# Patient Record
Sex: FEMALE | Race: WHITE | Employment: UNEMPLOYED | URBAN - METROPOLITAN AREA
[De-identification: names, ages, dates, MRNs, and addresses within clinical notes are randomized per-mention and may not be internally consistent; named-entity substitution may affect disease eponyms.]

---

## 2017-05-13 ENCOUNTER — HOSPITAL ENCOUNTER (OUTPATIENT)
Age: 52
Discharge: HOME | End: 2017-05-13
Payer: MEDICAID

## 2017-05-13 LAB
ABSOLUTE EOS #: 0.1 K/UL (ref 0–0.4)
ABSOLUTE LYMPH #: 2.8 K/UL (ref 1–4.8)
ABSOLUTE MONO #: 0.6 K/UL (ref 0.1–1.3)
ALBUMIN SERPL-MCNC: 4 G/DL (ref 3.5–5.2)
ALBUMIN/GLOBULIN RATIO: ABNORMAL (ref 1–2.5)
ALP BLD-CCNC: 44 U/L (ref 35–104)
ALT SERPL-CCNC: 14 U/L (ref 5–33)
ANION GAP SERPL CALCULATED.3IONS-SCNC: 13 MMOL/L (ref 9–17)
AST SERPL-CCNC: 16 U/L
BASOPHILS # BLD: 1 %
BASOPHILS ABSOLUTE: 0.1 K/UL (ref 0–0.2)
BILIRUB SERPL-MCNC: 0.87 MG/DL (ref 0.3–1.2)
BUN BLDV-MCNC: 11 MG/DL (ref 6–20)
BUN/CREAT BLD: ABNORMAL (ref 9–20)
CALCIUM SERPL-MCNC: 9.9 MG/DL (ref 8.6–10.4)
CHLORIDE BLD-SCNC: 94 MMOL/L (ref 98–107)
CHOLESTEROL/HDL RATIO: 4.5
CHOLESTEROL: 263 MG/DL
CO2: 29 MMOL/L (ref 20–31)
CREAT SERPL-MCNC: 0.65 MG/DL (ref 0.5–0.9)
DIFFERENTIAL TYPE: ABNORMAL
EOSINOPHILS RELATIVE PERCENT: 2 %
GFR AFRICAN AMERICAN: >60 ML/MIN
GFR NON-AFRICAN AMERICAN: >60 ML/MIN
GFR SERPL CREATININE-BSD FRML MDRD: ABNORMAL ML/MIN/{1.73_M2}
GFR SERPL CREATININE-BSD FRML MDRD: ABNORMAL ML/MIN/{1.73_M2}
GLUCOSE BLD-MCNC: 104 MG/DL (ref 70–99)
HCT VFR BLD CALC: 46.5 % (ref 36–46)
HDLC SERPL-MCNC: 58 MG/DL
HEMOGLOBIN: 15.4 G/DL (ref 12–16)
LDL CHOLESTEROL: 173 MG/DL (ref 0–130)
LYMPHOCYTES # BLD: 35 %
MCH RBC QN AUTO: 29.2 PG (ref 26–34)
MCHC RBC AUTO-ENTMCNC: 33 G/DL (ref 31–37)
MCV RBC AUTO: 88.5 FL (ref 80–100)
MONOCYTES # BLD: 8 %
PDW BLD-RTO: 13.9 % (ref 11.5–14.9)
PLATELET # BLD: 301 K/UL (ref 150–450)
PLATELET ESTIMATE: ABNORMAL
PMV BLD AUTO: 8.6 FL (ref 6–12)
POTASSIUM SERPL-SCNC: 4.6 MMOL/L (ref 3.7–5.3)
RBC # BLD: 5.26 M/UL (ref 4–5.2)
RBC # BLD: ABNORMAL 10*6/UL
SEG NEUTROPHILS: 54 %
SEGMENTED NEUTROPHILS ABSOLUTE COUNT: 4.5 K/UL (ref 1.3–9.1)
SODIUM BLD-SCNC: 136 MMOL/L (ref 135–144)
TOTAL PROTEIN: 7.6 G/DL (ref 6.4–8.3)
TRIGL SERPL-MCNC: 159 MG/DL
TSH SERPL DL<=0.05 MIU/L-ACNC: 0.94 MIU/L (ref 0.3–5)
VLDLC SERPL CALC-MCNC: ABNORMAL MG/DL (ref 1–30)
WBC # BLD: 8.1 K/UL (ref 3.5–11)
WBC # BLD: ABNORMAL 10*3/UL

## 2017-05-13 PROCEDURE — 36415 COLL VENOUS BLD VENIPUNCTURE: CPT

## 2017-05-13 PROCEDURE — 80061 LIPID PANEL: CPT

## 2017-05-13 PROCEDURE — 84443 ASSAY THYROID STIM HORMONE: CPT

## 2017-05-13 PROCEDURE — 85025 COMPLETE CBC W/AUTO DIFF WBC: CPT

## 2017-05-13 PROCEDURE — 80053 COMPREHEN METABOLIC PANEL: CPT

## 2017-08-18 ENCOUNTER — HOSPITAL ENCOUNTER (OUTPATIENT)
Dept: WOMENS IMAGING | Age: 52
Discharge: HOME | End: 2017-08-18
Payer: MEDICAID

## 2017-08-18 DIAGNOSIS — Z12.31 ENCOUNTER FOR SCREENING MAMMOGRAM FOR BREAST CANCER: ICD-10-CM

## 2017-08-18 PROCEDURE — 77063 BREAST TOMOSYNTHESIS BI: CPT

## 2017-09-11 ENCOUNTER — HOSPITAL ENCOUNTER (OUTPATIENT)
Dept: WOMENS IMAGING | Age: 52
Discharge: HOME | End: 2017-09-11
Payer: MEDICAID

## 2017-09-11 DIAGNOSIS — N64.89 BREAST ASYMMETRY: ICD-10-CM

## 2017-09-11 DIAGNOSIS — R92.8 ABNORMAL MAMMOGRAM: ICD-10-CM

## 2017-09-11 PROCEDURE — 76642 ULTRASOUND BREAST LIMITED: CPT

## 2017-09-11 PROCEDURE — G0279 TOMOSYNTHESIS, MAMMO: HCPCS

## 2018-03-09 ENCOUNTER — HOSPITAL ENCOUNTER (EMERGENCY)
Age: 53
Discharge: HOME | End: 2018-03-09
Attending: EMERGENCY MEDICINE
Payer: MEDICAID

## 2018-03-09 ENCOUNTER — APPOINTMENT (OUTPATIENT)
Dept: CT IMAGING | Age: 53
End: 2018-03-09
Payer: MEDICAID

## 2018-03-09 VITALS
RESPIRATION RATE: 16 BRPM | WEIGHT: 160 LBS | SYSTOLIC BLOOD PRESSURE: 112 MMHG | BODY MASS INDEX: 32.25 KG/M2 | HEIGHT: 59 IN | OXYGEN SATURATION: 98 % | DIASTOLIC BLOOD PRESSURE: 78 MMHG | TEMPERATURE: 98.1 F | HEART RATE: 95 BPM

## 2018-03-09 DIAGNOSIS — T14.8XXA MUSCLE STRAIN: Primary | ICD-10-CM

## 2018-03-09 LAB
-: ABNORMAL
ABSOLUTE EOS #: 0.1 K/UL (ref 0–0.4)
ABSOLUTE IMMATURE GRANULOCYTE: ABNORMAL K/UL (ref 0–0.3)
ABSOLUTE LYMPH #: 3.6 K/UL (ref 1–4.8)
ABSOLUTE MONO #: 0.9 K/UL (ref 0.1–1.3)
ALBUMIN SERPL-MCNC: 4.2 G/DL (ref 3.5–5.2)
ALBUMIN/GLOBULIN RATIO: ABNORMAL (ref 1–2.5)
ALP BLD-CCNC: 38 U/L (ref 35–104)
ALT SERPL-CCNC: 13 U/L (ref 5–33)
AMORPHOUS: ABNORMAL
ANION GAP SERPL CALCULATED.3IONS-SCNC: 11 MMOL/L (ref 9–17)
AST SERPL-CCNC: 15 U/L
BACTERIA: ABNORMAL
BASOPHILS # BLD: 1 % (ref 0–2)
BASOPHILS ABSOLUTE: 0.1 K/UL (ref 0–0.2)
BILIRUB SERPL-MCNC: 0.46 MG/DL (ref 0.3–1.2)
BILIRUBIN URINE: NEGATIVE
BUN BLDV-MCNC: 7 MG/DL (ref 6–20)
BUN/CREAT BLD: ABNORMAL (ref 9–20)
CALCIUM SERPL-MCNC: 9.8 MG/DL (ref 8.6–10.4)
CASTS UA: ABNORMAL /LPF
CHLORIDE BLD-SCNC: 89 MMOL/L (ref 98–107)
CO2: 29 MMOL/L (ref 20–31)
COLOR: YELLOW
COMMENT UA: ABNORMAL
CREAT SERPL-MCNC: 0.61 MG/DL (ref 0.5–0.9)
CRYSTALS, UA: ABNORMAL /HPF
DIFFERENTIAL TYPE: ABNORMAL
EOSINOPHILS RELATIVE PERCENT: 1 % (ref 0–4)
EPITHELIAL CELLS UA: ABNORMAL /HPF
GFR AFRICAN AMERICAN: >60 ML/MIN
GFR NON-AFRICAN AMERICAN: >60 ML/MIN
GFR SERPL CREATININE-BSD FRML MDRD: ABNORMAL ML/MIN/{1.73_M2}
GFR SERPL CREATININE-BSD FRML MDRD: ABNORMAL ML/MIN/{1.73_M2}
GLUCOSE BLD-MCNC: 88 MG/DL (ref 70–99)
GLUCOSE URINE: NEGATIVE
HCT VFR BLD CALC: 46.4 % (ref 36–46)
HEMOGLOBIN: 15.5 G/DL (ref 12–16)
IMMATURE GRANULOCYTES: ABNORMAL %
KETONES, URINE: NEGATIVE
LEUKOCYTE ESTERASE, URINE: NEGATIVE
LIPASE: 94 U/L (ref 13–60)
LYMPHOCYTES # BLD: 34 % (ref 24–44)
MCH RBC QN AUTO: 30.2 PG (ref 26–34)
MCHC RBC AUTO-ENTMCNC: 33.5 G/DL (ref 31–37)
MCV RBC AUTO: 90.2 FL (ref 80–100)
MONOCYTES # BLD: 8 % (ref 1–7)
MUCUS: ABNORMAL
NITRITE, URINE: NEGATIVE
NRBC AUTOMATED: ABNORMAL PER 100 WBC
OTHER OBSERVATIONS UA: ABNORMAL
PDW BLD-RTO: 13.6 % (ref 11.5–14.9)
PH UA: 6.5 (ref 5–8)
PLATELET # BLD: 344 K/UL (ref 150–450)
PLATELET ESTIMATE: ABNORMAL
PMV BLD AUTO: 8.4 FL (ref 6–12)
POTASSIUM SERPL-SCNC: 4.4 MMOL/L (ref 3.7–5.3)
PROTEIN UA: NEGATIVE
RBC # BLD: 5.14 M/UL (ref 4–5.2)
RBC # BLD: ABNORMAL 10*6/UL
RBC UA: ABNORMAL /HPF
RENAL EPITHELIAL, UA: ABNORMAL /HPF
SEG NEUTROPHILS: 56 % (ref 36–66)
SEGMENTED NEUTROPHILS ABSOLUTE COUNT: 5.9 K/UL (ref 1.3–9.1)
SODIUM BLD-SCNC: 129 MMOL/L (ref 135–144)
SPECIFIC GRAVITY UA: 1.01 (ref 1–1.03)
TOTAL PROTEIN: 7.4 G/DL (ref 6.4–8.3)
TRICHOMONAS: ABNORMAL
TURBIDITY: CLEAR
URINE HGB: ABNORMAL
UROBILINOGEN, URINE: NORMAL
WBC # BLD: 10.6 K/UL (ref 3.5–11)
WBC # BLD: ABNORMAL 10*3/UL
WBC UA: ABNORMAL /HPF
YEAST: ABNORMAL

## 2018-03-09 PROCEDURE — 81001 URINALYSIS AUTO W/SCOPE: CPT

## 2018-03-09 PROCEDURE — 83690 ASSAY OF LIPASE: CPT

## 2018-03-09 PROCEDURE — 99284 EMERGENCY DEPT VISIT MOD MDM: CPT

## 2018-03-09 PROCEDURE — 96374 THER/PROPH/DIAG INJ IV PUSH: CPT

## 2018-03-09 PROCEDURE — 74176 CT ABD & PELVIS W/O CONTRAST: CPT

## 2018-03-09 PROCEDURE — 36415 COLL VENOUS BLD VENIPUNCTURE: CPT

## 2018-03-09 PROCEDURE — 80053 COMPREHEN METABOLIC PANEL: CPT

## 2018-03-09 PROCEDURE — 6360000002 HC RX W HCPCS: Performed by: PHYSICIAN ASSISTANT

## 2018-03-09 PROCEDURE — 85025 COMPLETE CBC W/AUTO DIFF WBC: CPT

## 2018-03-09 RX ORDER — KETOROLAC TROMETHAMINE 30 MG/ML
15 INJECTION, SOLUTION INTRAMUSCULAR; INTRAVENOUS ONCE
Status: COMPLETED | OUTPATIENT
Start: 2018-03-09 | End: 2018-03-09

## 2018-03-09 RX ORDER — NAPROXEN 500 MG/1
500 TABLET ORAL 2 TIMES DAILY
Qty: 30 TABLET | Refills: 0 | Status: SHIPPED | OUTPATIENT
Start: 2018-03-09 | End: 2019-10-28

## 2018-03-09 RX ORDER — CYCLOBENZAPRINE HCL 10 MG
10 TABLET ORAL 2 TIMES DAILY PRN
Qty: 15 TABLET | Refills: 0 | Status: SHIPPED | OUTPATIENT
Start: 2018-03-09 | End: 2018-03-19

## 2018-03-09 RX ADMIN — KETOROLAC TROMETHAMINE 15 MG: 30 INJECTION, SOLUTION INTRAMUSCULAR; INTRAVENOUS at 09:44

## 2018-03-09 ASSESSMENT — PAIN DESCRIPTION - ORIENTATION: ORIENTATION: RIGHT

## 2018-03-09 ASSESSMENT — PAIN SCALES - GENERAL
PAINLEVEL_OUTOF10: 8
PAINLEVEL_OUTOF10: 5

## 2018-03-09 ASSESSMENT — PAIN DESCRIPTION - LOCATION: LOCATION: LEG;BACK

## 2018-03-09 NOTE — ED PROVIDER NOTES
[address]  eMERGENCY dEPARTMENT eNCOUnter      Pt Name: Candido Shin  MRN: [id number]  [place] 1965  Date of evaluation: 3/9/2018  Provider: PAUL Hennessy    CHIEF COMPLAINT       Chief Complaint   Patient presents with    Abdominal Pain           HISTORY OF PRESENT ILLNESS  (Location/Symptom, Timing/Onset, Context/Setting, Quality, Duration, Modifying Factors, Severity.)   Candido Shin is a 47 y.o. female who presents to the emergency department With her daughter with the complaints of right-sided flank pain. She states that this started about 3 days ago. She denies any injuries or falls. She denies any numbness or tingling, states that it slightly on her right hip and slightly above it. She denies any nausea or vomiting, denies any fevers or chills. Denies any dysuria or hematuria. She denies any chest pain shortness of breath abdominal pain nausea or vomiting. Denies any history of kidney stones. She states that it's aggravated by movement and sitting too long and she has to change positions. Nursing Notes were reviewed. REVIEW OF SYSTEMS    (2-9 systems for level 4, 10 or more for level 5)     Review of Systems   Right-sided flank/hip pain    Except as noted above the remainder of the review of systems was reviewed and negative. PAST MEDICAL HISTORY     Past Medical History:   Diagnosis Date    COPD (chronic obstructive pulmonary disease) ([facility] [address]     Hypertension      None otherwise stated in nurses notes    SURGICAL HISTORY       Past Surgical History:   Procedure Laterality Date     SECTION       None otherwise stated in nurses notes    CURRENT MEDICATIONS       Previous Medications    ALBUTEROL (PROVENTIL) (2.5 MG/3ML) 0.083% NEBULIZER SOLUTION    Take 2.5 mg by nebulization every 6 hours as needed for Wheezing. CLONIDINE (CATAPRES) 0.3 MG TABLET    Take 1 tablet by mouth 3 times daily.     DILTIAZEM (CARDIZEM) 60 MG TABLET    Take 1 tablet by mouth 4 times

## 2019-05-15 ENCOUNTER — HOSPITAL ENCOUNTER (OUTPATIENT)
Age: 54
Discharge: HOME | End: 2019-05-15
Payer: COMMERCIAL

## 2019-05-15 LAB
ALBUMIN SERPL-MCNC: 4.3 G/DL (ref 3.5–5.2)
ALBUMIN/GLOBULIN RATIO: ABNORMAL (ref 1–2.5)
ALP BLD-CCNC: 35 U/L (ref 35–104)
ALT SERPL-CCNC: 11 U/L (ref 5–33)
ANION GAP SERPL CALCULATED.3IONS-SCNC: 12 MMOL/L (ref 9–17)
AST SERPL-CCNC: 13 U/L
BILIRUB SERPL-MCNC: 0.35 MG/DL (ref 0.3–1.2)
BUN BLDV-MCNC: 6 MG/DL (ref 6–20)
BUN/CREAT BLD: ABNORMAL (ref 9–20)
CALCIUM SERPL-MCNC: 10 MG/DL (ref 8.6–10.4)
CHLORIDE BLD-SCNC: 91 MMOL/L (ref 98–107)
CO2: 29 MMOL/L (ref 20–31)
CREAT SERPL-MCNC: 0.65 MG/DL (ref 0.5–0.9)
GFR AFRICAN AMERICAN: >60 ML/MIN
GFR NON-AFRICAN AMERICAN: >60 ML/MIN
GFR SERPL CREATININE-BSD FRML MDRD: ABNORMAL ML/MIN/{1.73_M2}
GFR SERPL CREATININE-BSD FRML MDRD: ABNORMAL ML/MIN/{1.73_M2}
GLUCOSE BLD-MCNC: 97 MG/DL (ref 70–99)
POTASSIUM SERPL-SCNC: 4.1 MMOL/L (ref 3.7–5.3)
SODIUM BLD-SCNC: 132 MMOL/L (ref 135–144)
TOTAL PROTEIN: 7.6 G/DL (ref 6.4–8.3)

## 2019-05-15 PROCEDURE — 80053 COMPREHEN METABOLIC PANEL: CPT

## 2019-05-15 PROCEDURE — 36415 COLL VENOUS BLD VENIPUNCTURE: CPT

## 2019-06-05 ENCOUNTER — HOSPITAL ENCOUNTER (OUTPATIENT)
Age: 54
Setting detail: SPECIMEN
Discharge: HOME | End: 2019-06-05
Payer: COMMERCIAL

## 2019-06-07 LAB
HPV SAMPLE: NORMAL
HPV, GENOTYPE 16: NOT DETECTED
HPV, GENOTYPE 18: NOT DETECTED
HPV, HIGH RISK OTHER: NOT DETECTED
HPV, INTERPRETATION: NORMAL
SPECIMEN DESCRIPTION: NORMAL

## 2019-06-10 LAB — CYTOLOGY REPORT: NORMAL

## 2019-10-28 ENCOUNTER — APPOINTMENT (OUTPATIENT)
Dept: CT IMAGING | Age: 54
DRG: 139 | End: 2019-10-28
Payer: COMMERCIAL

## 2019-10-28 ENCOUNTER — APPOINTMENT (OUTPATIENT)
Dept: GENERAL RADIOLOGY | Age: 54
DRG: 139 | End: 2019-10-28
Payer: COMMERCIAL

## 2019-10-28 ENCOUNTER — HOSPITAL ENCOUNTER (INPATIENT)
Age: 54
LOS: 4 days | Discharge: HOME HEALTH | DRG: 139 | End: 2019-11-01
Attending: EMERGENCY MEDICINE | Admitting: INTERNAL MEDICINE
Payer: COMMERCIAL

## 2019-10-28 DIAGNOSIS — R09.02 HYPOXIA: ICD-10-CM

## 2019-10-28 DIAGNOSIS — J44.1 COPD EXACERBATION (HCC): Primary | ICD-10-CM

## 2019-10-28 DIAGNOSIS — R09.02 CHRONIC OBSTRUCTIVE PULMONARY DISEASE WITH HYPOXIA (HCC): ICD-10-CM

## 2019-10-28 DIAGNOSIS — J18.9 PNEUMONIA DUE TO ORGANISM: ICD-10-CM

## 2019-10-28 DIAGNOSIS — J44.9 CHRONIC OBSTRUCTIVE PULMONARY DISEASE WITH HYPOXIA (HCC): ICD-10-CM

## 2019-10-28 LAB
ABSOLUTE BANDS #: 0.86 K/UL (ref 0–1)
ABSOLUTE EOS #: 0 K/UL (ref 0–0.4)
ABSOLUTE IMMATURE GRANULOCYTE: ABNORMAL K/UL (ref 0–0.3)
ABSOLUTE LYMPH #: 2.8 K/UL (ref 1–4.8)
ABSOLUTE MONO #: 1.51 K/UL (ref 0.1–1.3)
ALBUMIN SERPL-MCNC: 3.7 G/DL (ref 3.5–5.2)
ALBUMIN/GLOBULIN RATIO: ABNORMAL (ref 1–2.5)
ALLEN TEST: ABNORMAL
ALP BLD-CCNC: 77 U/L (ref 35–104)
ALT SERPL-CCNC: 16 U/L (ref 5–33)
ANION GAP SERPL CALCULATED.3IONS-SCNC: 17 MMOL/L (ref 9–17)
AST SERPL-CCNC: 15 U/L
BANDS: 4 % (ref 0–10)
BASOPHILS # BLD: 0 % (ref 0–2)
BASOPHILS ABSOLUTE: 0 K/UL (ref 0–0.2)
BILIRUB SERPL-MCNC: 0.49 MG/DL (ref 0.3–1.2)
BUN BLDV-MCNC: 8 MG/DL (ref 6–20)
BUN/CREAT BLD: ABNORMAL (ref 9–20)
CALCIUM SERPL-MCNC: 9.7 MG/DL (ref 8.6–10.4)
CARBOXYHEMOGLOBIN: 2.2 % (ref 0–5)
CHLORIDE BLD-SCNC: 77 MMOL/L (ref 98–107)
CO2: 32 MMOL/L (ref 20–31)
CREAT SERPL-MCNC: 0.55 MG/DL (ref 0.5–0.9)
DIFFERENTIAL TYPE: ABNORMAL
DIRECT EXAM: ABNORMAL
DIRECT EXAM: NORMAL
DIRECT EXAM: NORMAL
EOSINOPHILS RELATIVE PERCENT: 0 % (ref 0–4)
FIO2: 36
GFR AFRICAN AMERICAN: >60 ML/MIN
GFR NON-AFRICAN AMERICAN: >60 ML/MIN
GFR SERPL CREATININE-BSD FRML MDRD: ABNORMAL ML/MIN/{1.73_M2}
GFR SERPL CREATININE-BSD FRML MDRD: ABNORMAL ML/MIN/{1.73_M2}
GLUCOSE BLD-MCNC: 128 MG/DL (ref 70–99)
HCO3 ARTERIAL: 35.4 MMOL/L (ref 22–26)
HCT VFR BLD CALC: 41.4 % (ref 36–46)
HEMOGLOBIN: 13.8 G/DL (ref 12–16)
IMMATURE GRANULOCYTES: ABNORMAL %
LACTIC ACID, SEPSIS WHOLE BLOOD: NORMAL MMOL/L (ref 0.5–1.9)
LACTIC ACID, SEPSIS: 1.2 MMOL/L (ref 0.5–1.9)
LYMPHOCYTES # BLD: 13 % (ref 24–44)
Lab: ABNORMAL
Lab: NORMAL
Lab: NORMAL
MCH RBC QN AUTO: 29.9 PG (ref 26–34)
MCHC RBC AUTO-ENTMCNC: 33.3 G/DL (ref 31–37)
MCV RBC AUTO: 89.8 FL (ref 80–100)
METHEMOGLOBIN: 0.4 % (ref 0–1.9)
MODE: ABNORMAL
MONOCYTES # BLD: 7 % (ref 1–7)
MORPHOLOGY: NORMAL
NEGATIVE BASE EXCESS, ART: ABNORMAL MMOL/L (ref 0–2)
NOTIFICATION TIME: ABNORMAL
NOTIFICATION: ABNORMAL
NRBC AUTOMATED: ABNORMAL PER 100 WBC
O2 DEVICE/FLOW/%: ABNORMAL
O2 SAT, ARTERIAL: 90 % (ref 95–98)
OXYHEMOGLOBIN: ABNORMAL % (ref 95–98)
PATIENT TEMP: 37
PCO2 ARTERIAL: 46.9 MMHG (ref 35–45)
PCO2, ART, TEMP ADJ: ABNORMAL (ref 35–45)
PDW BLD-RTO: 14 % (ref 11.5–14.9)
PEEP/CPAP: ABNORMAL
PH ARTERIAL: 7.49 (ref 7.35–7.45)
PH, ART, TEMP ADJ: ABNORMAL (ref 7.35–7.45)
PLATELET # BLD: 478 K/UL (ref 150–450)
PLATELET ESTIMATE: ABNORMAL
PMV BLD AUTO: 7.8 FL (ref 6–12)
PO2 ARTERIAL: 59.8 MMHG (ref 80–100)
PO2, ART, TEMP ADJ: ABNORMAL MMHG (ref 80–100)
POSITIVE BASE EXCESS, ART: 12.1 MMOL/L (ref 0–2)
POTASSIUM SERPL-SCNC: 3 MMOL/L (ref 3.7–5.3)
PROCALCITONIN: 4.53 NG/ML
PSV: ABNORMAL
PT. POSITION: ABNORMAL
RBC # BLD: 4.61 M/UL (ref 4–5.2)
RBC # BLD: ABNORMAL 10*6/UL
RESPIRATORY RATE: 18
SAMPLE SITE: ABNORMAL
SEG NEUTROPHILS: 76 % (ref 36–66)
SEGMENTED NEUTROPHILS ABSOLUTE COUNT: 16.33 K/UL (ref 1.3–9.1)
SET RATE: ABNORMAL
SODIUM BLD-SCNC: 126 MMOL/L (ref 135–144)
SPECIMEN DESCRIPTION: ABNORMAL
SPECIMEN DESCRIPTION: NORMAL
SPECIMEN DESCRIPTION: NORMAL
TEXT FOR RESPIRATORY: ABNORMAL
TOTAL HB: ABNORMAL G/DL (ref 12–16)
TOTAL PROTEIN: 7.9 G/DL (ref 6.4–8.3)
TOTAL RATE: ABNORMAL
TROPONIN INTERP: NORMAL
TROPONIN INTERP: NORMAL
TROPONIN T: NORMAL NG/ML
TROPONIN T: NORMAL NG/ML
TROPONIN, HIGH SENSITIVITY: 10 NG/L (ref 0–14)
TROPONIN, HIGH SENSITIVITY: 11 NG/L (ref 0–14)
VT: ABNORMAL
WBC # BLD: 21.5 K/UL (ref 3.5–11)
WBC # BLD: ABNORMAL 10*3/UL

## 2019-10-28 PROCEDURE — 94640 AIRWAY INHALATION TREATMENT: CPT

## 2019-10-28 PROCEDURE — 6370000000 HC RX 637 (ALT 250 FOR IP): Performed by: STUDENT IN AN ORGANIZED HEALTH CARE EDUCATION/TRAINING PROGRAM

## 2019-10-28 PROCEDURE — 87804 INFLUENZA ASSAY W/OPTIC: CPT

## 2019-10-28 PROCEDURE — 6370000000 HC RX 637 (ALT 250 FOR IP): Performed by: EMERGENCY MEDICINE

## 2019-10-28 PROCEDURE — 87449 NOS EACH ORGANISM AG IA: CPT

## 2019-10-28 PROCEDURE — 83605 ASSAY OF LACTIC ACID: CPT

## 2019-10-28 PROCEDURE — 6360000004 HC RX CONTRAST MEDICATION: Performed by: EMERGENCY MEDICINE

## 2019-10-28 PROCEDURE — 87070 CULTURE OTHR SPECIMN AEROBIC: CPT

## 2019-10-28 PROCEDURE — 71045 X-RAY EXAM CHEST 1 VIEW: CPT

## 2019-10-28 PROCEDURE — 85025 COMPLETE CBC W/AUTO DIFF WBC: CPT

## 2019-10-28 PROCEDURE — 99285 EMERGENCY DEPT VISIT HI MDM: CPT

## 2019-10-28 PROCEDURE — 2580000003 HC RX 258: Performed by: EMERGENCY MEDICINE

## 2019-10-28 PROCEDURE — 82805 BLOOD GASES W/O2 SATURATION: CPT

## 2019-10-28 PROCEDURE — 80053 COMPREHEN METABOLIC PANEL: CPT

## 2019-10-28 PROCEDURE — 87205 SMEAR GRAM STAIN: CPT

## 2019-10-28 PROCEDURE — 96374 THER/PROPH/DIAG INJ IV PUSH: CPT

## 2019-10-28 PROCEDURE — 36415 COLL VENOUS BLD VENIPUNCTURE: CPT

## 2019-10-28 PROCEDURE — 6360000002 HC RX W HCPCS: Performed by: STUDENT IN AN ORGANIZED HEALTH CARE EDUCATION/TRAINING PROGRAM

## 2019-10-28 PROCEDURE — 87899 AGENT NOS ASSAY W/OPTIC: CPT

## 2019-10-28 PROCEDURE — 84484 ASSAY OF TROPONIN QUANT: CPT

## 2019-10-28 PROCEDURE — 87040 BLOOD CULTURE FOR BACTERIA: CPT

## 2019-10-28 PROCEDURE — 84145 PROCALCITONIN (PCT): CPT

## 2019-10-28 PROCEDURE — 99223 1ST HOSP IP/OBS HIGH 75: CPT | Performed by: INTERNAL MEDICINE

## 2019-10-28 PROCEDURE — 6360000002 HC RX W HCPCS: Performed by: EMERGENCY MEDICINE

## 2019-10-28 PROCEDURE — 36600 WITHDRAWAL OF ARTERIAL BLOOD: CPT

## 2019-10-28 PROCEDURE — 94761 N-INVAS EAR/PLS OXIMETRY MLT: CPT

## 2019-10-28 PROCEDURE — 2060000000 HC ICU INTERMEDIATE R&B

## 2019-10-28 PROCEDURE — 2580000003 HC RX 258: Performed by: STUDENT IN AN ORGANIZED HEALTH CARE EDUCATION/TRAINING PROGRAM

## 2019-10-28 PROCEDURE — 6370000000 HC RX 637 (ALT 250 FOR IP): Performed by: INTERNAL MEDICINE

## 2019-10-28 PROCEDURE — 93005 ELECTROCARDIOGRAM TRACING: CPT | Performed by: EMERGENCY MEDICINE

## 2019-10-28 PROCEDURE — 71260 CT THORAX DX C+: CPT

## 2019-10-28 RX ORDER — MULTIVITAMIN WITH FOLIC ACID 400 MCG
1 TABLET ORAL DAILY
COMMUNITY

## 2019-10-28 RX ORDER — FAMOTIDINE 20 MG/1
20 TABLET, FILM COATED ORAL 2 TIMES DAILY
Status: DISCONTINUED | OUTPATIENT
Start: 2019-10-28 | End: 2019-11-01 | Stop reason: HOSPADM

## 2019-10-28 RX ORDER — ALBUTEROL SULFATE 2.5 MG/3ML
2.5 SOLUTION RESPIRATORY (INHALATION) EVERY 6 HOURS PRN
Status: DISCONTINUED | OUTPATIENT
Start: 2019-10-28 | End: 2019-11-01 | Stop reason: HOSPADM

## 2019-10-28 RX ORDER — METHYLPREDNISOLONE SODIUM SUCCINATE 40 MG/ML
40 INJECTION, POWDER, LYOPHILIZED, FOR SOLUTION INTRAMUSCULAR; INTRAVENOUS EVERY 6 HOURS
Status: COMPLETED | OUTPATIENT
Start: 2019-10-28 | End: 2019-10-30

## 2019-10-28 RX ORDER — LEVOFLOXACIN 5 MG/ML
750 INJECTION, SOLUTION INTRAVENOUS EVERY 24 HOURS
Status: DISCONTINUED | OUTPATIENT
Start: 2019-10-28 | End: 2019-11-01 | Stop reason: HOSPADM

## 2019-10-28 RX ORDER — LORATADINE 10 MG/1
10 TABLET ORAL DAILY
COMMUNITY

## 2019-10-28 RX ORDER — SODIUM CHLORIDE 0.9 % (FLUSH) 0.9 %
10 SYRINGE (ML) INJECTION EVERY 12 HOURS SCHEDULED
Status: DISCONTINUED | OUTPATIENT
Start: 2019-10-28 | End: 2019-11-01 | Stop reason: HOSPADM

## 2019-10-28 RX ORDER — ALBUTEROL SULFATE 90 UG/1
2 AEROSOL, METERED RESPIRATORY (INHALATION) EVERY 6 HOURS PRN
COMMUNITY

## 2019-10-28 RX ORDER — 0.9 % SODIUM CHLORIDE 0.9 %
1000 INTRAVENOUS SOLUTION INTRAVENOUS ONCE
Status: COMPLETED | OUTPATIENT
Start: 2019-10-28 | End: 2019-10-28

## 2019-10-28 RX ORDER — LISINOPRIL 20 MG/1
20 TABLET ORAL 2 TIMES DAILY
Status: DISCONTINUED | OUTPATIENT
Start: 2019-10-28 | End: 2019-11-01 | Stop reason: HOSPADM

## 2019-10-28 RX ORDER — SODIUM CHLORIDE 0.9 % (FLUSH) 0.9 %
10 SYRINGE (ML) INJECTION PRN
Status: DISCONTINUED | OUTPATIENT
Start: 2019-10-28 | End: 2019-11-01 | Stop reason: HOSPADM

## 2019-10-28 RX ORDER — 0.9 % SODIUM CHLORIDE 0.9 %
80 INTRAVENOUS SOLUTION INTRAVENOUS ONCE
Status: COMPLETED | OUTPATIENT
Start: 2019-10-28 | End: 2019-10-28

## 2019-10-28 RX ORDER — POTASSIUM CHLORIDE 7.45 MG/ML
10 INJECTION INTRAVENOUS PRN
Status: DISCONTINUED | OUTPATIENT
Start: 2019-10-28 | End: 2019-11-01 | Stop reason: HOSPADM

## 2019-10-28 RX ORDER — ALBUTEROL SULFATE 2.5 MG/3ML
2.5 SOLUTION RESPIRATORY (INHALATION)
Status: DISCONTINUED | OUTPATIENT
Start: 2019-10-28 | End: 2019-10-29 | Stop reason: ALTCHOICE

## 2019-10-28 RX ORDER — ONDANSETRON 2 MG/ML
4 INJECTION INTRAMUSCULAR; INTRAVENOUS EVERY 6 HOURS PRN
Status: DISCONTINUED | OUTPATIENT
Start: 2019-10-28 | End: 2019-11-01 | Stop reason: HOSPADM

## 2019-10-28 RX ORDER — SIMVASTATIN 20 MG
20 TABLET ORAL NIGHTLY
Status: ON HOLD | COMMUNITY
End: 2019-11-01 | Stop reason: HOSPADM

## 2019-10-28 RX ORDER — MAGNESIUM SULFATE 1 G/100ML
1 INJECTION INTRAVENOUS PRN
Status: DISCONTINUED | OUTPATIENT
Start: 2019-10-28 | End: 2019-11-01 | Stop reason: HOSPADM

## 2019-10-28 RX ORDER — ACETAMINOPHEN 325 MG/1
650 TABLET ORAL EVERY 4 HOURS PRN
Status: DISCONTINUED | OUTPATIENT
Start: 2019-10-28 | End: 2019-11-01 | Stop reason: HOSPADM

## 2019-10-28 RX ORDER — NICOTINE 21 MG/24HR
1 PATCH, TRANSDERMAL 24 HOURS TRANSDERMAL DAILY PRN
Status: DISCONTINUED | OUTPATIENT
Start: 2019-10-28 | End: 2019-11-01 | Stop reason: HOSPADM

## 2019-10-28 RX ORDER — LISINOPRIL AND HYDROCHLOROTHIAZIDE 20; 12.5 MG/1; MG/1
1 TABLET ORAL DAILY
Status: ON HOLD | COMMUNITY
End: 2019-11-01 | Stop reason: HOSPADM

## 2019-10-28 RX ORDER — METHYLPREDNISOLONE SODIUM SUCCINATE 125 MG/2ML
125 INJECTION, POWDER, LYOPHILIZED, FOR SOLUTION INTRAMUSCULAR; INTRAVENOUS ONCE
Status: COMPLETED | OUTPATIENT
Start: 2019-10-28 | End: 2019-10-28

## 2019-10-28 RX ORDER — IPRATROPIUM BROMIDE AND ALBUTEROL SULFATE 2.5; .5 MG/3ML; MG/3ML
1 SOLUTION RESPIRATORY (INHALATION)
Status: DISCONTINUED | OUTPATIENT
Start: 2019-10-28 | End: 2019-11-01 | Stop reason: HOSPADM

## 2019-10-28 RX ORDER — MONTELUKAST SODIUM 10 MG/1
10 TABLET ORAL NIGHTLY
Status: DISCONTINUED | OUTPATIENT
Start: 2019-10-28 | End: 2019-11-01 | Stop reason: HOSPADM

## 2019-10-28 RX ORDER — SODIUM CHLORIDE 9 MG/ML
INJECTION, SOLUTION INTRAVENOUS CONTINUOUS
Status: DISCONTINUED | OUTPATIENT
Start: 2019-10-28 | End: 2019-10-30

## 2019-10-28 RX ORDER — AZITHROMYCIN 250 MG/1
500 TABLET, FILM COATED ORAL ONCE
Status: COMPLETED | OUTPATIENT
Start: 2019-10-28 | End: 2019-10-28

## 2019-10-28 RX ORDER — IPRATROPIUM BROMIDE AND ALBUTEROL SULFATE 2.5; .5 MG/3ML; MG/3ML
1 SOLUTION RESPIRATORY (INHALATION)
Status: DISCONTINUED | OUTPATIENT
Start: 2019-10-28 | End: 2019-10-29

## 2019-10-28 RX ORDER — POTASSIUM CHLORIDE 20 MEQ/1
40 TABLET, EXTENDED RELEASE ORAL PRN
Status: DISCONTINUED | OUTPATIENT
Start: 2019-10-28 | End: 2019-11-01 | Stop reason: HOSPADM

## 2019-10-28 RX ORDER — PREDNISONE 20 MG/1
40 TABLET ORAL DAILY
Status: DISCONTINUED | OUTPATIENT
Start: 2019-10-31 | End: 2019-11-01 | Stop reason: HOSPADM

## 2019-10-28 RX ADMIN — IPRATROPIUM BROMIDE AND ALBUTEROL SULFATE 1 AMPULE: .5; 3 SOLUTION RESPIRATORY (INHALATION) at 19:54

## 2019-10-28 RX ADMIN — MONTELUKAST 10 MG: 10 TABLET, FILM COATED ORAL at 20:20

## 2019-10-28 RX ADMIN — SODIUM CHLORIDE: 9 INJECTION, SOLUTION INTRAVENOUS at 16:29

## 2019-10-28 RX ADMIN — METHYLPREDNISOLONE SODIUM SUCCINATE 40 MG: 40 INJECTION, POWDER, LYOPHILIZED, FOR SOLUTION INTRAMUSCULAR; INTRAVENOUS at 20:20

## 2019-10-28 RX ADMIN — CEFTRIAXONE SODIUM 1 G: 1 INJECTION, POWDER, FOR SOLUTION INTRAMUSCULAR; INTRAVENOUS at 15:23

## 2019-10-28 RX ADMIN — SODIUM CHLORIDE 1000 ML: 9 INJECTION, SOLUTION INTRAVENOUS at 13:17

## 2019-10-28 RX ADMIN — IOPAMIDOL 100 ML: 755 INJECTION, SOLUTION INTRAVENOUS at 14:10

## 2019-10-28 RX ADMIN — Medication 10 ML: at 14:10

## 2019-10-28 RX ADMIN — SODIUM CHLORIDE 80 ML: 0.9 INJECTION, SOLUTION INTRAVENOUS at 17:49

## 2019-10-28 RX ADMIN — LISINOPRIL 20 MG: 20 TABLET ORAL at 20:20

## 2019-10-28 RX ADMIN — LEVOFLOXACIN 750 MG: 5 INJECTION, SOLUTION INTRAVENOUS at 17:47

## 2019-10-28 RX ADMIN — Medication 2 PUFF: at 20:21

## 2019-10-28 RX ADMIN — AZITHROMYCIN 500 MG: 250 TABLET, FILM COATED ORAL at 15:23

## 2019-10-28 RX ADMIN — METHYLPREDNISOLONE SODIUM SUCCINATE 125 MG: 125 INJECTION, POWDER, FOR SOLUTION INTRAMUSCULAR; INTRAVENOUS at 13:22

## 2019-10-28 RX ADMIN — IPRATROPIUM BROMIDE AND ALBUTEROL SULFATE 1 AMPULE: .5; 3 SOLUTION RESPIRATORY (INHALATION) at 12:40

## 2019-10-28 RX ADMIN — FAMOTIDINE 20 MG: 20 TABLET, FILM COATED ORAL at 20:20

## 2019-10-28 RX ADMIN — IPRATROPIUM BROMIDE AND ALBUTEROL SULFATE 1 AMPULE: .5; 3 SOLUTION RESPIRATORY (INHALATION) at 12:50

## 2019-10-28 ASSESSMENT — ENCOUNTER SYMPTOMS
NAUSEA: 1
SPUTUM PRODUCTION: 1
ABDOMINAL DISTENTION: 0
SHORTNESS OF BREATH: 1
DIARRHEA: 0
VOMITING: 0
COUGH: 1
ABDOMINAL PAIN: 1
WHEEZING: 1
CHEST TIGHTNESS: 1

## 2019-10-28 ASSESSMENT — PAIN SCALES - GENERAL: PAINLEVEL_OUTOF10: 0

## 2019-10-29 ENCOUNTER — APPOINTMENT (OUTPATIENT)
Dept: GENERAL RADIOLOGY | Age: 54
DRG: 139 | End: 2019-10-29
Payer: COMMERCIAL

## 2019-10-29 LAB
ABSOLUTE BANDS #: 0.51 K/UL (ref 0–1)
ABSOLUTE EOS #: 0 K/UL (ref 0–0.4)
ABSOLUTE IMMATURE GRANULOCYTE: ABNORMAL K/UL (ref 0–0.3)
ABSOLUTE LYMPH #: 1.87 K/UL (ref 1–4.8)
ABSOLUTE MONO #: 0.51 K/UL (ref 0.1–1.3)
ALBUMIN SERPL-MCNC: 3.5 G/DL (ref 3.5–5.2)
ALBUMIN/GLOBULIN RATIO: ABNORMAL (ref 1–2.5)
ALP BLD-CCNC: 68 U/L (ref 35–104)
ALT SERPL-CCNC: 13 U/L (ref 5–33)
ANION GAP SERPL CALCULATED.3IONS-SCNC: 15 MMOL/L (ref 9–17)
AST SERPL-CCNC: 12 U/L
BANDS: 3 % (ref 0–10)
BASOPHILS # BLD: 0 % (ref 0–2)
BASOPHILS ABSOLUTE: 0 K/UL (ref 0–0.2)
BILIRUB SERPL-MCNC: 0.33 MG/DL (ref 0.3–1.2)
BUN BLDV-MCNC: 5 MG/DL (ref 6–20)
BUN/CREAT BLD: ABNORMAL (ref 9–20)
CALCIUM SERPL-MCNC: 9.1 MG/DL (ref 8.6–10.4)
CHLORIDE BLD-SCNC: 84 MMOL/L (ref 98–107)
CO2: 29 MMOL/L (ref 20–31)
CREAT SERPL-MCNC: 0.47 MG/DL (ref 0.5–0.9)
DIFFERENTIAL TYPE: ABNORMAL
EOSINOPHILS RELATIVE PERCENT: 0 % (ref 0–4)
GFR AFRICAN AMERICAN: >60 ML/MIN
GFR NON-AFRICAN AMERICAN: >60 ML/MIN
GFR SERPL CREATININE-BSD FRML MDRD: ABNORMAL ML/MIN/{1.73_M2}
GFR SERPL CREATININE-BSD FRML MDRD: ABNORMAL ML/MIN/{1.73_M2}
GLUCOSE BLD-MCNC: 170 MG/DL (ref 70–99)
HCT VFR BLD CALC: 38 % (ref 36–46)
HEMOGLOBIN: 12.7 G/DL (ref 12–16)
IMMATURE GRANULOCYTES: ABNORMAL %
LYMPHOCYTES # BLD: 11 % (ref 24–44)
MAGNESIUM: 2.5 MG/DL (ref 1.6–2.6)
MCH RBC QN AUTO: 30.3 PG (ref 26–34)
MCHC RBC AUTO-ENTMCNC: 33.5 G/DL (ref 31–37)
MCV RBC AUTO: 90.4 FL (ref 80–100)
METAMYELOCYTES ABSOLUTE COUNT: 0.17 K/UL
METAMYELOCYTES: 1 %
MONOCYTES # BLD: 3 % (ref 1–7)
MORPHOLOGY: NORMAL
NRBC AUTOMATED: ABNORMAL PER 100 WBC
PDW BLD-RTO: 13.9 % (ref 11.5–14.9)
PLATELET # BLD: 475 K/UL (ref 150–450)
PLATELET ESTIMATE: ABNORMAL
PMV BLD AUTO: 8 FL (ref 6–12)
POTASSIUM SERPL-SCNC: 2.9 MMOL/L (ref 3.7–5.3)
POTASSIUM SERPL-SCNC: 3.7 MMOL/L (ref 3.7–5.3)
RBC # BLD: 4.2 M/UL (ref 4–5.2)
RBC # BLD: ABNORMAL 10*6/UL
SEG NEUTROPHILS: 82 % (ref 36–66)
SEGMENTED NEUTROPHILS ABSOLUTE COUNT: 13.94 K/UL (ref 1.3–9.1)
SODIUM BLD-SCNC: 128 MMOL/L (ref 135–144)
TOTAL PROTEIN: 7.2 G/DL (ref 6.4–8.3)
WBC # BLD: 17 K/UL (ref 3.5–11)
WBC # BLD: ABNORMAL 10*3/UL

## 2019-10-29 PROCEDURE — 71045 X-RAY EXAM CHEST 1 VIEW: CPT

## 2019-10-29 PROCEDURE — 99254 IP/OBS CNSLTJ NEW/EST MOD 60: CPT | Performed by: INTERNAL MEDICINE

## 2019-10-29 PROCEDURE — 2580000003 HC RX 258: Performed by: STUDENT IN AN ORGANIZED HEALTH CARE EDUCATION/TRAINING PROGRAM

## 2019-10-29 PROCEDURE — 6360000002 HC RX W HCPCS: Performed by: STUDENT IN AN ORGANIZED HEALTH CARE EDUCATION/TRAINING PROGRAM

## 2019-10-29 PROCEDURE — 6370000000 HC RX 637 (ALT 250 FOR IP): Performed by: STUDENT IN AN ORGANIZED HEALTH CARE EDUCATION/TRAINING PROGRAM

## 2019-10-29 PROCEDURE — 97166 OT EVAL MOD COMPLEX 45 MIN: CPT

## 2019-10-29 PROCEDURE — 97162 PT EVAL MOD COMPLEX 30 MIN: CPT

## 2019-10-29 PROCEDURE — 83735 ASSAY OF MAGNESIUM: CPT

## 2019-10-29 PROCEDURE — 94640 AIRWAY INHALATION TREATMENT: CPT

## 2019-10-29 PROCEDURE — 6370000000 HC RX 637 (ALT 250 FOR IP): Performed by: INTERNAL MEDICINE

## 2019-10-29 PROCEDURE — 85025 COMPLETE CBC W/AUTO DIFF WBC: CPT

## 2019-10-29 PROCEDURE — 2700000000 HC OXYGEN THERAPY PER DAY

## 2019-10-29 PROCEDURE — 94761 N-INVAS EAR/PLS OXIMETRY MLT: CPT

## 2019-10-29 PROCEDURE — 36415 COLL VENOUS BLD VENIPUNCTURE: CPT

## 2019-10-29 PROCEDURE — 2060000000 HC ICU INTERMEDIATE R&B

## 2019-10-29 PROCEDURE — 84132 ASSAY OF SERUM POTASSIUM: CPT

## 2019-10-29 PROCEDURE — 80053 COMPREHEN METABOLIC PANEL: CPT

## 2019-10-29 RX ADMIN — IPRATROPIUM BROMIDE AND ALBUTEROL SULFATE 1 AMPULE: .5; 3 SOLUTION RESPIRATORY (INHALATION) at 07:29

## 2019-10-29 RX ADMIN — POTASSIUM CHLORIDE 10 MEQ: 7.46 INJECTION, SOLUTION INTRAVENOUS at 13:10

## 2019-10-29 RX ADMIN — POTASSIUM CHLORIDE 10 MEQ: 7.46 INJECTION, SOLUTION INTRAVENOUS at 07:59

## 2019-10-29 RX ADMIN — Medication 10 ML: at 10:05

## 2019-10-29 RX ADMIN — IPRATROPIUM BROMIDE AND ALBUTEROL SULFATE 1 AMPULE: .5; 3 SOLUTION RESPIRATORY (INHALATION) at 19:34

## 2019-10-29 RX ADMIN — METHYLPREDNISOLONE SODIUM SUCCINATE 40 MG: 40 INJECTION, POWDER, LYOPHILIZED, FOR SOLUTION INTRAMUSCULAR; INTRAVENOUS at 18:25

## 2019-10-29 RX ADMIN — IPRATROPIUM BROMIDE AND ALBUTEROL SULFATE 1 AMPULE: .5; 3 SOLUTION RESPIRATORY (INHALATION) at 15:29

## 2019-10-29 RX ADMIN — FAMOTIDINE 20 MG: 20 TABLET, FILM COATED ORAL at 08:19

## 2019-10-29 RX ADMIN — SODIUM CHLORIDE: 9 INJECTION, SOLUTION INTRAVENOUS at 17:01

## 2019-10-29 RX ADMIN — FAMOTIDINE 20 MG: 20 TABLET, FILM COATED ORAL at 20:16

## 2019-10-29 RX ADMIN — LISINOPRIL 20 MG: 20 TABLET ORAL at 08:19

## 2019-10-29 RX ADMIN — POTASSIUM CHLORIDE 10 MEQ: 7.46 INJECTION, SOLUTION INTRAVENOUS at 10:10

## 2019-10-29 RX ADMIN — POTASSIUM CHLORIDE 10 MEQ: 7.46 INJECTION, SOLUTION INTRAVENOUS at 14:30

## 2019-10-29 RX ADMIN — METHYLPREDNISOLONE SODIUM SUCCINATE 40 MG: 40 INJECTION, POWDER, LYOPHILIZED, FOR SOLUTION INTRAMUSCULAR; INTRAVENOUS at 06:07

## 2019-10-29 RX ADMIN — Medication 2 PUFF: at 20:16

## 2019-10-29 RX ADMIN — Medication 2 PUFF: at 10:02

## 2019-10-29 RX ADMIN — METHYLPREDNISOLONE SODIUM SUCCINATE 40 MG: 40 INJECTION, POWDER, LYOPHILIZED, FOR SOLUTION INTRAMUSCULAR; INTRAVENOUS at 13:20

## 2019-10-29 RX ADMIN — METHYLPREDNISOLONE SODIUM SUCCINATE 40 MG: 40 INJECTION, POWDER, LYOPHILIZED, FOR SOLUTION INTRAMUSCULAR; INTRAVENOUS at 01:40

## 2019-10-29 RX ADMIN — ENOXAPARIN SODIUM 40 MG: 40 INJECTION SUBCUTANEOUS at 08:19

## 2019-10-29 RX ADMIN — POTASSIUM CHLORIDE 10 MEQ: 7.46 INJECTION, SOLUTION INTRAVENOUS at 06:58

## 2019-10-29 RX ADMIN — LEVOFLOXACIN 750 MG: 5 INJECTION, SOLUTION INTRAVENOUS at 17:00

## 2019-10-29 RX ADMIN — IPRATROPIUM BROMIDE AND ALBUTEROL SULFATE 1 AMPULE: .5; 3 SOLUTION RESPIRATORY (INHALATION) at 11:06

## 2019-10-29 RX ADMIN — POTASSIUM CHLORIDE 10 MEQ: 7.46 INJECTION, SOLUTION INTRAVENOUS at 12:00

## 2019-10-29 RX ADMIN — MONTELUKAST 10 MG: 10 TABLET, FILM COATED ORAL at 20:16

## 2019-10-30 LAB
ABSOLUTE BANDS #: 0.85 K/UL (ref 0–1)
ABSOLUTE EOS #: 0 K/UL (ref 0–0.4)
ABSOLUTE IMMATURE GRANULOCYTE: ABNORMAL K/UL (ref 0–0.3)
ABSOLUTE LYMPH #: 0.85 K/UL (ref 1–4.8)
ABSOLUTE MONO #: 0.85 K/UL (ref 0.1–1.3)
ALBUMIN SERPL-MCNC: 3.1 G/DL (ref 3.5–5.2)
ALBUMIN/GLOBULIN RATIO: ABNORMAL (ref 1–2.5)
ALP BLD-CCNC: 58 U/L (ref 35–104)
ALT SERPL-CCNC: 11 U/L (ref 5–33)
ANION GAP SERPL CALCULATED.3IONS-SCNC: 12 MMOL/L (ref 9–17)
AST SERPL-CCNC: 15 U/L
BANDS: 4 % (ref 0–10)
BASOPHILS # BLD: 0 % (ref 0–2)
BASOPHILS ABSOLUTE: 0 K/UL (ref 0–0.2)
BILIRUB SERPL-MCNC: 0.25 MG/DL (ref 0.3–1.2)
BUN BLDV-MCNC: 8 MG/DL (ref 6–20)
BUN/CREAT BLD: ABNORMAL (ref 9–20)
CALCIUM SERPL-MCNC: 9 MG/DL (ref 8.6–10.4)
CHLORIDE BLD-SCNC: 91 MMOL/L (ref 98–107)
CO2: 28 MMOL/L (ref 20–31)
CREAT SERPL-MCNC: 0.49 MG/DL (ref 0.5–0.9)
CULTURE: ABNORMAL
DIFFERENTIAL TYPE: ABNORMAL
DIRECT EXAM: ABNORMAL
EKG ATRIAL RATE: 96 BPM
EKG P AXIS: 69 DEGREES
EKG P-R INTERVAL: 114 MS
EKG Q-T INTERVAL: 366 MS
EKG QRS DURATION: 96 MS
EKG QTC CALCULATION (BAZETT): 462 MS
EKG R AXIS: -129 DEGREES
EKG T AXIS: 63 DEGREES
EKG VENTRICULAR RATE: 96 BPM
EOSINOPHILS RELATIVE PERCENT: 0 % (ref 0–4)
GFR AFRICAN AMERICAN: >60 ML/MIN
GFR NON-AFRICAN AMERICAN: >60 ML/MIN
GFR SERPL CREATININE-BSD FRML MDRD: ABNORMAL ML/MIN/{1.73_M2}
GFR SERPL CREATININE-BSD FRML MDRD: ABNORMAL ML/MIN/{1.73_M2}
GLUCOSE BLD-MCNC: 143 MG/DL (ref 70–99)
HCT VFR BLD CALC: 35.6 % (ref 36–46)
HEMOGLOBIN: 12.2 G/DL (ref 12–16)
IMMATURE GRANULOCYTES: ABNORMAL %
LYMPHOCYTES # BLD: 4 % (ref 24–44)
Lab: ABNORMAL
MCH RBC QN AUTO: 30.9 PG (ref 26–34)
MCHC RBC AUTO-ENTMCNC: 34.1 G/DL (ref 31–37)
MCV RBC AUTO: 90.4 FL (ref 80–100)
MONOCYTES # BLD: 4 % (ref 1–7)
MORPHOLOGY: ABNORMAL
NRBC AUTOMATED: ABNORMAL PER 100 WBC
PDW BLD-RTO: 14.5 % (ref 11.5–14.9)
PLATELET # BLD: 488 K/UL (ref 150–450)
PLATELET ESTIMATE: ABNORMAL
PMV BLD AUTO: 7.5 FL (ref 6–12)
POTASSIUM SERPL-SCNC: 4.1 MMOL/L (ref 3.7–5.3)
PROCALCITONIN: 1.14 NG/ML
RBC # BLD: 3.94 M/UL (ref 4–5.2)
RBC # BLD: ABNORMAL 10*6/UL
SEG NEUTROPHILS: 88 % (ref 36–66)
SEGMENTED NEUTROPHILS ABSOLUTE COUNT: 18.65 K/UL (ref 1.3–9.1)
SODIUM BLD-SCNC: 131 MMOL/L (ref 135–144)
SPECIMEN DESCRIPTION: ABNORMAL
TOTAL PROTEIN: 6.5 G/DL (ref 6.4–8.3)
WBC # BLD: 21.2 K/UL (ref 3.5–11)
WBC # BLD: ABNORMAL 10*3/UL

## 2019-10-30 PROCEDURE — 99233 SBSQ HOSP IP/OBS HIGH 50: CPT | Performed by: INTERNAL MEDICINE

## 2019-10-30 PROCEDURE — 6370000000 HC RX 637 (ALT 250 FOR IP): Performed by: STUDENT IN AN ORGANIZED HEALTH CARE EDUCATION/TRAINING PROGRAM

## 2019-10-30 PROCEDURE — 85025 COMPLETE CBC W/AUTO DIFF WBC: CPT

## 2019-10-30 PROCEDURE — 80053 COMPREHEN METABOLIC PANEL: CPT

## 2019-10-30 PROCEDURE — 94761 N-INVAS EAR/PLS OXIMETRY MLT: CPT

## 2019-10-30 PROCEDURE — 84145 PROCALCITONIN (PCT): CPT

## 2019-10-30 PROCEDURE — 2580000003 HC RX 258: Performed by: STUDENT IN AN ORGANIZED HEALTH CARE EDUCATION/TRAINING PROGRAM

## 2019-10-30 PROCEDURE — 6370000000 HC RX 637 (ALT 250 FOR IP): Performed by: INTERNAL MEDICINE

## 2019-10-30 PROCEDURE — 36415 COLL VENOUS BLD VENIPUNCTURE: CPT

## 2019-10-30 PROCEDURE — 2700000000 HC OXYGEN THERAPY PER DAY

## 2019-10-30 PROCEDURE — 2060000000 HC ICU INTERMEDIATE R&B

## 2019-10-30 PROCEDURE — 6370000000 HC RX 637 (ALT 250 FOR IP): Performed by: NURSE PRACTITIONER

## 2019-10-30 PROCEDURE — 6360000002 HC RX W HCPCS: Performed by: STUDENT IN AN ORGANIZED HEALTH CARE EDUCATION/TRAINING PROGRAM

## 2019-10-30 PROCEDURE — 99232 SBSQ HOSP IP/OBS MODERATE 35: CPT | Performed by: INTERNAL MEDICINE

## 2019-10-30 PROCEDURE — 97110 THERAPEUTIC EXERCISES: CPT

## 2019-10-30 PROCEDURE — 94640 AIRWAY INHALATION TREATMENT: CPT

## 2019-10-30 PROCEDURE — 97116 GAIT TRAINING THERAPY: CPT

## 2019-10-30 PROCEDURE — 93010 ELECTROCARDIOGRAM REPORT: CPT | Performed by: INTERNAL MEDICINE

## 2019-10-30 RX ORDER — DEXTROMETHORPHAN POLISTIREX 30 MG/5ML
30 SUSPENSION ORAL 2 TIMES DAILY PRN
Status: DISCONTINUED | OUTPATIENT
Start: 2019-10-30 | End: 2019-11-01 | Stop reason: HOSPADM

## 2019-10-30 RX ADMIN — METHYLPREDNISOLONE SODIUM SUCCINATE 40 MG: 40 INJECTION, POWDER, LYOPHILIZED, FOR SOLUTION INTRAMUSCULAR; INTRAVENOUS at 14:28

## 2019-10-30 RX ADMIN — MONTELUKAST 10 MG: 10 TABLET, FILM COATED ORAL at 21:07

## 2019-10-30 RX ADMIN — IPRATROPIUM BROMIDE AND ALBUTEROL SULFATE 1 AMPULE: .5; 3 SOLUTION RESPIRATORY (INHALATION) at 11:18

## 2019-10-30 RX ADMIN — METHYLPREDNISOLONE SODIUM SUCCINATE 40 MG: 40 INJECTION, POWDER, LYOPHILIZED, FOR SOLUTION INTRAMUSCULAR; INTRAVENOUS at 06:29

## 2019-10-30 RX ADMIN — Medication 2 PUFF: at 21:07

## 2019-10-30 RX ADMIN — LEVOFLOXACIN 750 MG: 5 INJECTION, SOLUTION INTRAVENOUS at 17:05

## 2019-10-30 RX ADMIN — LISINOPRIL 20 MG: 20 TABLET ORAL at 02:30

## 2019-10-30 RX ADMIN — FAMOTIDINE 20 MG: 20 TABLET, FILM COATED ORAL at 09:17

## 2019-10-30 RX ADMIN — IPRATROPIUM BROMIDE AND ALBUTEROL SULFATE 1 AMPULE: .5; 3 SOLUTION RESPIRATORY (INHALATION) at 08:29

## 2019-10-30 RX ADMIN — LISINOPRIL 20 MG: 20 TABLET ORAL at 09:17

## 2019-10-30 RX ADMIN — IPRATROPIUM BROMIDE AND ALBUTEROL SULFATE 1 AMPULE: .5; 3 SOLUTION RESPIRATORY (INHALATION) at 19:04

## 2019-10-30 RX ADMIN — DEXTROMETHORPHAN POLISTIREX 30 MG: 30 SUSPENSION ORAL at 03:16

## 2019-10-30 RX ADMIN — METHYLPREDNISOLONE SODIUM SUCCINATE 40 MG: 40 INJECTION, POWDER, LYOPHILIZED, FOR SOLUTION INTRAMUSCULAR; INTRAVENOUS at 01:30

## 2019-10-30 RX ADMIN — IPRATROPIUM BROMIDE AND ALBUTEROL SULFATE 1 AMPULE: .5; 3 SOLUTION RESPIRATORY (INHALATION) at 15:08

## 2019-10-30 RX ADMIN — ONDANSETRON 4 MG: 2 INJECTION INTRAMUSCULAR; INTRAVENOUS at 00:19

## 2019-10-30 RX ADMIN — Medication 10 ML: at 21:08

## 2019-10-30 RX ADMIN — FAMOTIDINE 20 MG: 20 TABLET, FILM COATED ORAL at 21:07

## 2019-10-30 RX ADMIN — Medication 2 PUFF: at 09:22

## 2019-10-30 ASSESSMENT — PAIN SCALES - GENERAL
PAINLEVEL_OUTOF10: 0

## 2019-10-31 ENCOUNTER — APPOINTMENT (OUTPATIENT)
Dept: CT IMAGING | Age: 54
DRG: 139 | End: 2019-10-31
Payer: COMMERCIAL

## 2019-10-31 LAB
ABSOLUTE BANDS #: 0.37 K/UL (ref 0–1)
ABSOLUTE EOS #: 0 K/UL (ref 0–0.4)
ABSOLUTE IMMATURE GRANULOCYTE: ABNORMAL K/UL (ref 0–0.3)
ABSOLUTE LYMPH #: 2.01 K/UL (ref 1–4.8)
ABSOLUTE MONO #: 0.37 K/UL (ref 0.1–1.3)
ALBUMIN SERPL-MCNC: 3 G/DL (ref 3.5–5.2)
ALBUMIN/GLOBULIN RATIO: ABNORMAL (ref 1–2.5)
ALP BLD-CCNC: 53 U/L (ref 35–104)
ALT SERPL-CCNC: 12 U/L (ref 5–33)
ANION GAP SERPL CALCULATED.3IONS-SCNC: 11 MMOL/L (ref 9–17)
AST SERPL-CCNC: 12 U/L
BANDS: 2 % (ref 0–10)
BASOPHILS # BLD: 0 % (ref 0–2)
BASOPHILS ABSOLUTE: 0 K/UL (ref 0–0.2)
BILIRUB SERPL-MCNC: 0.24 MG/DL (ref 0.3–1.2)
BUN BLDV-MCNC: 11 MG/DL (ref 6–20)
BUN/CREAT BLD: ABNORMAL (ref 9–20)
CALCIUM SERPL-MCNC: 9.1 MG/DL (ref 8.6–10.4)
CHLORIDE BLD-SCNC: 91 MMOL/L (ref 98–107)
CO2: 28 MMOL/L (ref 20–31)
CREAT SERPL-MCNC: 0.56 MG/DL (ref 0.5–0.9)
DIFFERENTIAL TYPE: ABNORMAL
EOSINOPHILS RELATIVE PERCENT: 0 % (ref 0–4)
GFR AFRICAN AMERICAN: >60 ML/MIN
GFR NON-AFRICAN AMERICAN: >60 ML/MIN
GFR SERPL CREATININE-BSD FRML MDRD: ABNORMAL ML/MIN/{1.73_M2}
GFR SERPL CREATININE-BSD FRML MDRD: ABNORMAL ML/MIN/{1.73_M2}
GLUCOSE BLD-MCNC: 106 MG/DL (ref 70–99)
HCT VFR BLD CALC: 37.4 % (ref 36–46)
HEMOGLOBIN: 12.2 G/DL (ref 12–16)
IMMATURE GRANULOCYTES: ABNORMAL %
LYMPHOCYTES # BLD: 11 % (ref 24–44)
MCH RBC QN AUTO: 29.8 PG (ref 26–34)
MCHC RBC AUTO-ENTMCNC: 32.6 G/DL (ref 31–37)
MCV RBC AUTO: 91.5 FL (ref 80–100)
METAMYELOCYTES ABSOLUTE COUNT: 0.55 K/UL
METAMYELOCYTES: 3 %
MONOCYTES # BLD: 2 % (ref 1–7)
MORPHOLOGY: ABNORMAL
NRBC AUTOMATED: ABNORMAL PER 100 WBC
PDW BLD-RTO: 14.3 % (ref 11.5–14.9)
PLATELET # BLD: 510 K/UL (ref 150–450)
PLATELET ESTIMATE: ABNORMAL
PMV BLD AUTO: 7.4 FL (ref 6–12)
POTASSIUM SERPL-SCNC: 3.7 MMOL/L (ref 3.7–5.3)
RBC # BLD: 4.09 M/UL (ref 4–5.2)
RBC # BLD: ABNORMAL 10*6/UL
SEG NEUTROPHILS: 82 % (ref 36–66)
SEGMENTED NEUTROPHILS ABSOLUTE COUNT: 15 K/UL (ref 1.3–9.1)
SODIUM BLD-SCNC: 130 MMOL/L (ref 135–144)
TOTAL PROTEIN: 6.4 G/DL (ref 6.4–8.3)
WBC # BLD: 18.3 K/UL (ref 3.5–11)
WBC # BLD: ABNORMAL 10*3/UL

## 2019-10-31 PROCEDURE — 71275 CT ANGIOGRAPHY CHEST: CPT

## 2019-10-31 PROCEDURE — 2700000000 HC OXYGEN THERAPY PER DAY

## 2019-10-31 PROCEDURE — 36415 COLL VENOUS BLD VENIPUNCTURE: CPT

## 2019-10-31 PROCEDURE — 99232 SBSQ HOSP IP/OBS MODERATE 35: CPT | Performed by: INTERNAL MEDICINE

## 2019-10-31 PROCEDURE — 6370000000 HC RX 637 (ALT 250 FOR IP): Performed by: STUDENT IN AN ORGANIZED HEALTH CARE EDUCATION/TRAINING PROGRAM

## 2019-10-31 PROCEDURE — 94761 N-INVAS EAR/PLS OXIMETRY MLT: CPT

## 2019-10-31 PROCEDURE — 94640 AIRWAY INHALATION TREATMENT: CPT

## 2019-10-31 PROCEDURE — 6370000000 HC RX 637 (ALT 250 FOR IP): Performed by: INTERNAL MEDICINE

## 2019-10-31 PROCEDURE — 97116 GAIT TRAINING THERAPY: CPT

## 2019-10-31 PROCEDURE — 2580000003 HC RX 258: Performed by: STUDENT IN AN ORGANIZED HEALTH CARE EDUCATION/TRAINING PROGRAM

## 2019-10-31 PROCEDURE — 6360000002 HC RX W HCPCS: Performed by: STUDENT IN AN ORGANIZED HEALTH CARE EDUCATION/TRAINING PROGRAM

## 2019-10-31 PROCEDURE — 2060000000 HC ICU INTERMEDIATE R&B

## 2019-10-31 PROCEDURE — 6360000004 HC RX CONTRAST MEDICATION: Performed by: INTERNAL MEDICINE

## 2019-10-31 PROCEDURE — 85025 COMPLETE CBC W/AUTO DIFF WBC: CPT

## 2019-10-31 PROCEDURE — 2580000003 HC RX 258: Performed by: INTERNAL MEDICINE

## 2019-10-31 PROCEDURE — 80053 COMPREHEN METABOLIC PANEL: CPT

## 2019-10-31 PROCEDURE — 2580000003 HC RX 258: Performed by: EMERGENCY MEDICINE

## 2019-10-31 RX ORDER — SODIUM CHLORIDE 0.9 % (FLUSH) 0.9 %
10 SYRINGE (ML) INJECTION PRN
Status: DISCONTINUED | OUTPATIENT
Start: 2019-10-31 | End: 2019-11-01 | Stop reason: HOSPADM

## 2019-10-31 RX ORDER — 0.9 % SODIUM CHLORIDE 0.9 %
80 INTRAVENOUS SOLUTION INTRAVENOUS ONCE
Status: COMPLETED | OUTPATIENT
Start: 2019-10-31 | End: 2019-10-31

## 2019-10-31 RX ADMIN — IOPAMIDOL 100 ML: 755 INJECTION, SOLUTION INTRAVENOUS at 11:28

## 2019-10-31 RX ADMIN — PREDNISONE 40 MG: 20 TABLET ORAL at 09:56

## 2019-10-31 RX ADMIN — FAMOTIDINE 20 MG: 20 TABLET, FILM COATED ORAL at 20:41

## 2019-10-31 RX ADMIN — FAMOTIDINE 20 MG: 20 TABLET, FILM COATED ORAL at 09:56

## 2019-10-31 RX ADMIN — Medication 10 ML: at 11:28

## 2019-10-31 RX ADMIN — LISINOPRIL 20 MG: 20 TABLET ORAL at 20:41

## 2019-10-31 RX ADMIN — ACETAMINOPHEN 650 MG: 325 TABLET, FILM COATED ORAL at 12:07

## 2019-10-31 RX ADMIN — Medication 2 PUFF: at 20:41

## 2019-10-31 RX ADMIN — IPRATROPIUM BROMIDE AND ALBUTEROL SULFATE 1 AMPULE: .5; 3 SOLUTION RESPIRATORY (INHALATION) at 19:59

## 2019-10-31 RX ADMIN — Medication 10 ML: at 20:43

## 2019-10-31 RX ADMIN — IPRATROPIUM BROMIDE AND ALBUTEROL SULFATE 1 AMPULE: .5; 3 SOLUTION RESPIRATORY (INHALATION) at 07:13

## 2019-10-31 RX ADMIN — LEVOFLOXACIN 750 MG: 5 INJECTION, SOLUTION INTRAVENOUS at 16:31

## 2019-10-31 RX ADMIN — LISINOPRIL 20 MG: 20 TABLET ORAL at 12:11

## 2019-10-31 RX ADMIN — Medication 2 PUFF: at 09:55

## 2019-10-31 RX ADMIN — ENOXAPARIN SODIUM 40 MG: 40 INJECTION SUBCUTANEOUS at 09:54

## 2019-10-31 RX ADMIN — SODIUM CHLORIDE 80 ML: 9 INJECTION, SOLUTION INTRAVENOUS at 11:28

## 2019-10-31 RX ADMIN — IPRATROPIUM BROMIDE AND ALBUTEROL SULFATE 1 AMPULE: .5; 3 SOLUTION RESPIRATORY (INHALATION) at 15:11

## 2019-10-31 RX ADMIN — MONTELUKAST 10 MG: 10 TABLET, FILM COATED ORAL at 20:41

## 2019-10-31 RX ADMIN — Medication 10 ML: at 09:56

## 2019-10-31 RX ADMIN — IPRATROPIUM BROMIDE AND ALBUTEROL SULFATE 1 AMPULE: .5; 3 SOLUTION RESPIRATORY (INHALATION) at 11:45

## 2019-10-31 ASSESSMENT — PAIN SCALES - GENERAL
PAINLEVEL_OUTOF10: 2
PAINLEVEL_OUTOF10: 0
PAINLEVEL_OUTOF10: 4

## 2019-11-01 VITALS
HEIGHT: 59 IN | DIASTOLIC BLOOD PRESSURE: 72 MMHG | HEART RATE: 96 BPM | RESPIRATION RATE: 17 BRPM | BODY MASS INDEX: 32.84 KG/M2 | WEIGHT: 162.92 LBS | SYSTOLIC BLOOD PRESSURE: 132 MMHG | TEMPERATURE: 98 F | OXYGEN SATURATION: 93 %

## 2019-11-01 LAB
ABSOLUTE BANDS #: 0.41 K/UL (ref 0–1)
ABSOLUTE EOS #: 0 K/UL (ref 0–0.4)
ABSOLUTE IMMATURE GRANULOCYTE: ABNORMAL K/UL (ref 0–0.3)
ABSOLUTE LYMPH #: 4.28 K/UL (ref 1–4.8)
ABSOLUTE MONO #: 1.1 K/UL (ref 0.1–1.3)
ALBUMIN SERPL-MCNC: 3 G/DL (ref 3.5–5.2)
ALBUMIN/GLOBULIN RATIO: ABNORMAL (ref 1–2.5)
ALP BLD-CCNC: 44 U/L (ref 35–104)
ALT SERPL-CCNC: 12 U/L (ref 5–33)
ANION GAP SERPL CALCULATED.3IONS-SCNC: 12 MMOL/L (ref 9–17)
AST SERPL-CCNC: 11 U/L
BANDS: 3 % (ref 0–10)
BASOPHILS # BLD: 0 % (ref 0–2)
BASOPHILS ABSOLUTE: 0 K/UL (ref 0–0.2)
BILIRUB SERPL-MCNC: 0.27 MG/DL (ref 0.3–1.2)
BUN BLDV-MCNC: 10 MG/DL (ref 6–20)
BUN/CREAT BLD: ABNORMAL (ref 9–20)
CALCIUM SERPL-MCNC: 9 MG/DL (ref 8.6–10.4)
CHLORIDE BLD-SCNC: 93 MMOL/L (ref 98–107)
CO2: 29 MMOL/L (ref 20–31)
CREAT SERPL-MCNC: 0.57 MG/DL (ref 0.5–0.9)
DIFFERENTIAL TYPE: ABNORMAL
EOSINOPHILS RELATIVE PERCENT: 0 % (ref 0–4)
GFR AFRICAN AMERICAN: >60 ML/MIN
GFR NON-AFRICAN AMERICAN: >60 ML/MIN
GFR SERPL CREATININE-BSD FRML MDRD: ABNORMAL ML/MIN/{1.73_M2}
GFR SERPL CREATININE-BSD FRML MDRD: ABNORMAL ML/MIN/{1.73_M2}
GLUCOSE BLD-MCNC: 119 MG/DL (ref 70–99)
HCT VFR BLD CALC: 38 % (ref 36–46)
HEMOGLOBIN: 12.4 G/DL (ref 12–16)
IMMATURE GRANULOCYTES: ABNORMAL %
LYMPHOCYTES # BLD: 31 % (ref 24–44)
MAGNESIUM: 2.4 MG/DL (ref 1.6–2.6)
MCH RBC QN AUTO: 29.8 PG (ref 26–34)
MCHC RBC AUTO-ENTMCNC: 32.7 G/DL (ref 31–37)
MCV RBC AUTO: 91 FL (ref 80–100)
MONOCYTES # BLD: 8 % (ref 1–7)
MORPHOLOGY: NORMAL
NRBC AUTOMATED: ABNORMAL PER 100 WBC
PDW BLD-RTO: 14.2 % (ref 11.5–14.9)
PLATELET # BLD: 479 K/UL (ref 150–450)
PLATELET ESTIMATE: ABNORMAL
PMV BLD AUTO: 7.6 FL (ref 6–12)
POTASSIUM SERPL-SCNC: 3.5 MMOL/L (ref 3.7–5.3)
PROCALCITONIN: 0.19 NG/ML
RBC # BLD: 4.17 M/UL (ref 4–5.2)
RBC # BLD: ABNORMAL 10*6/UL
SEG NEUTROPHILS: 58 % (ref 36–66)
SEGMENTED NEUTROPHILS ABSOLUTE COUNT: 8.01 K/UL (ref 1.3–9.1)
SODIUM BLD-SCNC: 134 MMOL/L (ref 135–144)
TOTAL PROTEIN: 6.1 G/DL (ref 6.4–8.3)
WBC # BLD: 13.8 K/UL (ref 3.5–11)
WBC # BLD: ABNORMAL 10*3/UL

## 2019-11-01 PROCEDURE — 94761 N-INVAS EAR/PLS OXIMETRY MLT: CPT

## 2019-11-01 PROCEDURE — 2700000000 HC OXYGEN THERAPY PER DAY

## 2019-11-01 PROCEDURE — 85025 COMPLETE CBC W/AUTO DIFF WBC: CPT

## 2019-11-01 PROCEDURE — 36415 COLL VENOUS BLD VENIPUNCTURE: CPT

## 2019-11-01 PROCEDURE — 2580000003 HC RX 258: Performed by: STUDENT IN AN ORGANIZED HEALTH CARE EDUCATION/TRAINING PROGRAM

## 2019-11-01 PROCEDURE — 84145 PROCALCITONIN (PCT): CPT

## 2019-11-01 PROCEDURE — 99239 HOSP IP/OBS DSCHRG MGMT >30: CPT | Performed by: INTERNAL MEDICINE

## 2019-11-01 PROCEDURE — 6370000000 HC RX 637 (ALT 250 FOR IP): Performed by: STUDENT IN AN ORGANIZED HEALTH CARE EDUCATION/TRAINING PROGRAM

## 2019-11-01 PROCEDURE — 6360000002 HC RX W HCPCS: Performed by: STUDENT IN AN ORGANIZED HEALTH CARE EDUCATION/TRAINING PROGRAM

## 2019-11-01 PROCEDURE — 83735 ASSAY OF MAGNESIUM: CPT

## 2019-11-01 PROCEDURE — 6370000000 HC RX 637 (ALT 250 FOR IP): Performed by: INTERNAL MEDICINE

## 2019-11-01 PROCEDURE — 80053 COMPREHEN METABOLIC PANEL: CPT

## 2019-11-01 PROCEDURE — 94640 AIRWAY INHALATION TREATMENT: CPT

## 2019-11-01 RX ORDER — LISINOPRIL 20 MG/1
20 TABLET ORAL 2 TIMES DAILY
Qty: 30 TABLET | Refills: 3 | Status: SHIPPED | OUTPATIENT
Start: 2019-11-01

## 2019-11-01 RX ORDER — ATORVASTATIN CALCIUM 40 MG/1
40 TABLET, FILM COATED ORAL DAILY
Qty: 90 TABLET | Refills: 1 | Status: SHIPPED | OUTPATIENT
Start: 2019-11-01

## 2019-11-01 RX ORDER — LEVOFLOXACIN 500 MG/1
500 TABLET, FILM COATED ORAL DAILY
Qty: 7 TABLET | Refills: 0 | Status: SHIPPED | OUTPATIENT
Start: 2019-11-01 | End: 2019-11-08

## 2019-11-01 RX ORDER — PREDNISONE 20 MG/1
20 TABLET ORAL DAILY
Qty: 7 TABLET | Refills: 0 | Status: SHIPPED | OUTPATIENT
Start: 2019-11-01 | End: 2019-11-08

## 2019-11-01 RX ADMIN — Medication 10 ML: at 08:52

## 2019-11-01 RX ADMIN — LISINOPRIL 20 MG: 20 TABLET ORAL at 08:52

## 2019-11-01 RX ADMIN — ENOXAPARIN SODIUM 40 MG: 40 INJECTION SUBCUTANEOUS at 08:53

## 2019-11-01 RX ADMIN — IPRATROPIUM BROMIDE AND ALBUTEROL SULFATE 1 AMPULE: .5; 3 SOLUTION RESPIRATORY (INHALATION) at 14:51

## 2019-11-01 RX ADMIN — IPRATROPIUM BROMIDE AND ALBUTEROL SULFATE 1 AMPULE: .5; 3 SOLUTION RESPIRATORY (INHALATION) at 10:32

## 2019-11-01 RX ADMIN — Medication 2 PUFF: at 08:52

## 2019-11-01 RX ADMIN — FAMOTIDINE 20 MG: 20 TABLET, FILM COATED ORAL at 08:52

## 2019-11-01 RX ADMIN — PREDNISONE 40 MG: 20 TABLET ORAL at 08:52

## 2019-11-01 RX ADMIN — IPRATROPIUM BROMIDE AND ALBUTEROL SULFATE 1 AMPULE: .5; 3 SOLUTION RESPIRATORY (INHALATION) at 06:45

## 2019-11-01 RX ADMIN — POTASSIUM CHLORIDE 40 MEQ: 20 TABLET, EXTENDED RELEASE ORAL at 08:52

## 2019-11-03 LAB
CULTURE: NORMAL
CULTURE: NORMAL
Lab: NORMAL
Lab: NORMAL
SPECIMEN DESCRIPTION: NORMAL
SPECIMEN DESCRIPTION: NORMAL

## 2022-11-08 ENCOUNTER — APPOINTMENT (OUTPATIENT)
Dept: CT IMAGING | Age: 57
DRG: 720 | End: 2022-11-08
Payer: COMMERCIAL

## 2022-11-08 ENCOUNTER — APPOINTMENT (OUTPATIENT)
Dept: GENERAL RADIOLOGY | Age: 57
DRG: 720 | End: 2022-11-08
Payer: COMMERCIAL

## 2022-11-08 ENCOUNTER — HOSPITAL ENCOUNTER (INPATIENT)
Age: 57
LOS: 8 days | Discharge: HOME | DRG: 720 | End: 2022-11-16
Attending: EMERGENCY MEDICINE | Admitting: INTERNAL MEDICINE
Payer: COMMERCIAL

## 2022-11-08 DIAGNOSIS — J96.01 ACUTE RESPIRATORY FAILURE WITH HYPOXIA (HCC): ICD-10-CM

## 2022-11-08 DIAGNOSIS — J18.9 PNEUMONIA OF BOTH LUNGS DUE TO INFECTIOUS ORGANISM, UNSPECIFIED PART OF LUNG: ICD-10-CM

## 2022-11-08 DIAGNOSIS — J44.1 COPD EXACERBATION (HCC): Primary | ICD-10-CM

## 2022-11-08 PROBLEM — J96.00 ACUTE RESPIRATORY FAILURE (HCC): Status: ACTIVE | Noted: 2022-11-08

## 2022-11-08 LAB
ABSOLUTE BANDS #: 2.58 K/UL (ref 0–1)
ABSOLUTE EOS #: 0 K/UL (ref 0–0.4)
ABSOLUTE LYMPH #: 2.39 K/UL (ref 1–4.8)
ABSOLUTE MONO #: 1.29 K/UL (ref 0.1–1.3)
ADENOVIRUS PCR: DETECTED
ALLEN TEST: ABNORMAL
ALLEN TEST: ABNORMAL
ANION GAP SERPL CALCULATED.3IONS-SCNC: 13 MMOL/L (ref 9–17)
BANDS: 14 % (ref 0–10)
BASOPHILS # BLD: 0 % (ref 0–2)
BASOPHILS ABSOLUTE: 0 K/UL (ref 0–0.2)
BORDETELLA PARAPERTUSSIS: NOT DETECTED
BORDETELLA PERTUSSIS PCR: NOT DETECTED
BUN BLDV-MCNC: 15 MG/DL (ref 6–20)
CALCIUM SERPL-MCNC: 10.1 MG/DL (ref 8.6–10.4)
CARBOXYHEMOGLOBIN: 2.2 % (ref 0–5)
CARBOXYHEMOGLOBIN: 3.5 % (ref 0–5)
CHLAMYDIA PNEUMONIAE BY PCR: NOT DETECTED
CHLORIDE BLD-SCNC: 87 MMOL/L (ref 98–107)
CO2: 30 MMOL/L (ref 20–31)
CORONAVIRUS 229E PCR: NOT DETECTED
CORONAVIRUS HKU1 PCR: NOT DETECTED
CORONAVIRUS NL63 PCR: NOT DETECTED
CORONAVIRUS OC43 PCR: NOT DETECTED
CREAT SERPL-MCNC: 0.71 MG/DL (ref 0.5–0.9)
EKG ATRIAL RATE: 105 BPM
EKG P AXIS: 76 DEGREES
EKG P-R INTERVAL: 114 MS
EKG Q-T INTERVAL: 382 MS
EKG QRS DURATION: 92 MS
EKG QTC CALCULATION (BAZETT): 504 MS
EKG R AXIS: -163 DEGREES
EKG T AXIS: -30 DEGREES
EKG VENTRICULAR RATE: 105 BPM
EOSINOPHILS RELATIVE PERCENT: 0 % (ref 0–4)
FIO2: 50
GFR SERPL CREATININE-BSD FRML MDRD: >60 ML/MIN/1.73M2
GLUCOSE BLD-MCNC: 158 MG/DL (ref 70–99)
HCO3 ARTERIAL: 33.3 MMOL/L (ref 22–26)
HCO3 ARTERIAL: 34.7 MMOL/L (ref 22–26)
HCT VFR BLD CALC: 49.1 % (ref 36–46)
HEMOGLOBIN: 15.9 G/DL (ref 12–16)
HUMAN METAPNEUMOVIRUS PCR: NOT DETECTED
INFLUENZA A BY PCR: NOT DETECTED
INFLUENZA A: NOT DETECTED
INFLUENZA B BY PCR: NOT DETECTED
INFLUENZA B: NOT DETECTED
INR BLD: 1
LYMPHOCYTES # BLD: 13 % (ref 24–44)
MAGNESIUM: 2.3 MG/DL (ref 1.6–2.6)
MCH RBC QN AUTO: 29.9 PG (ref 26–34)
MCHC RBC AUTO-ENTMCNC: 32.4 G/DL (ref 31–37)
MCV RBC AUTO: 92.2 FL (ref 80–100)
METAMYELOCYTES ABSOLUTE COUNT: 0.37 K/UL
METAMYELOCYTES: 2 %
METHEMOGLOBIN: 0.4 % (ref 0–1.9)
METHEMOGLOBIN: 0.5 % (ref 0–1.9)
MODE: ABNORMAL
MONOCYTES # BLD: 7 % (ref 1–7)
MORPHOLOGY: ABNORMAL
MORPHOLOGY: ABNORMAL
MYCOPLASMA PNEUMONIAE PCR: NOT DETECTED
O2 DEVICE/FLOW/%: ABNORMAL
O2 DEVICE/FLOW/%: ABNORMAL
O2 SAT, ARTERIAL: 86.7 % (ref 95–98)
O2 SAT, ARTERIAL: 87.7 % (ref 95–98)
PARAINFLUENZA 1 PCR: NOT DETECTED
PARAINFLUENZA 2 PCR: NOT DETECTED
PARAINFLUENZA 3 PCR: NOT DETECTED
PARAINFLUENZA 4 PCR: NOT DETECTED
PARTIAL THROMBOPLASTIN TIME: 30 SEC (ref 24–36)
PATIENT TEMP: 37
PCO2 ARTERIAL: 58.3 MMHG (ref 35–45)
PCO2 ARTERIAL: 61.8 MMHG (ref 35–45)
PDW BLD-RTO: 13.9 % (ref 11.5–14.9)
PH ARTERIAL: 7.36 (ref 7.35–7.45)
PH ARTERIAL: 7.37 (ref 7.35–7.45)
PHOSPHORUS: 3.1 MG/DL (ref 2.6–4.5)
PLATELET # BLD: 542 K/UL (ref 150–450)
PMV BLD AUTO: 8.2 FL (ref 6–12)
PO2 ARTERIAL: 58 MMHG (ref 80–100)
PO2 ARTERIAL: 63.4 MMHG (ref 80–100)
POSITIVE BASE EXCESS, ART: 8 MMOL/L (ref 0–2)
POSITIVE BASE EXCESS, ART: 9.2 MMOL/L (ref 0–2)
POTASSIUM SERPL-SCNC: 4.5 MMOL/L (ref 3.7–5.3)
PROTHROMBIN TIME: 13.1 SEC (ref 11.8–14.6)
PT. POSITION: ABNORMAL
RBC # BLD: 5.33 M/UL (ref 4–5.2)
RESP SYNCYTIAL VIRUS PCR: NOT DETECTED
RESPIRATORY RATE: 25
RESPIRATORY RATE: 28
RHINO/ENTEROVIRUS PCR: NOT DETECTED
SAMPLE SITE: ABNORMAL
SAMPLE SITE: ABNORMAL
SARS-COV-2 RNA, RT PCR: NOT DETECTED
SARS-COV-2, PCR: NOT DETECTED
SEG NEUTROPHILS: 64 % (ref 36–66)
SEGMENTED NEUTROPHILS ABSOLUTE COUNT: 11.77 K/UL (ref 1.3–9.1)
SODIUM BLD-SCNC: 130 MMOL/L (ref 135–144)
SOURCE: NORMAL
SPECIMEN DESCRIPTION: ABNORMAL
SPECIMEN DESCRIPTION: NORMAL
TEXT FOR RESPIRATORY: ABNORMAL
TOTAL RATE: 28
TROPONIN, HIGH SENSITIVITY: 50 NG/L (ref 0–14)
TROPONIN, HIGH SENSITIVITY: 56 NG/L (ref 0–14)
WBC # BLD: 18.4 K/UL (ref 3.5–11)

## 2022-11-08 PROCEDURE — 99285 EMERGENCY DEPT VISIT HI MDM: CPT

## 2022-11-08 PROCEDURE — 86738 MYCOPLASMA ANTIBODY: CPT

## 2022-11-08 PROCEDURE — 94761 N-INVAS EAR/PLS OXIMETRY MLT: CPT

## 2022-11-08 PROCEDURE — 6370000000 HC RX 637 (ALT 250 FOR IP): Performed by: EMERGENCY MEDICINE

## 2022-11-08 PROCEDURE — 93010 ELECTROCARDIOGRAM REPORT: CPT | Performed by: INTERNAL MEDICINE

## 2022-11-08 PROCEDURE — 80048 BASIC METABOLIC PNL TOTAL CA: CPT

## 2022-11-08 PROCEDURE — 6370000000 HC RX 637 (ALT 250 FOR IP): Performed by: INTERNAL MEDICINE

## 2022-11-08 PROCEDURE — 93005 ELECTROCARDIOGRAM TRACING: CPT | Performed by: EMERGENCY MEDICINE

## 2022-11-08 PROCEDURE — 36415 COLL VENOUS BLD VENIPUNCTURE: CPT

## 2022-11-08 PROCEDURE — 87070 CULTURE OTHR SPECIMN AEROBIC: CPT

## 2022-11-08 PROCEDURE — 87205 SMEAR GRAM STAIN: CPT

## 2022-11-08 PROCEDURE — 71045 X-RAY EXAM CHEST 1 VIEW: CPT

## 2022-11-08 PROCEDURE — 6360000002 HC RX W HCPCS: Performed by: INTERNAL MEDICINE

## 2022-11-08 PROCEDURE — 71260 CT THORAX DX C+: CPT | Performed by: EMERGENCY MEDICINE

## 2022-11-08 PROCEDURE — 84484 ASSAY OF TROPONIN QUANT: CPT

## 2022-11-08 PROCEDURE — 86480 TB TEST CELL IMMUN MEASURE: CPT

## 2022-11-08 PROCEDURE — 2580000003 HC RX 258: Performed by: EMERGENCY MEDICINE

## 2022-11-08 PROCEDURE — 96374 THER/PROPH/DIAG INJ IV PUSH: CPT

## 2022-11-08 PROCEDURE — 6360000004 HC RX CONTRAST MEDICATION: Performed by: EMERGENCY MEDICINE

## 2022-11-08 PROCEDURE — 87389 HIV-1 AG W/HIV-1&-2 AB AG IA: CPT

## 2022-11-08 PROCEDURE — 0202U NFCT DS 22 TRGT SARS-COV-2: CPT

## 2022-11-08 PROCEDURE — 2700000000 HC OXYGEN THERAPY PER DAY

## 2022-11-08 PROCEDURE — 85730 THROMBOPLASTIN TIME PARTIAL: CPT

## 2022-11-08 PROCEDURE — 87636 SARSCOV2 & INF A&B AMP PRB: CPT

## 2022-11-08 PROCEDURE — 2000000000 HC ICU R&B

## 2022-11-08 PROCEDURE — 99223 1ST HOSP IP/OBS HIGH 75: CPT | Performed by: INTERNAL MEDICINE

## 2022-11-08 PROCEDURE — 85610 PROTHROMBIN TIME: CPT

## 2022-11-08 PROCEDURE — 82805 BLOOD GASES W/O2 SATURATION: CPT

## 2022-11-08 PROCEDURE — 83735 ASSAY OF MAGNESIUM: CPT

## 2022-11-08 PROCEDURE — 6360000002 HC RX W HCPCS: Performed by: EMERGENCY MEDICINE

## 2022-11-08 PROCEDURE — 85025 COMPLETE CBC W/AUTO DIFF WBC: CPT

## 2022-11-08 PROCEDURE — 94640 AIRWAY INHALATION TREATMENT: CPT

## 2022-11-08 PROCEDURE — 36600 WITHDRAWAL OF ARTERIAL BLOOD: CPT

## 2022-11-08 PROCEDURE — 2580000003 HC RX 258: Performed by: INTERNAL MEDICINE

## 2022-11-08 PROCEDURE — 84100 ASSAY OF PHOSPHORUS: CPT

## 2022-11-08 RX ORDER — METHYLPREDNISOLONE SODIUM SUCCINATE 125 MG/2ML
125 INJECTION, POWDER, LYOPHILIZED, FOR SOLUTION INTRAMUSCULAR; INTRAVENOUS ONCE
Status: COMPLETED | OUTPATIENT
Start: 2022-11-08 | End: 2022-11-08

## 2022-11-08 RX ORDER — FLUTICASONE PROPIONATE 50 MCG
1 SPRAY, SUSPENSION (ML) NASAL DAILY
COMMUNITY

## 2022-11-08 RX ORDER — LISINOPRIL 20 MG/1
20 TABLET ORAL 2 TIMES DAILY
Status: DISCONTINUED | OUTPATIENT
Start: 2022-11-08 | End: 2022-11-10

## 2022-11-08 RX ORDER — METHYLPREDNISOLONE SODIUM SUCCINATE 40 MG/ML
40 INJECTION, POWDER, LYOPHILIZED, FOR SOLUTION INTRAMUSCULAR; INTRAVENOUS EVERY 8 HOURS
Status: DISCONTINUED | OUTPATIENT
Start: 2022-11-08 | End: 2022-11-08

## 2022-11-08 RX ORDER — SODIUM CHLORIDE 0.9 % (FLUSH) 0.9 %
5-40 SYRINGE (ML) INJECTION PRN
Status: DISCONTINUED | OUTPATIENT
Start: 2022-11-08 | End: 2022-11-17 | Stop reason: HOSPADM

## 2022-11-08 RX ORDER — ONDANSETRON 4 MG/1
4 TABLET, ORALLY DISINTEGRATING ORAL EVERY 8 HOURS PRN
Status: DISCONTINUED | OUTPATIENT
Start: 2022-11-08 | End: 2022-11-17 | Stop reason: HOSPADM

## 2022-11-08 RX ORDER — ONDANSETRON 2 MG/ML
4 INJECTION INTRAMUSCULAR; INTRAVENOUS EVERY 6 HOURS PRN
Status: DISCONTINUED | OUTPATIENT
Start: 2022-11-08 | End: 2022-11-17 | Stop reason: HOSPADM

## 2022-11-08 RX ORDER — ATORVASTATIN CALCIUM 40 MG/1
40 TABLET, FILM COATED ORAL DAILY
Status: DISCONTINUED | OUTPATIENT
Start: 2022-11-08 | End: 2022-11-17 | Stop reason: HOSPADM

## 2022-11-08 RX ORDER — MONTELUKAST SODIUM 10 MG/1
10 TABLET ORAL NIGHTLY
Status: DISCONTINUED | OUTPATIENT
Start: 2022-11-08 | End: 2022-11-17 | Stop reason: HOSPADM

## 2022-11-08 RX ORDER — 0.9 % SODIUM CHLORIDE 0.9 %
80 INTRAVENOUS SOLUTION INTRAVENOUS ONCE
Status: COMPLETED | OUTPATIENT
Start: 2022-11-08 | End: 2022-11-08

## 2022-11-08 RX ORDER — ACETAMINOPHEN 325 MG/1
650 TABLET ORAL EVERY 6 HOURS PRN
Status: DISCONTINUED | OUTPATIENT
Start: 2022-11-08 | End: 2022-11-17 | Stop reason: HOSPADM

## 2022-11-08 RX ORDER — DEXTROMETHORPHAN POLISTIREX 30 MG/5ML
60 SUSPENSION ORAL EVERY 12 HOURS SCHEDULED
Status: DISCONTINUED | OUTPATIENT
Start: 2022-11-08 | End: 2022-11-17 | Stop reason: HOSPADM

## 2022-11-08 RX ORDER — NICOTINE 21 MG/24HR
1 PATCH, TRANSDERMAL 24 HOURS TRANSDERMAL DAILY
Status: DISCONTINUED | OUTPATIENT
Start: 2022-11-08 | End: 2022-11-17 | Stop reason: HOSPADM

## 2022-11-08 RX ORDER — METHYLPREDNISOLONE SODIUM SUCCINATE 40 MG/ML
40 INJECTION, POWDER, LYOPHILIZED, FOR SOLUTION INTRAMUSCULAR; INTRAVENOUS EVERY 6 HOURS
Status: DISCONTINUED | OUTPATIENT
Start: 2022-11-08 | End: 2022-11-14

## 2022-11-08 RX ORDER — IPRATROPIUM BROMIDE AND ALBUTEROL SULFATE 2.5; .5 MG/3ML; MG/3ML
1 SOLUTION RESPIRATORY (INHALATION)
Status: DISCONTINUED | OUTPATIENT
Start: 2022-11-08 | End: 2022-11-10

## 2022-11-08 RX ORDER — SODIUM CHLORIDE 0.9 % (FLUSH) 0.9 %
5-40 SYRINGE (ML) INJECTION EVERY 12 HOURS SCHEDULED
Status: DISCONTINUED | OUTPATIENT
Start: 2022-11-08 | End: 2022-11-17 | Stop reason: HOSPADM

## 2022-11-08 RX ORDER — SODIUM CHLORIDE 0.9 % (FLUSH) 0.9 %
10 SYRINGE (ML) INJECTION PRN
Status: DISCONTINUED | OUTPATIENT
Start: 2022-11-08 | End: 2022-11-17 | Stop reason: HOSPADM

## 2022-11-08 RX ORDER — POLYETHYLENE GLYCOL 3350 17 G/17G
17 POWDER, FOR SOLUTION ORAL DAILY PRN
Status: DISCONTINUED | OUTPATIENT
Start: 2022-11-08 | End: 2022-11-17 | Stop reason: HOSPADM

## 2022-11-08 RX ORDER — ENOXAPARIN SODIUM 100 MG/ML
40 INJECTION SUBCUTANEOUS DAILY
Status: DISCONTINUED | OUTPATIENT
Start: 2022-11-08 | End: 2022-11-17 | Stop reason: HOSPADM

## 2022-11-08 RX ORDER — SODIUM CHLORIDE 9 MG/ML
25 INJECTION, SOLUTION INTRAVENOUS PRN
Status: DISCONTINUED | OUTPATIENT
Start: 2022-11-08 | End: 2022-11-17 | Stop reason: HOSPADM

## 2022-11-08 RX ORDER — BENZONATATE 200 MG/1
200 CAPSULE ORAL 3 TIMES DAILY
Status: DISCONTINUED | OUTPATIENT
Start: 2022-11-08 | End: 2022-11-17 | Stop reason: HOSPADM

## 2022-11-08 RX ORDER — ACETAMINOPHEN 650 MG/1
650 SUPPOSITORY RECTAL EVERY 6 HOURS PRN
Status: DISCONTINUED | OUTPATIENT
Start: 2022-11-08 | End: 2022-11-17 | Stop reason: HOSPADM

## 2022-11-08 RX ADMIN — IOPAMIDOL 75 ML: 755 INJECTION, SOLUTION INTRAVENOUS at 12:45

## 2022-11-08 RX ADMIN — SODIUM CHLORIDE, PRESERVATIVE FREE 10 ML: 5 INJECTION INTRAVENOUS at 20:40

## 2022-11-08 RX ADMIN — SODIUM CHLORIDE, PRESERVATIVE FREE 10 ML: 5 INJECTION INTRAVENOUS at 12:45

## 2022-11-08 RX ADMIN — Medication 60 MG: at 20:42

## 2022-11-08 RX ADMIN — LISINOPRIL 20 MG: 20 TABLET ORAL at 20:42

## 2022-11-08 RX ADMIN — AZITHROMYCIN DIHYDRATE 500 MG: 500 INJECTION, POWDER, LYOPHILIZED, FOR SOLUTION INTRAVENOUS at 17:05

## 2022-11-08 RX ADMIN — IPRATROPIUM BROMIDE AND ALBUTEROL SULFATE 1 AMPULE: .5; 2.5 SOLUTION RESPIRATORY (INHALATION) at 11:58

## 2022-11-08 RX ADMIN — METHYLPREDNISOLONE SODIUM SUCCINATE 40 MG: 40 INJECTION, POWDER, FOR SOLUTION INTRAMUSCULAR; INTRAVENOUS at 19:31

## 2022-11-08 RX ADMIN — ATORVASTATIN CALCIUM 40 MG: 40 TABLET, FILM COATED ORAL at 19:31

## 2022-11-08 RX ADMIN — MONTELUKAST 10 MG: 10 TABLET, FILM COATED ORAL at 20:42

## 2022-11-08 RX ADMIN — IPRATROPIUM BROMIDE AND ALBUTEROL SULFATE 1 AMPULE: .5; 2.5 SOLUTION RESPIRATORY (INHALATION) at 19:07

## 2022-11-08 RX ADMIN — BENZONATATE 200 MG: 200 CAPSULE ORAL at 19:32

## 2022-11-08 RX ADMIN — CEFTRIAXONE SODIUM 1000 MG: 1 INJECTION, POWDER, FOR SOLUTION INTRAMUSCULAR; INTRAVENOUS at 15:43

## 2022-11-08 RX ADMIN — ENOXAPARIN SODIUM 40 MG: 100 INJECTION SUBCUTANEOUS at 15:39

## 2022-11-08 RX ADMIN — METHYLPREDNISOLONE SODIUM SUCCINATE 125 MG: 125 INJECTION, POWDER, FOR SOLUTION INTRAMUSCULAR; INTRAVENOUS at 11:46

## 2022-11-08 RX ADMIN — SODIUM CHLORIDE 80 ML: 9 INJECTION, SOLUTION INTRAVENOUS at 12:46

## 2022-11-08 ASSESSMENT — ENCOUNTER SYMPTOMS
VOMITING: 0
CHEST TIGHTNESS: 0
TROUBLE SWALLOWING: 0
PHOTOPHOBIA: 0
COLOR CHANGE: 0
EYE PAIN: 0
NAUSEA: 0
VOICE CHANGE: 0
FACIAL SWELLING: 0
SHORTNESS OF BREATH: 1
ABDOMINAL PAIN: 0
BACK PAIN: 0

## 2022-11-08 ASSESSMENT — PAIN SCALES - GENERAL: PAINLEVEL_OUTOF10: 0

## 2022-11-08 NOTE — CONSULTS
[facility]   CONSULT NOTE:      DATE OF CONSULT 11/8/2022    REASON FOR CONSULTATION:  Critical care management, hypoxic respiratory failure      PCP Azeb Johnson, DTR     CHIEF COMPLAINT: Dyspnea hypoxemia    HISTORY OF PRESENT ILLNESS:     Dalia Rubin is a 42-year-old white female who I first became acquainted with in 2011 when she presented with acute hypercapnic respiratory failure required intubation and was prolonged on prolonged mechanical ventilation. She eventually did get extubated without a trach. However, never followed up. In 2014 she was again seen by me with an acute exacerbation of COPD and acute on chronic hypoxic and hypercapnic respiratory failure. She was maintained on BiPAP. She once again was discharged. During that admission, she became extremely agitated and had to be placed on both Geodon and Precedex drip. In 2019, it appears that she was at [facility] for pneumonia with multifocal infiltrates. She once again had pneumonia with acute hypoxic hypercapnic and respiratory failure. She was eventually discharged but never followed up with any pulmonologist    She states that she is not on oxygen at home. She has inhalers which she says uses \"from time to time\" she became short of breath about 3 to 4 days ago. She says she has a cough productive of greenish phlegm. When she came into the emergency room, she was hypoxic and had to be placed additionally on BiPAP but kept on ripping off the mask and so she was placed on high flow nasal cannula. She is on 30 L high flow with 50% FiO2 and she is saturating around 92%. CT of the chest was negative for pulmonary embolism but showed multiple reticular nodule infiltrates primarily in the upper lobes. Right greater than left. There was a question of possible Mycobacterium infection and should have been placed in droplet plus isolation.     She states that her boyfriend's brother was recently released from intermediate and had some sort of \"respiratory infection\". She is a heavy smoker, started smoking at age 11, 2 packs/day for about 30 years. Recently, she is \"cut down\" to 1 pack a day. She denies any illicit drug use. She denies any alcohol use      ALLERGIES:  No Known Allergies    HOME MEDICATIONS:  Not in a hospital admission. PAST MEDICAL HISTORY:  Past Medical History:   Diagnosis Date    Acute respiratory failure (HCC)     due to COPD exacerbation    COPD (chronic obstructive pulmonary disease) (HCC)     with hypoxia    Cough     Current every day smoker     Dyspnea     Heart murmur     as a child    Hypertension     Shortness of breath        PAST SURGICAL HISTORY:  Past Surgical History:   Procedure Laterality Date     SECTION            SOCIAL HISTORY:  Social History     Socioeconomic History    Marital status: Legally      Spouse name: Not on file    Number of children: Not on file    Years of education: Not on file    Highest education level: Not on file   Occupational History    Not on file   Tobacco Use    Smoking status: Every Day     Packs/day: 0.50     Types: Cigarettes    Smokeless tobacco: Never   Vaping Use    Vaping Use: Some days   Substance and Sexual Activity    Alcohol use: No    Drug use: No    Sexual activity: Not on file   Other Topics Concern    Not on file   Social History Narrative    Not on file     Social Determinants of Health     Financial Resource Strain: Not on file   Food Insecurity: Not on file   Transportation Needs: Not on file   Physical Activity: Not on file   Stress: Not on file   Social Connections: Not on file   Intimate Partner Violence: Not on file   Housing Stability: Not on file       FAMILY HISTORY:  History reviewed. No pertinent family history. REVIEW OF SYSTEMS:  All other systems reviewed and are negative. PHYSICAL EXAM:  Vital Signs Blood pressure (!) 126/92, pulse 97, temperature 98.9 °F (37.2 °C), resp.  rate 30, height 4' 11\" (1.499 m), weight 162 lb (73.5 kg), SpO2 91 %. Oxygen Amount and Delivery: O2 Flow Rate (L/min): 30 L/min    Admission Weight Weight: 162 lb (73.5 kg)    General Appearance   Middle-aged female unkempt looking older than her stated age in some moderate respiratory distress  Head  Normocephalic, without obvious abnormality, atraumatic    Eyes  conjunctivae/corneas clear. PERRL, EOM's intact. ENT oral cavity is clear, dentition is poor  Neck  no adenopathy, no carotid bruit, no JVD, supple, symmetrical, trachea midline and thyroid not enlarged, symmetric, no tenderness/mass/nodules  Lungs tattered wheezes with harsh bronchial breath sound  Heart: regular rate and rhythm, S1, S2 normal, no murmur, click, rub or gallop  Abdomen  soft, non-tender; bowel sounds normal; no masses,  no organomegaly  Extremities  No edema  Skin  Skin color, texture, turgor normal. No rashes or lesions  Neurologic: Alert and oriented X 3, normal strength and tone. Imaging  CT of chest shows multiple tiny reticular nodular infiltrates bilaterally. No mass, also evidence of bronchiectasis with underlying emphysema.   No pulmonary embolism      Lab Review  CBC     Lab Results   Component Value Date/Time    WBC 18.4 11/08/2022 11:32 AM    RBC 5.33 11/08/2022 11:32 AM    HGB 15.9 11/08/2022 11:32 AM    HCT 49.1 11/08/2022 11:32 AM     11/08/2022 11:32 AM    MCV 92.2 11/08/2022 11:32 AM    MCH 29.9 11/08/2022 11:32 AM    MCHC 32.4 11/08/2022 11:32 AM    RDW 13.9 11/08/2022 11:32 AM    METASPCT 2 11/08/2022 11:32 AM    LYMPHOPCT 13 11/08/2022 11:32 AM    MONOPCT 7 11/08/2022 11:32 AM    MYELOPCT 1 12/26/2014 06:39 AM    BASOPCT 0 11/08/2022 11:32 AM    MONOSABS 1.29 11/08/2022 11:32 AM    LYMPHSABS 2.39 11/08/2022 11:32 AM    EOSABS 0.00 11/08/2022 11:32 AM    BASOSABS 0.00 11/08/2022 11:32 AM    DIFFTYPE NOT REPORTED 11/01/2019 05:24 AM       BMP   Lab Results   Component Value Date/Time     11/08/2022 11:32 AM    K 4.5 11/08/2022 11:32 AM    CL 87 11/08/2022 11:32 AM    CO2 30 11/08/2022 11:32 AM    BUN 15 11/08/2022 11:32 AM    CREATININE 0.71 11/08/2022 11:32 AM    GLUCOSE 158 11/08/2022 11:32 AM    CALCIUM 10.1 11/08/2022 11:32 AM       LFTS  Lab Results   Component Value Date/Time    ALKPHOS 44 11/01/2019 05:24 AM    ALT 12 11/01/2019 05:24 AM    AST 11 11/01/2019 05:24 AM    PROT 6.1 11/01/2019 05:24 AM    BILITOT 0.27 11/01/2019 05:24 AM    LABALBU 3.0 11/01/2019 05:24 AM       INR  Recent Labs     11/08/22  1132   PROTIME 13.1   INR 1.0       APTT  Recent Labs     11/08/22  1132   APTT 30.0       Lactic Acid  No results found for: LACTA     PRO-BNP   No results for input(s): PROBNP in the last 72 hours. ABGs:   Lab Results   Component Value Date/Time    PHART 7.366 11/08/2022 11:30 AM    PO2ART 63.4 11/08/2022 11:30 AM    VYD0HKD 58.3 11/08/2022 11:30 AM       Lab Results   Component Value Date/Time    MODE HFNC 30L 50% 11/08/2022 11:30 AM         Impression    Acute hypoxic respiratory failure  Multifocal pneumonia-most likely atypical  Acute exacerbation COPD  Chronic hypercapnic respiratory failure  History of tobacco abuse  Medical noncompliance        Plan:      Intermediate ICU admission  Oxygen keep saturations greater than 88%  Does not really need BiPAP, in the past, she has struggled using it despite being on Precedex and Geodon and currently she is not in acute hypercapnic respiratory failure  If she worsens, may require intubation  Continue Rocephin and Zithromax  Check urine for legionella antigen, mycoplasma IgM, HIV and TB quantiferron  I do not feel that she has tuberculosis, it would be important to find out what her boyfriend's brother contracted while at present  DuoNebs every 4 hours  IV Solu-Medrol 40 every 6 hours  Nicotine patch  DVT prophylaxis  She has extremely poor insight into her underlying disease and continues to be noncompliant.   I do not think there is much we can do for her in terms of outpatient management if she does not follow-up.   If she does follow-up, when she is in a stable state we will check alpha-1 antitrypsin phenotype

## 2022-11-08 NOTE — PROGRESS NOTES
Medication History completed:    New medications: Flonase    Medications discontinued: Advair    Changes to dosing: none    Stated allergies: NKDA    Other pertinent information: Medications confirmed with [facility]. The patient has not filled Advair in over a year.      Thank you,  Eliseo Dasilva, PharmD, BCPS  [phone]

## 2022-11-08 NOTE — ED TRIAGE NOTES
Mode of arrival (squad #, walk in, police, etc) : walk in        Chief complaint(s): shortness of breath         Arrival Note (brief scenario, treatment PTA, etc). : states she does not feel well. Woke up with dizziness, shortness of breath with a ongoing cough        C= \"Have you ever felt that you should Cut down on your drinking? \"  No  A= \"Have people Annoyed you by criticizing your drinking? \"  No  G= \"Have you ever felt bad or Guilty about your drinking? \"  No  E= \"Have you ever had a drink as an Eye-opener first thing in the morning to steady your nerves or to help a hangover? \"  No      Deferred []      Reason for deferring: N/A    *If yes to two or more: probable alcohol abuse. *

## 2022-11-08 NOTE — ED NOTES
Pt placed on nonrebreather @15L when brought back to room     [facility], RN  11/08/22 4529

## 2022-11-08 NOTE — ED PROVIDER NOTES
Pneumonia J18.9    Acute exacerbation of chronic obstructive pulmonary disease (COPD) ([facility]) J44.1    Tobacco abuse Z72.0    Chronic respiratory failure ([facility]) J96.10    COPD exacerbation ([facility]) J44.1    Hyponatremia E87.1    Influenza J11.1    Chronic obstructive pulmonary disease with hypoxia ([facility]) J44.9, R09.02    Acute respiratory failure ([facility]) J96.00     SURGICAL HISTORY       Past Surgical History:   Procedure Laterality Date     SECTION       CURRENT MEDICATIONS       Previous Medications    ALBUTEROL (PROVENTIL) (2.5 MG/3ML) 0.083% NEBULIZER SOLUTION    Take 2.5 mg by nebulization every 6 hours as needed for Wheezing. ALBUTEROL SULFATE  (90 BASE) MCG/ACT INHALER    Inhale 2 puffs into the lungs every 6 hours as needed for Wheezing    ATORVASTATIN (LIPITOR) 40 MG TABLET    Take 1 tablet by mouth daily    FLUTICASONE (FLONASE) 50 MCG/ACT NASAL SPRAY    1 spray by Each Nostril route daily    LISINOPRIL (PRINIVIL;ZESTRIL) 20 MG TABLET    Take 1 tablet by mouth 2 times daily    LORATADINE (CLARITIN) 10 MG TABLET    Take 10 mg by mouth daily    MONTELUKAST (SINGULAIR) 10 MG TABLET    Take 10 mg by mouth nightly    MULTIPLE VITAMIN (MULTIVITAMIN) TABLET    Take 1 tablet by mouth daily    TIOTROPIUM (SPIRIVA) 18 MCG INHALATION CAPSULE    Inhale 18 mcg into the lungs daily. ALLERGIES     has No Known Allergies. FAMILY HISTORY     has no family status information on file. SOCIAL HISTORY       Social History     Tobacco Use    Smoking status: Every Day     Packs/day: 0.50     Types: Cigarettes    Smokeless tobacco: Never   Vaping Use    Vaping Use: Some days   Substance Use Topics    Alcohol use: No    Drug use: No     PHYSICAL EXAM     INITIAL VITALS: BP (!) 126/92   Pulse 97   Temp 98.9 °F (37.2 °C)   Resp 30   Ht 4' 11\" (1.499 m)   Wt 162 lb (73.5 kg)   SpO2 91%   BMI 32.72 kg/m²    Physical Exam  Vitals reviewed. Constitutional:       General: She is in acute distress. Appearance: Normal appearance. She is not ill-appearing or toxic-appearing. HENT:      Head: Normocephalic and atraumatic. Right Ear: External ear normal.      Left Ear: External ear normal.      Nose: No congestion or rhinorrhea. Eyes:      Extraocular Movements: Extraocular movements intact. Pupils: Pupils are equal, round, and reactive to light. Cardiovascular:      Rate and Rhythm: Normal rate and regular rhythm. Pulses: Normal pulses. Heart sounds: Normal heart sounds. Pulmonary:      Effort: Accessory muscle usage and respiratory distress present. Breath sounds: Wheezing present. Abdominal:      General: Bowel sounds are normal. There is no distension. Palpations: Abdomen is soft. Tenderness: There is no abdominal tenderness. Musculoskeletal:         General: No deformity or signs of injury. Normal range of motion. Cervical back: No rigidity or tenderness. Skin:     General: Skin is warm and dry. Neurological:      Mental Status: She is alert and oriented to person, place, and time. Mental status is at baseline. Psychiatric:         Mood and Affect: Mood normal.         Behavior: Behavior normal.       MEDICAL DECISION MAKING:   Patient with respiratory distress. Cardiac work-up in the ER did not display positive signs of acute cardiac ischemia. EKG was reviewed and interpreted by myself, ED physician. Patient with an underlying history of COPD. Patient provided with breathing treatment as well as steroid. Patient's symptoms initially were acute respiratory distress and the patient was placed on a nonrebreather. The patient was still satting in the 80s on pulse ox. An attempt was made to start the patient on BiPAP but the patient could not tolerate the BiPAP. The patient was therefore transitioned into high flow nasal cannula. The patient is tolerating that better but is still short of breath.   CT chest pulmonary embolus does display signs of atypical pneumonia. It does also display signs of mycobacterial infection. The patient was placed in isolation and IV antibiotics were started. The admitting team was made aware of the abnormal finding on CT and the patient will likely be assessed by an infectious disease physician. A consult to pulmonary medicine was placed and agreed with management. The patient was admitted after speaking with the admitting team.  Patient understands and agrees with the plan. A full discussion was had with the patient and family regarding CODE STATUS. Initially the patient refused any intubation in the event that the patient needed it. However after speaking to the patient again later on in the visit, the patient did agree to being full code. This was the patient's choosing after speaking with her daughter. CRITICAL CARE:   35 minutes. This is separate from any procedures that were billed for. PROCEDURES:    Procedures    DIAGNOSTIC RESULTS   EKG:All EKG's are interpreted by the Emergency Department Physician who either signs or Co-signs this chart in the absence of a cardiologist.        RADIOLOGY:All plain film, CT, MRI, and formal ultrasound images (except ED bedside ultrasound) are read by the radiologist, see reports below, unless otherwisenoted in MDM or here. CT CHEST PULMONARY EMBOLISM W CONTRAST   Final Result   1. No clear evidence for central pulmonary embolus within the limitations of   this study. 2. Multifocal tree-in-bud and nodular opacities throughout both lungs as   detailed above likely representing sequela of recurrent aspiration or   atypical mycobacterial infection. Follow-up is recommended to document   resolution. 3. Mild emphysema. Respiratory motion. Bronchiectasis and scarring along   the medial right upper lobe. 4. Atheromatous plaque and atherosclerotic calcification of the aorta. Coronary artery disease. 5. Bilateral hilar lymph node enlargement, likely reactive. 6. Fatty liver. XR CHEST PORTABLE   Final Result   Increased interstitial opacity. While much or all of this may be chronic,   please correlate with any clinical evidence of acute interstitial edema. LABS: All lab results were reviewed by myself, and all abnormals are listed below.   Labs Reviewed   TROPONIN - Abnormal; Notable for the following components:       Result Value    Troponin, High Sensitivity 56 (*)     All other components within normal limits   TROPONIN - Abnormal; Notable for the following components:    Troponin, High Sensitivity 50 (*)     All other components within normal limits   BASIC METABOLIC PANEL - Abnormal; Notable for the following components:    Glucose 158 (*)     Sodium 130 (*)     Chloride 87 (*)     All other components within normal limits   CBC WITH AUTO DIFFERENTIAL - Abnormal; Notable for the following components:    WBC 18.4 (*)     RBC 5.33 (*)     Hematocrit 49.1 (*)     Platelets 174 (*)     Lymphocytes 13 (*)     Bands 14 (*)     Metamyelocytes 2 (*)     Segs Absolute 11.77 (*)     Absolute Bands # 2.58 (*)     Metamyelocytes Absolute 0.37 (*)     All other components within normal limits   BLOOD GAS, ARTERIAL - Abnormal; Notable for the following components:    pCO2, Arterial 58.3 (*)     pO2, Arterial 63.4 (*)     HCO3, Arterial 33.3 (*)     Positive Base Excess, Art 8.0 (*)     O2 Sat, Arterial 87.7 (*)     All other components within normal limits   BLOOD GAS, ARTERIAL - Abnormal; Notable for the following components:    pCO2, Arterial 61.8 (*)     pO2, Arterial 58.0 (*)     HCO3, Arterial 34.7 (*)     Positive Base Excess, Art 9.2 (*)     O2 Sat, Arterial 86.7 (*)     All other components within normal limits   COVID-19 & INFLUENZA COMBO   QUANTIFERON (R) TB GOLD   CULTURE, RESPIRATORY   RESPIRATORY PANEL, MOLECULAR, WITH COVID-19   LEGIONELLA ANTIGEN, URINE   APTT   PROTIME-INR   PHOSPHORUS   MAGNESIUM   MYCOPLASMA PNEUMONIAE ANTIBODY, IGM   HIV SCREEN       EMERGENCY DEPARTMENTCOURSE:         Vitals:    Vitals:    11/08/22 1449 11/08/22 1459 11/08/22 1531 11/08/22 1535   BP: 127/75 (!) 128/96 (!) 126/92    Pulse: 99 97 98 97   Resp: 29 28 22 30   Temp:       SpO2: 93% 93% (!) 89% 91%   Weight:       Height:           The patient was given the following medications while in the emergency department:  Orders Placed This Encounter   Medications    ipratropium-albuterol (DUONEB) nebulizer solution 1 ampule     Order Specific Question:   Initiate RT Bronchodilator Protocol     Answer:   Yes - ED protocol    methylPREDNISolone sodium (SOLU-MEDROL) injection 125 mg    iopamidol (ISOVUE-370) 76 % injection 75 mL    0.9 % sodium chloride bolus    sodium chloride flush 0.9 % injection 10 mL    cefTRIAXone (ROCEPHIN) 1,000 mg in sodium chloride 0.9 % 50 mL IVPB mini-bag     Order Specific Question:   Antimicrobial Indications     Answer:   Pneumonia (CAP)    azithromycin (ZITHROMAX) 500 mg in D5W 250ml addavial     Order Specific Question:   Antimicrobial Indications     Answer:   Pneumonia (CAP)    sodium chloride flush 0.9 % injection 5-40 mL    sodium chloride flush 0.9 % injection 5-40 mL    0.9 % sodium chloride infusion    enoxaparin (LOVENOX) injection 40 mg     Order Specific Question:   Indication of Use     Answer:   Prophylaxis-DVT/PE    OR Linked Order Group     ondansetron (ZOFRAN-ODT) disintegrating tablet 4 mg     ondansetron (ZOFRAN) injection 4 mg    polyethylene glycol (GLYCOLAX) packet 17 g    OR Linked Order Group     acetaminophen (TYLENOL) tablet 650 mg     acetaminophen (TYLENOL) suppository 650 mg    atorvastatin (LIPITOR) tablet 40 mg    lisinopril (PRINIVIL;ZESTRIL) tablet 20 mg    montelukast (SINGULAIR) tablet 10 mg    DISCONTD: methylPREDNISolone sodium (SOLU-MEDROL) injection 40 mg    benzonatate (TESSALON) capsule 200 mg    dextromethorphan (DELSYM) 30 MG/5ML extended release liquid 60 mg    cefTRIAXone (ROCEPHIN) 1,000 mg in sodium chloride 0.9 % 50 mL IVPB mini-bag     Order Specific Question:   Antimicrobial Indications     Answer:   Pneumonia (CAP)     Order Specific Question:   CAP duration of therapy     Answer:   7 days    azithromycin (ZITHROMAX) 500 mg in D5W 250ml addavial     Order Specific Question:   Antimicrobial Indications     Answer:   Pneumonia (CAP)     Order Specific Question:   CAP duration of therapy     Answer:   5 days    nicotine (NICODERM CQ) 21 MG/24HR 1 patch    methylPREDNISolone sodium (SOLU-MEDROL) injection 40 mg     CONSULTS:  IP CONSULT TO INTERNAL MEDICINE  IP CONSULT TO PULMONOLOGY  IP CONSULT TO INFECTIOUS DISEASES    FINAL IMPRESSION      1. COPD exacerbation ([facility] [address]    2. Acute respiratory failure with hypoxia (HCC)    3. Pneumonia of both lungs due to infectious organism, unspecified part of lung          DISPOSITION/PLAN   DISPOSITION Admitted 11/08/2022 03:15:05 PM      PATIENT REFERRED TO:  No follow-up provider specified. DISCHARGE MEDICATIONS:  New Prescriptions    No medications on file     The care is provided during an unprecedented national emergency due to the novel coronavirus, COVID 19.   Rubens Guan, DO Rubens Guan,   11/08/22 [address] [place] [address]  11/08/22 584

## 2022-11-08 NOTE — ED NOTES
Pt desating to 88% on high flow oxygen. Pt denies use of bipap. Per Dr. Maribeth Mendez he is okay with pulse ox of 88% anything lower must notify.      Lara [facility]  11/08/22 [id number]

## 2022-11-08 NOTE — ED NOTES
Pt refusing bipap at this time Dr. Melvina Terry made aware. RN, respiratory therapist and Dr. Melvina Terry informed patient of risks of not using bipap. Pt alert and oriented x4 and states she understands risks and still does not want the bipap.      LYUDMILA Bermudez  11/08/22 [address] [facility]  11/08/22 0703

## 2022-11-09 LAB
ANION GAP SERPL CALCULATED.3IONS-SCNC: 10 MMOL/L (ref 9–17)
BUN BLDV-MCNC: 14 MG/DL (ref 6–20)
CALCIUM SERPL-MCNC: 9.4 MG/DL (ref 8.6–10.4)
CHLORIDE BLD-SCNC: 88 MMOL/L (ref 98–107)
CO2: 31 MMOL/L (ref 20–31)
CREAT SERPL-MCNC: 0.5 MG/DL (ref 0.5–0.9)
GFR SERPL CREATININE-BSD FRML MDRD: >60 ML/MIN/1.73M2
GLUCOSE BLD-MCNC: 135 MG/DL (ref 70–99)
HCT VFR BLD CALC: 46.5 % (ref 36–46)
HEMOGLOBIN: 15.2 G/DL (ref 12–16)
HIV AG/AB: NONREACTIVE
MCH RBC QN AUTO: 30.7 PG (ref 26–34)
MCHC RBC AUTO-ENTMCNC: 32.7 G/DL (ref 31–37)
MCV RBC AUTO: 93.6 FL (ref 80–100)
MYCOPLASMA PNEUMONIAE IGM: 0.66
PDW BLD-RTO: 14 % (ref 11.5–14.9)
PLATELET # BLD: 468 K/UL (ref 150–450)
PMV BLD AUTO: 8.1 FL (ref 6–12)
POTASSIUM SERPL-SCNC: 4.8 MMOL/L (ref 3.7–5.3)
RBC # BLD: 4.96 M/UL (ref 4–5.2)
SODIUM BLD-SCNC: 129 MMOL/L (ref 135–144)
WBC # BLD: 21 K/UL (ref 3.5–11)

## 2022-11-09 PROCEDURE — 97535 SELF CARE MNGMENT TRAINING: CPT

## 2022-11-09 PROCEDURE — 6370000000 HC RX 637 (ALT 250 FOR IP): Performed by: INTERNAL MEDICINE

## 2022-11-09 PROCEDURE — 6370000000 HC RX 637 (ALT 250 FOR IP): Performed by: EMERGENCY MEDICINE

## 2022-11-09 PROCEDURE — 2580000003 HC RX 258: Performed by: INTERNAL MEDICINE

## 2022-11-09 PROCEDURE — 2060000000 HC ICU INTERMEDIATE R&B

## 2022-11-09 PROCEDURE — 80048 BASIC METABOLIC PNL TOTAL CA: CPT

## 2022-11-09 PROCEDURE — 97166 OT EVAL MOD COMPLEX 45 MIN: CPT

## 2022-11-09 PROCEDURE — 2580000003 HC RX 258: Performed by: STUDENT IN AN ORGANIZED HEALTH CARE EDUCATION/TRAINING PROGRAM

## 2022-11-09 PROCEDURE — 97530 THERAPEUTIC ACTIVITIES: CPT

## 2022-11-09 PROCEDURE — 6360000002 HC RX W HCPCS: Performed by: INTERNAL MEDICINE

## 2022-11-09 PROCEDURE — 94640 AIRWAY INHALATION TREATMENT: CPT

## 2022-11-09 PROCEDURE — 99291 CRITICAL CARE FIRST HOUR: CPT | Performed by: INTERNAL MEDICINE

## 2022-11-09 PROCEDURE — 6370000000 HC RX 637 (ALT 250 FOR IP): Performed by: STUDENT IN AN ORGANIZED HEALTH CARE EDUCATION/TRAINING PROGRAM

## 2022-11-09 PROCEDURE — 94761 N-INVAS EAR/PLS OXIMETRY MLT: CPT

## 2022-11-09 PROCEDURE — 97162 PT EVAL MOD COMPLEX 30 MIN: CPT

## 2022-11-09 PROCEDURE — 87899 AGENT NOS ASSAY W/OPTIC: CPT

## 2022-11-09 PROCEDURE — 2700000000 HC OXYGEN THERAPY PER DAY

## 2022-11-09 PROCEDURE — 99255 IP/OBS CONSLTJ NEW/EST HI 80: CPT | Performed by: INTERNAL MEDICINE

## 2022-11-09 PROCEDURE — 85027 COMPLETE CBC AUTOMATED: CPT

## 2022-11-09 PROCEDURE — 87449 NOS EACH ORGANISM AG IA: CPT

## 2022-11-09 PROCEDURE — 36415 COLL VENOUS BLD VENIPUNCTURE: CPT

## 2022-11-09 RX ORDER — HYDRALAZINE HYDROCHLORIDE 20 MG/ML
5 INJECTION INTRAMUSCULAR; INTRAVENOUS EVERY 6 HOURS PRN
Status: DISCONTINUED | OUTPATIENT
Start: 2022-11-09 | End: 2022-11-11

## 2022-11-09 RX ORDER — MIDODRINE HYDROCHLORIDE 5 MG/1
5 TABLET ORAL 2 TIMES DAILY PRN
Status: DISCONTINUED | OUTPATIENT
Start: 2022-11-09 | End: 2022-11-13

## 2022-11-09 RX ORDER — 0.9 % SODIUM CHLORIDE 0.9 %
1000 INTRAVENOUS SOLUTION INTRAVENOUS ONCE
Status: COMPLETED | OUTPATIENT
Start: 2022-11-09 | End: 2022-11-09

## 2022-11-09 RX ADMIN — IPRATROPIUM BROMIDE AND ALBUTEROL SULFATE 1 AMPULE: .5; 2.5 SOLUTION RESPIRATORY (INHALATION) at 19:26

## 2022-11-09 RX ADMIN — Medication 60 MG: at 09:23

## 2022-11-09 RX ADMIN — MIDODRINE HYDROCHLORIDE 5 MG: 5 TABLET ORAL at 16:45

## 2022-11-09 RX ADMIN — METHYLPREDNISOLONE SODIUM SUCCINATE 40 MG: 40 INJECTION, POWDER, FOR SOLUTION INTRAMUSCULAR; INTRAVENOUS at 20:36

## 2022-11-09 RX ADMIN — IPRATROPIUM BROMIDE AND ALBUTEROL SULFATE 1 AMPULE: .5; 2.5 SOLUTION RESPIRATORY (INHALATION) at 09:03

## 2022-11-09 RX ADMIN — METHYLPREDNISOLONE SODIUM SUCCINATE 40 MG: 40 INJECTION, POWDER, FOR SOLUTION INTRAMUSCULAR; INTRAVENOUS at 09:37

## 2022-11-09 RX ADMIN — BENZONATATE 200 MG: 200 CAPSULE ORAL at 20:37

## 2022-11-09 RX ADMIN — BENZONATATE 200 MG: 200 CAPSULE ORAL at 09:23

## 2022-11-09 RX ADMIN — AZITHROMYCIN DIHYDRATE 500 MG: 500 INJECTION, POWDER, LYOPHILIZED, FOR SOLUTION INTRAVENOUS at 10:15

## 2022-11-09 RX ADMIN — ATORVASTATIN CALCIUM 40 MG: 40 TABLET, FILM COATED ORAL at 09:23

## 2022-11-09 RX ADMIN — IPRATROPIUM BROMIDE AND ALBUTEROL SULFATE 1 AMPULE: .5; 2.5 SOLUTION RESPIRATORY (INHALATION) at 15:05

## 2022-11-09 RX ADMIN — LISINOPRIL 20 MG: 20 TABLET ORAL at 09:23

## 2022-11-09 RX ADMIN — Medication 60 MG: at 20:36

## 2022-11-09 RX ADMIN — METHYLPREDNISOLONE SODIUM SUCCINATE 40 MG: 40 INJECTION, POWDER, FOR SOLUTION INTRAMUSCULAR; INTRAVENOUS at 14:39

## 2022-11-09 RX ADMIN — METHYLPREDNISOLONE SODIUM SUCCINATE 40 MG: 40 INJECTION, POWDER, FOR SOLUTION INTRAMUSCULAR; INTRAVENOUS at 01:58

## 2022-11-09 RX ADMIN — BENZONATATE 200 MG: 200 CAPSULE ORAL at 14:37

## 2022-11-09 RX ADMIN — SODIUM CHLORIDE, PRESERVATIVE FREE 10 ML: 5 INJECTION INTRAVENOUS at 09:23

## 2022-11-09 RX ADMIN — ONDANSETRON 4 MG: 2 INJECTION INTRAMUSCULAR; INTRAVENOUS at 12:41

## 2022-11-09 RX ADMIN — ENOXAPARIN SODIUM 40 MG: 100 INJECTION SUBCUTANEOUS at 09:23

## 2022-11-09 RX ADMIN — CEFTRIAXONE SODIUM 1000 MG: 1 INJECTION, POWDER, FOR SOLUTION INTRAMUSCULAR; INTRAVENOUS at 09:36

## 2022-11-09 RX ADMIN — SODIUM CHLORIDE, PRESERVATIVE FREE 10 ML: 5 INJECTION INTRAVENOUS at 20:35

## 2022-11-09 RX ADMIN — SODIUM CHLORIDE 1000 ML: 9 INJECTION, SOLUTION INTRAVENOUS at 16:43

## 2022-11-09 RX ADMIN — MONTELUKAST 10 MG: 10 TABLET, FILM COATED ORAL at 20:36

## 2022-11-09 RX ADMIN — IPRATROPIUM BROMIDE AND ALBUTEROL SULFATE 1 AMPULE: .5; 2.5 SOLUTION RESPIRATORY (INHALATION) at 11:32

## 2022-11-09 ASSESSMENT — PAIN SCALES - GENERAL
PAINLEVEL_OUTOF10: 0

## 2022-11-09 ASSESSMENT — ENCOUNTER SYMPTOMS
ABDOMINAL PAIN: 0
WHEEZING: 0
NAUSEA: 0
RHINORRHEA: 0
BACK PAIN: 0
CHEST TIGHTNESS: 0
STRIDOR: 0
CONSTIPATION: 0
SHORTNESS OF BREATH: 1
DIARRHEA: 0
VOMITING: 0
COUGH: 1
ABDOMINAL DISTENTION: 0

## 2022-11-09 NOTE — PROGRESS NOTES
Patient seen and examined in ICU  Has advanced COPD, current smoker  Admitted worsening shortness of breath  Found to have acute hypoxic, hypercapnic respiratory failure  Patient could not tolerate BiPAP  Current on salter 8 L  She was up to 40 L of oxygen, gradually improving  CT chest concerning for tree-in-bud appearance, concern for possible Mycobacterium infection  ID, pulm following  QuantiFERON gold test ordered  Respiratory viral panel positive for adenovirus  On Lovenox for DVT prophylaxis  Will be shifted to intermediate ICU soon   Mild hyponatremia  Will monitor  Working with PT  Full dictation to follow  Patient critically sick, high chance of clinical deterioration  Seen in ICU  32 minutes CC time

## 2022-11-09 NOTE — PLAN OF CARE
Problem: Discharge Planning  Goal: Discharge to home or other facility with appropriate resources  11/9/2022 1654 by Edis Layne RN  Outcome: Progressing  Flowsheets  Taken 11/9/2022 1600  Discharge to home or other facility with appropriate resources: Refer to discharge planning if patient needs post-hospital services based on physician order or complex needs related to functional status, cognitive ability or social support system  Taken 11/9/2022 1230  Discharge to home or other facility with appropriate resources: Refer to discharge planning if patient needs post-hospital services based on physician order or complex needs related to functional status, cognitive ability or social support system  Taken 11/9/2022 0800  Discharge to home or other facility with appropriate resources: Refer to discharge planning if patient needs post-hospital services based on physician order or complex needs related to functional status, cognitive ability or social support system     Problem: Pain  Goal: Verbalizes/displays adequate comfort level or baseline comfort level  11/9/2022 1654 by Edis Layne RN  Outcome: Progressing  Flowsheets  Taken 11/9/2022 1600  Verbalizes/displays adequate comfort level or baseline comfort level: Encourage patient to monitor pain and request assistance  Taken 11/9/2022 1230  Verbalizes/displays adequate comfort level or baseline comfort level:   Encourage patient to monitor pain and request assistance   Assess pain using appropriate pain scale   Administer analgesics based on type and severity of pain and evaluate response  Taken 11/9/2022 0800  Verbalizes/displays adequate comfort level or baseline comfort level:   Encourage patient to monitor pain and request assistance   Assess pain using appropriate pain scale   Administer analgesics based on type and severity of pain and evaluate response

## 2022-11-09 NOTE — PROGRESS NOTES
Physical Therapy  Facility/Department: [facility] ICU  Physical Therapy Initial Assessment    Name: Cintia Turner  : 1965  MRN: [id number]  Date of Service: 2022    Discharge Recommendations:  Patient would benefit from continued therapy after discharge (pulmonary rehab)          Patient Diagnosis(es): The primary encounter diagnosis was COPD exacerbation ([address] Diagnoses of Acute respiratory failure with hypoxia (HCC) and Pneumonia of both lungs due to infectious organism, unspecified part of lung were also pertinent to this visit. Past Medical History:  has a past medical history of Acute respiratory failure ([address] COPD (chronic obstructive pulmonary disease) ([facility] [address] Cough, Current every day smoker, Dyspnea, Heart murmur, Hypertension, and Shortness of breath. Past Surgical History:  has a past surgical history that includes  section. Assessment   Assessment: pt presents with impaired endurance, balance, and strength impeding safety and independence with functional mobility. pt requires CGA for transfers and ambulation 2x10' moving at a very slow pace and requiring inc time for all mobility tasks. Pt demo's mild SpO2 desaturation with such tasks. PT services will benefit pt to progress towards Prior level of independence.   Treatment Diagnosis: Impaired mobility 2* endurance and strength deficits and acute respiratory failure  Specific Instructions for Next Treatment: progress ambulation and endurance as tolerated, BLE strengthening  Therapy Prognosis: Fair  Decision Making: Medium Complexity  Exam: MMT, ROM, Balance, and mobility assessments  Clinical Presentation: pt is alert, cooperative, and pleasant; very exhausted  Requires PT Follow-Up: Yes  Activity Tolerance  Activity Tolerance: Patient limited by fatigue;Patient limited by endurance;Treatment limited secondary to medical complications     Plan   Physcial Therapy Plan  General Plan: 3-5 times per week  Specific Instructions for Next Treatment: progress ambulation and endurance as tolerated, BLE strengthening  Current Treatment Recommendations: Strengthening, Balance training, Functional mobility training, Transfer training, Gait training, Stair training, Endurance training, Safety education & training, Equipment evaluation, education, & procurement, Therapeutic activities  Safety Devices  Type of Devices: Call light within reach, Gait belt, Patient at risk for falls, Left in bed, Nurse notified     Restrictions  Restrictions/Precautions  Restrictions/Precautions: Isolation, Fall Risk, Contact Precautions, Up as Tolerated, General Precautions (Contact and Droplet isolation - adenovirus pneumonia)  Required Braces or Orthoses?: No  Implants present? :  (Pt denies)     Subjective   Pain: Pt denies pain  General  Chart Reviewed: Yes  Patient assessed for rehabilitation services?: Yes  Additional Pertinent Hx: This patient is a 61 y.o.  Non- / non  femalewho presents with  Shortness of breath worsening over the last 2 weeks worse over the last 2 days  Underlying history of COPD has albuterol nebulizer at home  Denies chest pain denies fevers chills at home  Symptoms are severe continuous aggravated by exertion improved with oxygen and steroids     Patient desaturating to 80s on pulse ox BiPAP was tried but she could not tolerate it eventually placed on high flow nasal cannula oxygen  CT chest angiogram did not show PE but did show evidence of mycobacterial infection  Response To Previous Treatment: Not applicable  Family / Caregiver Present: No  Referring Practitioner: Anabell Field MD  Referral Date : 11/08/22  Diagnosis: Acute respiratory failure  Follows Commands: Within Functional Limits  General Comment  Comments: [facility] per Nurse Fritzi Frankel to proceed with therapy assessments  Subjective  Subjective: pt seated up in bed upon arrival and agreeable to therapy, pt preferred to sit EOB upon exit of therapy         Social/Functional History  Social/Functional History  Lives With: Significant other  Type of Home: House  Home Layout: One level  Home Access: Stairs to enter without rails  Entrance Stairs - Number of Steps: 1 MEHREEN  Bathroom Shower/Tub: Tub/Shower unit, Curtain  Bathroom Toilet: Standard  Home Equipment:  (No DME)  Has the patient had two or more falls in the past year or any fall with injury in the past year?: No  ADL Assistance: Independent  Homemaking Assistance: Independent  Homemaking Responsibilities: Yes  Ambulation Assistance: Independent  Transfer Assistance: Independent  Active : No  Patient's  Info: Boyfriend or daughter  Mode of Transportation: Car  IADL Comments: Pt reports sleeping in flat bed  Additional Comments: Pt reports that daughter lives close by and able to assist as needed. Daughter is not working. Pt reports that boyfriend works during the week and is home during the weekend  Vision/Hearing  Vision  Vision: Impaired  Vision Exceptions: Wears glasses at all times  Hearing  H[facility]: [facility]  Hearing Exceptions: Hard of hearing/hearing concerns; No hearing aid    Cognition   Orientation  Overall Orientation Status: Within Functional Limits  Cognition  Overall Cognitive Status: WNL     Objective   Heart Rate: (!) 102  Heart Rate Source: Monitor  BP: (!) 77/60  BP Location: Right upper arm  BP Method: Manual  Patient Position: Semi fowlers  MAP (Calculated): 66  Resp: 26  SpO2: (!) 86 %  O2 Device: High flow nasal cannula  Comment: Pt demonstrates mild SpO2 desaturation during light activity/ambulation to 88%; towards end of session pt becomes nauseated and retches/coughs resulting in more significant SpO2 Desat temporarily to high 70's with delayed rebound to 91% when retching/coughing ends.      Observation/Palpation  Observation: Peripheral IV R forearm, 8 L high flow        AROM RLE (degrees)  RLE AROM: WFL  AROM LLE (degrees)  LLE AROM : WFL  AROM RUE (degrees)  RUE General AROM: See OT  AROM LUE (degrees)  LUE General AROM: See OT  Strength RLE  Strength RLE: WFL  Comment: Grossly 4/5 except Hip/knee flexion 4-/5  Strength LLE  Strength LLE: WFL  Comment: Grossly 4/5 except Hip/knee flexion 4-/5  Strength RUE  Comment: See OT  Strength LUE  Comment: See OT     Sensation  Overall Sensation Status: WFL (Pt denies)     Bed mobility  Bridging: Unable to assess  Rolling to Left: Unable to assess  Rolling to Right: Unable to assess  Supine to Sit: Unable to assess  Sit to Supine: Unable to assess  Scooting: Stand by assistance  Bed Mobility Comments: Pt seated up in bed upon arrival and seated EOB upon exit  Transfers  Sit to Stand: Contact guard assistance  Stand to Sit: Contact guard assistance  Comment: Pt performs transfers without device; slow to complete transfers d/t fatigue  Ambulation  Surface: Level tile  Device: No Device  Assistance: Contact guard assistance  Quality of Gait: Slow and mildly unsteady; pt attributes this unsteadiness to fatigue. 1 LOB with CGA to recover  Gait Deviations: Slow Belinda; Increased KSENIA; Decreased step length;Decreased step height;Decreased head and trunk rotation;Decreased arm swing  Distance: 10'x2  Stairs/Curb  Stairs?: No (pt defers d/t fatigue)     Balance  Posture: Fair  Sitting - Static: Fair;+  Sitting - Dynamic: Fair  Standing - Static: Fair (no AD)  Standing - Dynamic: Fair;- (no AD)             AM-PAC Score  AM-PAC Inpatient Mobility Raw Score : 18 (11/09/22 1649)  AM-PAC Inpatient T-Scale Score : 43.63 (11/09/22 1649)  Mobility Inpatient CMS 0-100% Score: 46.58 (11/09/22 1649)  Mobility Inpatient CMS G-Code Modifier : CK (11/09/22 1649)        Goals  Short Term Goals  Time Frame for Short Term Goals: 8 visits  Short Term Goal 1: pt to demo independent bed mobility  Short Term Goal 2: pt to demo MOD I transfers with device as needed  Short Term Goal 3: pt to ambulate 50-75' with device as needed Mod I or independent  Short Term Goal 4: pt to improve standing balance to FAIR + with device as needed to decrease fall risk  Short Term Goal 5: pt to demo good technique for BLE strengthening exercises for HEP  Additional Goals?: Yes  Short Term Goal 6: pt to negotiate single step without rail and device as needed with SBA  Patient Goals   Patient Goals : To return home       Education  Patient Education  Education Given To: Patient  Education Provided: Role of Therapy;Plan of Care;Precautions; Fall Prevention Strategies  Education Method: Verbal  Barriers to Learning: Vision  Education Outcome: Verbalized understanding;Demonstrated understanding;Continued education needed      Therapy Time   Individual Concurrent Group Co-treatment   Time In 3432         Time Out 1110         Minutes 35         Timed Code Treatment Minutes: 10 Minutes       Ze Sumner, PT

## 2022-11-09 NOTE — PLAN OF CARE
Problem: Discharge Planning  Goal: Discharge to home or other facility with appropriate resources  Outcome: Progressing  Flowsheets (Taken 11/9/2022 0041)  Discharge to home or other facility with appropriate resources:   Identify barriers to discharge with patient and caregiver   Arrange for needed discharge resources and transportation as appropriate   Identify discharge learning needs (meds, wound care, etc)     Problem: Safety - Adult  Goal: Free from fall injury  Outcome: Progressing     Problem: Pain  Goal: Verbalizes/displays adequate comfort level or baseline comfort level  Outcome: Progressing     Problem: Respiratory - Adult  Goal: Achieves optimal ventilation and oxygenation  Outcome: Progressing  Flowsheets (Taken 11/9/2022 0000)  Achieves optimal ventilation and oxygenation:   Assess for changes in mentation and behavior   Position to facilitate oxygenation and minimize respiratory effort   Assess for changes in respiratory status     Problem: Cardiovascular - Adult  Goal: Maintains optimal cardiac output and hemodynamic stability  Outcome: Progressing  Goal: Absence of cardiac dysrhythmias or at baseline  Outcome: Progressing     Problem: Skin/Tissue Integrity - Adult  Goal: Skin integrity remains intact  Outcome: Progressing  Goal: Oral mucous membranes remain intact  Outcome: Progressing     Problem: Death & Dying  Goal: Pt/Family communicate acceptance of impending death and feel psychological comfort and peace  Outcome: Progressing  Flowsheets (Taken 11/9/2022 0000)  Patient/family communicates acceptance of loss or impending death and feels physical/psychological comfort and peace:   Assess patient/family anxiety and grief process related to end of life issues   Provide information about the patients health status with consideration of family and cultural values     Problem: Decision Making  Goal: Pt/Family able to effectively weigh alternatives and participate in decision making related to treatment and care  Outcome: Progressing  Flowsheets (Taken 11/9/2022 0000)  Patient/family able to effectively weigh alternatives and participate in decision making related to treatment and care:   Help patient and family identify pros/cons of alternative solutions   Provide information as requested by patient/family

## 2022-11-09 NOTE — PROGRESS NOTES
ICU Progress Note (Non-Vent)  [facility]    Patient - Mariano Royal,  Age - 61 y.o.    - 1965      Room Number -    N -  [id number]   [facility] - [de-identified]  Date of Admission -  2022 11:16 AM    Events of Past 24 Hours   She looks better today, still on high flow humidified oxygen    Vitals    height is 4' 11\" (1.499 m) and weight is 128 lb 15.5 oz (58.5 kg). Her oral temperature is 97.5 °F (36.4 °C). Her blood pressure is 119/86 and her pulse is 98. Her respiration is 29 and oxygen saturation is 96%.        Temperature Range: Temp: 97.5 °F (36.4 °C) Temp  Av.3 °F (36.8 °C)  Min: 97.5 °F (36.4 °C)  Max: 98.9 °F (37.2 °C)  BP Range:  Systolic (47RIA), JTO:247 , Min:84 , PE     Diastolic (49TET), WMH:43, Min:49, Max:136    Pulse Range: Pulse  Av.3  Min: 82  Max: 114  Respiration Range: Resp  Av.9  Min: 17  Max: 35  Current Pulse Ox[de-identified]  SpO2: 96 %  24HR Pulse Ox Range:  SpO2  Av.4 %  Min: 58 %  Max: 100 %  Oxygen Amount and Delivery: O2 Flow Rate (L/min): 40 L/min    Wt Readings from Last 3 Encounters:   22 128 lb 15.5 oz (58.5 kg)   19 162 lb 14.7 oz (73.9 kg)   18 150 lb (68 kg)     I/O   No intake or output data in the 24 hours ending 22 0849  DRAIN/TUBE OUTPUT       Invasive Lines   ICP PRESSURE RANGE  No data recorded  CVP PRESSURE RANGE  No data recorded      Medications      ipratropium-albuterol  1 ampule Inhalation Q4H WA    sodium chloride flush  5-40 mL IntraVENous 2 times per day    enoxaparin  40 mg SubCUTAneous Daily    atorvastatin  40 mg Oral Daily    lisinopril  20 mg Oral BID    montelukast  10 mg Oral Nightly    benzonatate  200 mg Oral TID    dextromethorphan  60 mg Oral 2 times per day    cefTRIAXone (ROCEPHIN) IV  1,000 mg IntraVENous Q24H    azithromycin  500 mg IntraVENous Q24H    nicotine  1 patch TransDERmal Daily methylPREDNISolone  40 mg IntraVENous Q6H     sodium chloride flush, sodium chloride flush, sodium chloride, ondansetron **OR** ondansetron, polyethylene glycol, acetaminophen **OR** acetaminophen  IV Drips/Infusions   sodium chloride         Diet/Nutrition   ADULT DIET; Regular    Exam      Constitutional - Alert, arousable  General Appearance chronically ill-appearing underdeveloped  HEENT -normocephalic, atraumatic. PERRLA  Lungs - Chest expands equally, extremely poor air movement  Cardiovascular - Heart sounds are normal.  normal rate and rhythm regular, no murmur, gallop or rub. Abdomen - soft, nontender, nondistended, no masses or organomegaly  Neurologic - CN II-XII are grossly intact.  There are no focal motor deficits  Skin - no bruising or bleeding  Extremities - no cyanosis, clubbing or edema    Lab Results   CBC     Lab Results   Component Value Date/Time    WBC 21.0 11/09/2022 04:15 AM    RBC 4.96 11/09/2022 04:15 AM    HGB 15.2 11/09/2022 04:15 AM    HCT 46.5 11/09/2022 04:15 AM     11/09/2022 04:15 AM    MCV 93.6 11/09/2022 04:15 AM    MCH 30.7 11/09/2022 04:15 AM    MCHC 32.7 11/09/2022 04:15 AM    RDW 14.0 11/09/2022 04:15 AM    METASPCT 2 11/08/2022 11:32 AM    LYMPHOPCT 13 11/08/2022 11:32 AM    MONOPCT 7 11/08/2022 11:32 AM    MYELOPCT 1 12/26/2014 06:39 AM    BASOPCT 0 11/08/2022 11:32 AM    MONOSABS 1.29 11/08/2022 11:32 AM    LYMPHSABS 2.39 11/08/2022 11:32 AM    EOSABS 0.00 11/08/2022 11:32 AM    BASOSABS 0.00 11/08/2022 11:32 AM    DIFFTYPE NOT REPORTED 11/01/2019 05:24 AM       BMP   Lab Results   Component Value Date/Time     11/09/2022 04:15 AM    K 4.8 11/09/2022 04:15 AM    CL 88 11/09/2022 04:15 AM    CO2 31 11/09/2022 04:15 AM    BUN 14 11/09/2022 04:15 AM    CREATININE 0.50 11/09/2022 04:15 AM    GLUCOSE 135 11/09/2022 04:15 AM       LFTS  Lab Results   Component Value Date/Time    ALKPHOS 44 11/01/2019 05:24 AM    ALT 12 11/01/2019 05:24 AM    AST 11 11/01/2019 05:24 AM    PROT 6.1 11/01/2019 05:24 AM    BILITOT 0.27 11/01/2019 05:24 AM    LABALBU 3.0 11/01/2019 05:24 AM       ABG ABGs:   Lab Results   Component Value Date/Time    PHART 7.358 11/08/2022 03:51 PM    PO2ART 58.0 11/08/2022 03:51 PM    XSK0JCS 61.8 11/08/2022 03:51 PM       Lab Results   Component Value Date/Time    MODE HFNC 30L 50% 11/08/2022 11:30 AM         INR  Recent Labs     11/08/22  1132   PROTIME 13.1   INR 1.0       APTT  Recent Labs     11/08/22  1132   APTT 30.0       Lactic Acid  No results found for: LACTA     BNP   No results for input(s): BNP in the last 72 hours.      Cultures       Radiology     CXR      CT Scans    (See actual reports for details)      SYSTEMS ASSESSMENT    Acute hypoxic respiratory failure  Multifocal pneumonia-most likely atypical/adenovirus  Acute exacerbation COPD  Hyponatremia-most likely SIADH from underlying pulmonary issues  Chronic hypercapnic respiratory failure  History of tobacco abuse  Medical noncompliance    Extremely poor understanding of her disease state and current situation  Wean off humidified high flow and switch over to salter device, keep saturations just greater than 88% since she is a CO2 retainer  Did not tolerate BiPAP  Can be switched to contact and droplet isolation given adenovirus findings  Does not have tuberculosis  Awaiting other serologies and culture  Continue DuoNeb aerosols  Continue antitussives  If we can get her to a salter device, can be ICU intermediate status      Critical Care Time   0 min    Electronically signed by Shane Burnette MD on 11/9/2022 at 8:49 AM

## 2022-11-09 NOTE — CARE COORDINATION
CASE MANAGEMENT NOTE:    Admission Date:  11/8/2022 Catherine Martinez is a 61 y.o.  female    Admitted for : Acute respiratory failure ([facility] [address] [J96.00]  COPD exacerbation ([facility] [address] [facility] [J44.1]  Acute respiratory failure with hypoxia ([facility] [address] [facility] [J96.01]  Pneumonia of both lungs due to infectious organism, unspecified part of lung [J18.9]    Met with:  Patient    PCP:  Emil Garrison                                Insurance:  [facility]      Is patient alert and oriented at time of discussion:  Yes    Current Residence/ Living Arrangements:  independently at home  w/ BF          Current Services PTA:  No    Does patient go to outpatient dialysis: No  If yes, location and chair time: NA  Who is their nephrologist? NA    Is patient agreeable to VNS: No    Freedom of choice provided:  No    List of [address] provided: No    VNS chosen:  No    DME:  none    Home Oxygen: No    Nebulizer: Yes, IS very old, would like a new one, will need DME orders, F2F notes    CPAP/BIPAP: No    Supplier: N/A    Potential Assistance Needed: Yes, New Neb    SNF needed: No    Freedom of choice and list provided: NA    Pharmacy:  [facility] on [address]      Is patient currently receiving oral anticoagulation therapy? No    Is the Patient an [facility] with Readmission Risk Score greater than 14%? No  If yes, pt needs a follow up appointment made within 7 days. Family Members/Caregivers that pt would like involved in their care:    Yes    If yes, list name here:  Daughter, Vahe Single    Transportation Provider:  Patient             Discharge Plan:  11/9/22 [address] Adenovirus. 6L[facility], Lives at Home W/BF, whose Brother was recently released from [place] & has Resp Infection. DME- Nebulizer, is very old, would like a new one. Will need DME & F2F notes. Denies VNS. IV Zithromax/Rocephin, Steroids, 40 Q6.  ID, Follow for Oxygen, PT/OT //KB               Electronically signed by: Juancho Ortiz RN on 11/9/2022 at 11:34 AM

## 2022-11-09 NOTE — PROGRESS NOTES
Patient being non- compliance with HFNC. Patient continue to pull off HFNC. Patient oxygen dropped to 70%. RT and RN at bedside. Patient placed on HFNC at 40L 75%. Writer explain to patient that she needs to keep HFNC on. Verify code status with intubation. Patient states she is a full code but she is not getting intubated. Bernie WATERMAN notified- Becka Garcia spoke to patient, Patient will remain a full code, if needed patient will get intubated. Dr. Rosemary Boykin notified via perfect serve of oxygen change with current status.

## 2022-11-09 NOTE — ED NOTES
Report given to Jeff Vail RN from [facility]. Report method in person   The following was reviewed with receiving RN:   Current vital signs:  /85   Pulse (!) 112   Temp 98.6 °F (37 °C) (Oral)   Resp (!) 34   Ht 4' 11\" (1.499 m)   Wt 162 lb (73.5 kg)   SpO2 92%   BMI 32.72 kg/m²                MEWS Score: 5     Any medication or safety alerts were reviewed. Any pending diagnostics and notifications were also reviewed, as well as any safety concerns or issues, abnormal labs, abnormal imaging, and abnormal assessment findings. Questions were answered.             Nikhil Stephens RN  11/08/22 9066

## 2022-11-09 NOTE — CONSULTS
[facility] -   Infectious diseases evaluation  admission date 11/8/2022    reason for consultation:   Atypical pneumonia    Impression :   Current:  Adenovirus respiratory infection with possible superimposed bacterial infection. Acute COPD exacerbation  Hyponatremia  Chronic hypercapnic respiratory failure  History of smoking    Recommendations   QuantiFERON-TB test pending  CT chest reviewed,discussed with Dr. Jet Posey, doubt Mycobacterium infection clinically. Continue ceftriaxone and Zithromax for now  QuantiFERON TB test pending  Mycoplasma IgM pending  Legionella urine antigen pending  Respiratory culture pending  HIV screen negative  Respiratory panel was positive for adenovirus PCR    Infection Control Recommendations   [place] Precautions  Droplet Isolation      Antimicrobial Stewardship Recommendations   Simplification of therapy  Targeted therapy      History of Present Illness:   Initial history:  Dino Donaldson is a 61y.o.-year-old female with history of smoking, COPD, chronic hypercapnic respiratory failure, noncompliant with home medication. She presented to hospital with worsening shortness of breath associated with cough productive of green phlegm for several days, symptoms moderate to severe, was placed initially on BiPAP that she could not tolerate then was placed on high flow per nasal cannula. CT chest was negative for pulmonary embolism but showed multiple reticular nodule infiltrates in the upper lobes right more than left. Respiratory panel was positive for adenovirus PCR. The patient denied sick contact, no recent travel, denied hemoptysis, no chronic fatigue or night sweats. She is actively smoking, denied alcohol or drug abuse.     Interval changes  11/9/2022   Patient Vitals for the past 8 hrs:   BP Temp Temp src Pulse Resp SpO2   11/09/22 1300 (!) 148/104 -- -- 99 20 90 %   11/09/22 1230 (!) 148/104 98.1 °F (36.7 °C) Oral 97 19 95 % 11/09/22 1145 -- -- -- (!) 101 20 94 %   11/09/22 1040 -- -- -- -- -- 91 %   11/09/22 1015 -- -- -- -- -- 96 %   11/09/22 0945 -- -- -- -- -- 91 %   11/09/22 0928 -- -- -- (!) 106 23 (!) 88 %   11/09/22 0905 -- -- -- 97 24 97 %   11/09/22 0900 102/72 -- -- 97 30 97 %   11/09/22 0830 -- -- -- 95 30 97 %   11/09/22 0800 119/86 97.5 °F (36.4 °C) Oral 98 29 96 %   11/09/22 0700 129/79 -- -- (!) 104 (!) 33 91 %             I have personally reviewed the past medical history, past surgical history, medications, social history, and family history, and I haveupdated the database accordingly. Allergies:   Patient has no known allergies. Review of Systems:     Review of Systems  As per history of present illness, other than above 12 system review was negative  Physical Examination :       Physical Exam  Constitutional:       Appearance: She is ill-appearing. HENT:      Right Ear: External ear normal.      Left Ear: External ear normal.      Mouth/Throat:      Pharynx: Oropharynx is clear. No oropharyngeal exudate. Eyes:      General: No scleral icterus. Conjunctiva/sclera: Conjunctivae normal.   Cardiovascular:      Rate and Rhythm: Normal rate and regular rhythm. Pulmonary:      Effort: Pulmonary effort is normal.      Breath sounds: Wheezing and rhonchi present. Abdominal:      General: There is no distension. Palpations: Abdomen is soft. Tenderness: There is no abdominal tenderness. Musculoskeletal:      Cervical back: Neck supple. No rigidity. Right lower leg: No edema. Left lower leg: No edema. Skin:     General: Skin is warm. Coloration: Skin is not jaundiced. Neurological:      General: No focal deficit present. Mental Status: She is alert and oriented to person, place, and time.        Past Medical History:     Past Medical History:   Diagnosis Date    Acute respiratory failure ([address]     due to COPD exacerbation    COPD (chronic obstructive pulmonary disease) ([facility] [address]     with hypoxia    Cough     Current every day smoker     Dyspnea     Heart murmur     as a child    Hypertension     Shortness of breath        Past Surgical  History:     Past Surgical History:   Procedure Laterality Date     SECTION         Medications:      ipratropium-albuterol  1 ampule Inhalation Q4H WA    sodium chloride flush  5-40 mL IntraVENous 2 times per day    enoxaparin  40 mg SubCUTAneous Daily    atorvastatin  40 mg Oral Daily    lisinopril  20 mg Oral BID    montelukast  10 mg Oral Nightly    benzonatate  200 mg Oral TID    dextromethorphan  60 mg Oral 2 times per day    cefTRIAXone (ROCEPHIN) IV  1,000 mg IntraVENous Q24H    azithromycin  500 mg IntraVENous Q24H    nicotine  1 patch TransDERmal Daily    methylPREDNISolone  40 mg IntraVENous Q6H       Social History:     Social History     Socioeconomic History    Marital status: Legally      Spouse name: Not on file    Number of children: Not on file    Years of education: Not on file    Highest education level: Not on file   Occupational History    Not on file   Tobacco Use    Smoking status: Every Day     Packs/day: 0.50     Types: Cigarettes    Smokeless tobacco: Never   Vaping Use    Vaping Use: Some days   Substance and Sexual Activity    Alcohol use: No    Drug use: No    Sexual activity: Not on file   Other Topics Concern    Not on file   Social History Narrative    Not on file     Social Determinants of Health     Financial Resource Strain: Not on file   Food Insecurity: Not on file   Transportation Needs: Not on file   Physical Activity: Not on file   Stress: Not on file   Social Connections: Not on file   Intimate Partner Violence: Not on file   Housing Stability: Not on file       Family History:   History reviewed. No pertinent family history.    Medical Decision Making:   I have independently reviewed/ordered the following labs:    CBC with Differential:   Recent Labs     22  1132 22  0415   WBC 18.4* 21.0*   HGB 15.9 15.2   HCT 49.1* 46.5*   * 468*   LYMPHOPCT 13*  --    MONOPCT 7  --      BMP:  Recent Labs     11/08/22  1132 11/09/22  0415   * 129*   K 4.5 4.8   CL 87* 88*   CO2 30 31   BUN 15 14   CREATININE 0.71 0.50   MG 2.3  --      Hepatic Function Panel: No results for input(s): PROT, LABALBU, BILIDIR, IBILI, BILITOT, ALKPHOS, ALT, AST in the last 72 hours. No results for input(s): RPR in the last 72 hours. No results for input(s): HIV in the last 72 hours. No results for input(s): BC in the last 72 hours. Lab Results   Component Value Date/Time    CREATININE 0.50 11/09/2022 04:15 AM    GLUCOSE 135 11/09/2022 04:15 AM       Detailed results: Thank you for allowing us to participate in the care of this patient. Please call with questions. This note is created with the assistance of a speech recognition program.  While intending to generate adocument that actually reflects the content of the visit, the document can still have some errors including those of syntax and sound a like substitutions which may escape proof reading. It such instances, actual meaningcan be extrapolated by contextual diversion.     Hernandez Pantoja MD  Office: [phone]  Perfect serve / office [phone]

## 2022-11-09 NOTE — H&P
History and Physical Service  [facility]    HISTORY AND PHYSICAL EXAMINATION            Date:   11/8/2022  Patient name:  Arcenio Tomas  MRN:   [id number]  Account:  [de-identified]  YOB: 1965  PCP:    Partha العراقي DTR  Code Status:    Full Code    Chief Complaint:     Chief Complaint   Patient presents with    Dizziness    Cough    Shortness of Breath         History Obtained From:     Patient, EMR, nursing staff    HPI     This patient is a 61 y.o. Non- / non  femalewho presents with  Shortness of breath worsening over the last 2 weeks worse over the last 2 days  Underlying history of COPD has albuterol nebulizer at home  Denies chest pain denies fevers chills at home  Symptoms are severe continuous aggravated by exertion improved with oxygen and steroids    Patient desaturating to 80s on pulse ox BiPAP was tried but she could not tolerate it eventually placed on high flow nasal cannula oxygen  CT chest angiogram did not show PE but did show evidence of mycobacterial infection   \"  Multifocal tree-in-bud and nodular opacities throughout both lungs as   detailed above likely representing sequela of recurrent aspiration or   atypical mycobacterial infection\"     patient placed on isolation started IV antibiotics, pulm and ID consulted        Review of Systems:     Positive for cough and shortness of breath  Denies chest pain or palpitations  Denies abdominal pain, diarrhea vomiting  Denies any new numbness tremors or weakness. A 10 point review of systems was performed and and negative except as mentioned in HPI  Positive and Negative as described in HPI.       Past Medical History:     Past Medical History:   Diagnosis Date    Acute respiratory failure ([address]     due to COPD exacerbation    COPD (chronic obstructive pulmonary disease) (HCC)     with hypoxia    Cough     Current every day smoker     Dyspnea     Heart murmur     as a child    Hypertension     Shortness of breath         Past Surgical History:     Past Surgical History:   Procedure Laterality Date     SECTION          Medications Prior to Admission:     Prior to Admission medications    Medication Sig Start Date End Date Taking? Authorizing Provider   fluticasone (FLONASE) 50 MCG/ACT nasal spray 1 spray by Each Nostril route daily   Yes Historical Provider, MD   lisinopril (PRINIVIL;ZESTRIL) 20 MG tablet Take 1 tablet by mouth 2 times daily 19   Juana Del Toro MD   atorvastatin (LIPITOR) 40 MG tablet Take 1 tablet by mouth daily 19   Juana Del Toro MD   albuterol sulfate  (90 Base) MCG/ACT inhaler Inhale 2 puffs into the lungs every 6 hours as needed for Wheezing    Historical Provider, MD   loratadine (CLARITIN) 10 MG tablet Take 10 mg by mouth daily    Historical Provider, MD   Multiple Vitamin (MULTIVITAMIN) tablet Take 1 tablet by mouth daily    Historical Provider, MD   montelukast (SINGULAIR) 10 MG tablet Take 10 mg by mouth nightly    Historical Provider, MD   tiotropium (SPIRIVA) 18 MCG inhalation capsule Inhale 18 mcg into the lungs daily. Historical Provider, MD   albuterol (PROVENTIL) (2.5 MG/3ML) 0.083% nebulizer solution Take 2.5 mg by nebulization every 6 hours as needed for Wheezing. Historical Provider, MD        Allergies:     Patient has no known allergies. Social History:     Tobacco:    reports that she has been smoking cigarettes. She has been smoking an average of .5 packs per day. She has never used smokeless tobacco.  Alcohol:      reports no history of alcohol use. Drug Use:  reports no history of drug use. Family History:     History reviewed. No pertinent family history. Physical Exam:   BP (!) 126/92   Pulse 97   Temp 98.9 °F (37.2 °C)   Resp 30   Ht 4' 11\" (1.499 m)   Wt 162 lb (73.5 kg)   SpO2 94%   BMI 32.72 kg/m²   No results for input(s): POCGLU in the last 72 hours.     General Appearance:  alert, well appearing, and in moderate respiratory distress  Mental status: oriented to person, place, and time with normal affect  Head:  normocephalic, atraumatic. Eye: no icterus, redness, pupils equal and reactive, extraocular eye movements intact, conjunctiva clear  Ear: normal external ear, no discharge, hearing intact  Nose:  no drainage noted  Mouth: mucous membranes moist  Neck: supple, no carotid bruits, thyroid not palpable  Lungs: Bilateral wheeze increased effort of breathing   cardiovascular: normal rate, regular rhythm, no murmur, gallop, rub. Abdomen: Soft, nontender, nondistended, normal bowel sounds, no hepatomegaly or splenomegaly  Neurologic: There are no new focal motor or sensory deficits, normal muscle tone and bulk, no abnormal sensation, normal speech, cranial nerves II through XII grossly intact  Skin: No gross lesions, rashes, bruising or bleeding on exposed skin area  Extremities:  no pedal edema or calf pain with palpation  Psych: normal affect     Investigations:      Laboratory Testing:  Recent Results (from the past 24 hour(s))   Arterial Blood Gases    Collection Time: 11/08/22 11:30 AM   Result Value Ref Range    pH, Arterial 7.366 7.350 - 7.450    pCO2, Arterial 58.3 (HH) 35.0 - 45.0 mmHg    pO2, Arterial 63.4 (L) 80.0 - 100.0 mmHg    HCO3, Arterial 33.3 (H) 22.0 - 26.0 mmol/L    Positive Base Excess, Art 8.0 (H) 0.0 - 2.0 mmol/L    O2 Sat, Arterial 87.7 (LL) 95 - 98 %    Carboxyhemoglobin 3.5 0 - 5 %    Methemoglobin 0.4 0.0 - 1.9 %    Pt Temp 37     O2 Device/Flow/% HFNC     Respiratory Rate 28     Naeem Test PASS     Sample Site Right Radial Artery     Pt.  Position SEMI-FOWLERS     Mode HFNC 30L 50%     Total Rate 28     FIO2 50     Text for Respiratory RESULTS TO DR MAS [facility]    Troponin    Collection Time: 11/08/22 11:32 AM   Result Value Ref Range    Troponin, High Sensitivity 56 (HH) 0 - 14 ng/L   APTT    Collection Time: 11/08/22 11:32 AM   Result Value Ref Range    PTT 30.0 24.0 - 36.0 sec   Protime-INR    Collection Time: 11/08/22 11:32 AM   Result Value Ref Range    Protime 13.1 11.8 - 14.6 sec    INR 1.0    Phosphorus    Collection Time: 11/08/22 11:32 AM   Result Value Ref Range    Phosphorus 3.1 2.6 - 4.5 mg/dL   Magnesium    Collection Time: 11/08/22 11:32 AM   Result Value Ref Range    Magnesium 2.3 1.6 - 2.6 mg/dL   Basic Metabolic Panel    Collection Time: 11/08/22 11:32 AM   Result Value Ref Range    Glucose 158 (H) 70 - 99 mg/dL    BUN 15 6 - 20 mg/dL    Creatinine 0.71 0.50 - 0.90 mg/dL    Est, Glom Filt Rate >60 >60 mL/min/1.73m2    Calcium 10.1 8.6 - 10.4 mg/dL    Sodium 130 (L) 135 - 144 mmol/L    Potassium 4.5 3.7 - 5.3 mmol/L    Chloride 87 (L) 98 - 107 mmol/L    CO2 30 20 - 31 mmol/L    Anion Gap 13 9 - 17 mmol/L   CBC with Auto Differential    Collection Time: 11/08/22 11:32 AM   Result Value Ref Range    WBC 18.4 (H) 3.5 - 11.0 k/uL    RBC 5.33 (H) 4.0 - 5.2 m/uL    Hemoglobin 15.9 12.0 - 16.0 g/dL    Hematocrit 49.1 (H) 36 - 46 %    MCV 92.2 80 - 100 fL    MCH 29.9 26 - 34 pg    MCHC 32.4 31 - 37 g/dL    RDW 13.9 11.5 - 14.9 %    Platelets 860 (H) 700 - 450 k/uL    MPV 8.2 6.0 - 12.0 fL    Seg Neutrophils 64 36 - 66 %    Lymphocytes 13 (L) 24 - 44 %    Monocytes 7 1 - 7 %    Eosinophils % 0 0 - 4 %    Basophils 0 0 - 2 %    Bands 14 (H) 0 - 10 %    Metamyelocytes 2 (H) 0 %    Segs Absolute 11.77 (H) 1.3 - 9.1 k/uL    Absolute Lymph # 2.39 1.0 - 4.8 k/uL    Absolute Mono # 1.29 0.1 - 1.3 k/uL    Absolute Eos # 0.00 0.0 - 0.4 k/uL    Basophils Absolute 0.00 0.0 - 0.2 k/uL    Absolute Bands # 2.58 (H) 0.0 - 1.0 k/uL    Metamyelocytes Absolute 0.37 (H) 0 k/uL    Morphology VACUOLATED NEUTROPHILS PRESENT     Morphology SLT TOXIC GRANULATION PRESENT    EKG 12 Lead    Collection Time: 11/08/22 11:41 AM   Result Value Ref Range    Ventricular Rate 105 BPM    Atrial Rate 105 BPM    P-R Interval 114 ms    QRS Duration 92 ms    Q-T Interval 382 ms    QTc Calculation (Bazett) 504 ms    P Axis 76 degrees    R Axis -163 degrees    T Axis -30 degrees   Troponin    Collection Time: 11/08/22 12:51 PM   Result Value Ref Range    Troponin, High Sensitivity 50 (H) 0 - 14 ng/L   COVID-19 & Influenza Combo    Collection Time: 11/08/22  3:47 PM    Specimen: Nasopharyngeal Swab   Result Value Ref Range    Specimen Description . NASOPHARYNGEAL SWAB     Source . NASOPHARYNGEAL SWAB     SARS-CoV-2 RNA, RT PCR Not Detected Not Detected    INFLUENZA A Not Detected Not Detected    INFLUENZA B Not Detected Not Detected   Arterial Blood Gases    Collection Time: 11/08/22  3:51 PM   Result Value Ref Range    pH, Arterial 7.358 7.350 - 7.450    pCO2, Arterial 61.8 (HH) 35.0 - 45.0 mmHg    pO2, Arterial 58.0 (LL) 80.0 - 100.0 mmHg    HCO3, Arterial 34.7 (H) 22.0 - 26.0 mmol/L    Positive Base Excess, Art 9.2 (H) 0.0 - 2.0 mmol/L    O2 Sat, Arterial 86.7 (LL) 95 - 98 %    Carboxyhemoglobin 2.2 0 - 5 %    Methemoglobin 0.5 0.0 - 1.9 %    O2 Device/Flow/% Cannula     Respiratory Rate 25     Naeem Test PASS     Sample Site Right Radial Artery        Recent Labs     11/08/22  1132   HGB 15.9   HCT 49.1*   WBC 18.4*   MCV 92.2   *   K 4.5   CL 87*   CO2 30   BUN 15   CREATININE 0.71   GLUCOSE 158*   INR 1.0   PROTIME 13.1   APTT 30.0       Hematology:  Recent Labs     11/08/22  1132   WBC 18.4*   RBC 5.33*   HGB 15.9   HCT 49.1*   MCV 92.2   MCH 29.9   MCHC 32.4   RDW 13.9   *   MPV 8.2   INR 1.0     Chemistry:  Recent Labs     11/08/22  1132   *   K 4.5   CL 87*   CO2 30   GLUCOSE 158*   BUN 15   CREATININE 0.71   MG 2.3   ANIONGAP 13   LABGLOM >60   CALCIUM 10.1   PHOS 3.1     No results for input(s): PROT, LABALBU, LABA1C, T9QAJWW, S0FIXPP, FT4, TSH, AST, ALT, LDH, GGT, ALKPHOS, LABGGT, BILITOT, BILIDIR, AMMONIA, AMYLASE, LIPASE, LACTATE, CHOL, HDL, LDLCHOLESTEROL, CHOLHDLRATIO, TRIG, VLDL, VDC49LA, PHENYTOIN, PHENYF, URICACID, POCGLU in the last 72 hours.     Imaging/Diagnostics:       XR CHEST PORTABLE    Result Date: 11/8/2022  EXAMINATION: ONE XRAY VIEW OF THE CHEST 11/8/2022 8:37 am COMPARISON: 10/19/2019 HISTORY: ORDERING SYSTEM PROVIDED HISTORY: sob TECHNOLOGIST PROVIDED HISTORY: sob Reason for Exam: sob FINDINGS: Cardial pericardial silhouette is prominent but stable. Increased interstitial opacities noted bilaterally. Focal infiltrate is not identified. No pneumothorax. No free air. No acute bony abnormality. Increased interstitial opacity. While much or all of this may be chronic, please correlate with any clinical evidence of acute interstitial edema. CT CHEST PULMONARY EMBOLISM W CONTRAST    Result Date: 11/8/2022  EXAMINATION: CTA OF THE CHEST 11/8/2022 12:44 pm TECHNIQUE: CTA of the chest was performed after the administration of intravenous contrast.  Multiplanar reformatted images are provided for review. MIP images are provided for review. Automated exposure control, iterative reconstruction, and/or weight based adjustment of the mA/kV was utilized to reduce the radiation dose to as low as reasonably achievable. COMPARISON: 10/31/2019 HISTORY: ORDERING SYSTEM PROVIDED HISTORY: sob tachycardia TECHNOLOGIST PROVIDED HISTORY: sob tachycardia Decision Support Exception - unselect if not a suspected or confirmed emergency medical condition->Emergency Medical Condition (MA) Reason for Exam: sob, COPD 79-year-old female with shortness of breath, COPD, tachycardia FINDINGS: Pulmonary Arteries: No obvious filling defect in the pulmonary arterial vasculature that meets the criteria for pulmonary embolus within the limitations of this study. Evaluation of the pulmonary arterial vasculature is limited due to streak artifact from the contrast bolus in the SVC, suboptimal bolus timing, and respiratory motion. Mediastinum: Visualized thyroid gland grossly unremarkable in appearance. Atheromatous plaque and atherosclerotic calcification of the aorta. Coronary artery disease.   Bilateral hilar lymph node enlargement. No axillary lymphadenopathy. No mediastinal lymphadenopathy. No dissection flap within the visualized thoracic aorta. No pericardial or pleural effusions. No periaortic or mediastinal hemorrhage. Lungs/pleura: Trachea and proximal central airways appear patent. Respiratory motion throughout both lungs. Mild emphysema. Bronchiectasis and scarring along the medial right upper lobe. Tree-in-bud and nodular opacities throughout the bilateral upper lobes. Similar findings are seen within the right middle lobe, lingula, and superior segments of the lower lobes as well as the anteroinferior lower lobes. Upper Abdomen: Fatty liver. Atheromatous plaque and atherosclerotic calcification of the upper abdominal aorta and branch vasculature. Evaluation of the upper abdomen is limited due to respiratory motion. Soft Tissues/Bones: Mild diffuse degenerative changes throughout the spine. 1. No clear evidence for central pulmonary embolus within the limitations of this study. 2. Multifocal tree-in-bud and nodular opacities throughout both lungs as detailed above likely representing sequela of recurrent aspiration or atypical mycobacterial infection. Follow-up is recommended to document resolution. 3. Mild emphysema. Respiratory motion. Bronchiectasis and scarring along the medial right upper lobe. 4. Atheromatous plaque and atherosclerotic calcification of the aorta. Coronary artery disease. 5. Bilateral hilar lymph node enlargement, likely reactive. 6. Fatty liver.         Current Facility-Administered Medications   Medication Dose Route Frequency Provider Last Rate Last Admin    ipratropium-albuterol (DUONEB) nebulizer solution 1 ampule  1 ampule Inhalation Q4H WA Rubens Guan DO   1 ampule at 11/08/22 1907    sodium chloride flush 0.9 % injection 10 mL  10 mL IntraVENous PRN Rubens Guan DO   10 mL at 11/08/22 1245    sodium chloride flush 0.9 % injection 5-40 mL  5-40 mL IntraVENous 2 times per day Nathaniel Holcomb MD        sodium chloride flush 0.9 % injection 5-40 mL  5-40 mL IntraVENous PRN Nathaniel Holcomb MD        0.9 % sodium chloride infusion  25 mL IntraVENous PRN Nathaniel Holcomb MD        enoxaparin (LOVENOX) injection 40 mg  40 mg SubCUTAneous Daily Nathaniel Holcomb MD   40 mg at 11/08/22 1539    ondansetron (ZOFRAN-ODT) disintegrating tablet 4 mg  4 mg Oral Q8H PRN Nathaniel Holcomb MD        Or    [facility]ron [facility] injection 4 mg  4 mg IntraVENous Q6H PRN Nathaniel Holcomb MD        polyethylene glycol (GLYCOLAX) packet 17 g  17 g Oral Daily PRN Nathaniel Holcomb MD        acetaminophen (TYLENOL) tablet 650 mg  650 mg Oral Q6H PRN Nathaniel Holcomb MD        Or    acetaminophen (TYLENOL) suppository 650 mg  650 mg Rectal Q6H PRN Nathaniel Holcomb MD        atorvastatin (LIPITOR) tablet 40 mg  40 mg Oral Daily Nathaniel Holcomb MD        lisinopril (PRINIVIL;ZESTRIL) tablet 20 mg  20 mg Oral BID Nathaniel Holcomb MD        montelukast (SINGULAIR) tablet 10 mg  10 mg Oral Nightly Nathaniel Holcomb MD        benzonatate (TESSALON) capsule 200 mg  200 mg Oral TID Lisa Atkins MD        dextromethorphan [facility]) 30 MG/5ML extended release liquid 60 mg  60 mg Oral 2 times per day Lisa Atkins MD        [START ON 11/9/2022] cefTRIAXone (ROCEPHIN) 1,000 mg in sodium chloride 0.9 % 50 mL IVPB mini-bag  1,000 mg IntraVENous Q24H Lisa Atkins MD        [START ON 11/9/2022] azithromycin (ZITHROMAX) 500 mg in D5W 250ml addavial  500 mg IntraVENous Q24H Lisa Atkins MD        nicotine (NICODERM CQ) 21 MG/24HR 1 patch  1 patch TransDERmal Daily Lisa Atkins MD        methylPREDNISolone sodium (SOLU-MEDROL) injection 40 mg  40 mg IntraVENous Q6H Lisa Atkins MD         Current Outpatient Medications   Medication Sig Dispense Refill    fluticasone (FLONASE) 50 MCG/ACT nasal spray 1 spray by Each Nostril route daily      lisinopril (PRINIVIL;ZESTRIL) 20 MG tablet Take 1 tablet by mouth 2 times daily 30 tablet 3    atorvastatin (LIPITOR) 40 MG tablet Take 1 tablet by mouth daily 90 tablet 1    albuterol sulfate  (90 Base) MCG/ACT inhaler Inhale 2 puffs into the lungs every 6 hours as needed for Wheezing      loratadine (CLARITIN) 10 MG tablet Take 10 mg by mouth daily      Multiple Vitamin (MULTIVITAMIN) tablet Take 1 tablet by mouth daily      montelukast (SINGULAIR) 10 MG tablet Take 10 mg by mouth nightly      tiotropium (SPIRIVA) 18 MCG inhalation capsule Inhale 18 mcg into the lungs daily.  albuterol (PROVENTIL) (2.5 MG/3ML) 0.083% nebulizer solution Take 2.5 mg by nebulization every 6 hours as needed for Wheezing. Impressions :     1. Principal Problem:    Acute respiratory failure ([facility] [address]  Resolved Problems:    * No resolved hospital problems. *        2.  has a past medical history of Acute respiratory failure ([facility] [address] COPD (chronic obstructive pulmonary disease) ([facility] [address] Cough, Current every day smoker, Dyspnea, Heart murmur, Hypertension, and Shortness of breath. Plans:     39-year-old female admitted for acute respiratory failure secondary to COPD exacerbation noted to have abnormal CT chest multifocal infiltrates concern for mycobacterial infection pulm and ID consulted patient placed on droplet plus isolation COVID-19 negative    Acute on chronic hypoxic hypercapnic respiratory failure steroids bronchodilators high flow nasal cannula oxygen since patient unable to tolerate BiPAP CODE STATUS full.    Multifocal pneumonia-started on Rocephin azithromycin  pneumonia panel ordered sepsis secondary to multifocal pneumonia  Concern for mycobacterial infection-droplet plus isolation, ID consult, HIV, TB interferon ordered  Current heavy smoker    DVT pplx-Lovenox  Antibiotics started/continued-Rocephin, azithromycin  Code status-full    Anabell Field MD  11/8/2022  7:11 PM

## 2022-11-09 NOTE — PROGRESS NOTES
Patient took [address] out of her nose and SpO2 dropped down to 70%. Writer placed oxygen back in nose and patient could not recover. Settings increased to 40L 75%. SpO2 increased to 90%.

## 2022-11-09 NOTE — PROGRESS NOTES
[address]    PROGRESS NOTE             11/9/2022    7:23 AM    Name:   Dona Mojica  MRN:     [id number]     Acct:      [de-identified]   Room:   2005/2005-01   Day:  1  Admit Date:  11/8/2022 11:16 AM    PCP:  Pooja Wilson DTR  Code Status:  Full Code    Subjective:     C/C:   Chief Complaint   Patient presents with    Dizziness    Cough    Shortness of Breath     Interval History Status: improved. The patient was seen and examined at the bedside. The patient was saturating 97% on 40 L via heated high flow nasal cannula. BP was stable at 122/86. HR was 90. On examination, the patient had shortness of breath and cough. She denied headache, dizziness, syncope, fever, chills, abdominal pain, nausea, vomiting, constipation and diarrhea. Brief History:     The patient is a 61year old non- female with a history of COPD (not on home oxygen), smoking (60 pack-years), hypertension, and hyperlipidemia who presents with shortness of breath worsening over the past week. The patient endorses a cough productive of green phlegm that has also worsened over the past week. She denies headache, dizziness, syncope, fever, chills, abdominal pain, nausea, vomiting, constipation and diarrhea. In the ED, the patient was hypoxic  (Sp02 58% on room air), tachycardic (108), and tachypneic (34). ABGs showed hypercapnia (pC02 58.3) and hypoxia (pO2 63.4). The patient was placed on BiPAP, but did not tolerate it and was placed on high flow nasal cannula. The patient was on 30 L high flow with 50% Fi02 and saturating 92%. The patient was given one dose of Solumedrol 125 mg and one dose of Duoneb nebulizer. The patient was given a bolus of IV normal saline. Respiratory panel was positive for adenovirus. IV ceftriaxone was started. Glucose was 158. Creatinine was 0.71. WBCs were elevated at 18.4. Troponin was elevated at 56; repeat 50.  EKG showed sinus tachycardia with occasional PVCs, possible LA enlargement, right superior axis deviation, and incomplete right bundle branch block, RV hypertrophy, T wave abnormality (possible inferior and anterolateral ischemia). CT chest was negative for pulmonary embolism, but showed multifocal tree-in-bud and nodular opacities throughout both lungs likely representing recurrent aspiration or atypical mycobacterium infection; mild emphysema, bronchiectasis and scarring along medial right upper lobe. The patient was admitted for management of acute on chronic hypoxic, hypercapnic respiratory failure secondary to COPD exacerbation and multifocal pneumonia. Pulmonology and infectious disease were consulted       Review of Systems:     Review of Systems   Constitutional:  Positive for fatigue. Negative for activity change and chills. HENT:  Negative for congestion and rhinorrhea. Respiratory:  Positive for cough and shortness of breath. Negative for chest tightness, wheezing and stridor. Cardiovascular:  Negative for chest pain, palpitations and leg swelling. Gastrointestinal:  Negative for abdominal distention, abdominal pain, constipation, diarrhea, nausea and vomiting. Endocrine: Negative for cold intolerance and heat intolerance. Genitourinary:  Negative for dysuria, frequency and urgency. Musculoskeletal:  Negative for arthralgias and back pain. Skin:  Negative for pallor and rash. Neurological:  Negative for tremors, syncope, weakness, light-headedness and headaches. Psychiatric/Behavioral:  Negative for agitation and confusion. Medications:      Allergies:  No Known Allergies    Current Meds:   Scheduled Meds:    ipratropium-albuterol  1 ampule Inhalation Q4H WA    sodium chloride flush  5-40 mL IntraVENous 2 times per day    enoxaparin  40 mg SubCUTAneous Daily    atorvastatin  40 mg Oral Daily    lisinopril  20 mg Oral BID    montelukast  10 mg Oral Nightly    benzonatate  200 mg Oral TID dextromethorphan  60 mg Oral 2 times per day    cefTRIAXone (ROCEPHIN) IV  1,000 mg IntraVENous Q24H    azithromycin  500 mg IntraVENous Q24H    nicotine  1 patch TransDERmal Daily    methylPREDNISolone  40 mg IntraVENous Q6H     Continuous Infusions:    sodium chloride       PRN Meds: sodium chloride flush, sodium chloride flush, sodium chloride, ondansetron **OR** ondansetron, polyethylene glycol, acetaminophen **OR** acetaminophen    Data:     Past Medical History:   has a past medical history of Acute respiratory failure ([facility] [address] COPD (chronic obstructive pulmonary disease) ([facility] [address] Cough, Current every day smoker, Dyspnea, Heart murmur, Hypertension, and Shortness of breath. Social History:   reports that she has been smoking cigarettes. She has been smoking an average of .5 packs per day. She has never used smokeless tobacco. She reports that she does not drink alcohol and does not use drugs. Family History: History reviewed. No pertinent family history. Vitals:  /79   Pulse (!) 104   Temp 98 °F (36.7 °C) (Oral)   Resp (!) 33   Ht 4' 11\" (1.499 m)   Wt 128 lb 15.5 oz (58.5 kg)   SpO2 91%   BMI 26.05 kg/m²   Temp (24hrs), Av.4 °F (36.9 °C), Min:98 °F (36.7 °C), Max:98.9 °F (37.2 °C)    No results for input(s): POCGLU in the last 72 hours. I/O(24Hr): No intake or output data in the 24 hours ending 22 0723    Labs:  [unfilled]    Lab Results   Component Value Date/Time    SPECIAL NOT REPORTED 10/28/2019 07:58 PM     Lab Results   Component Value Date/Time    CULTURE PENDING 2022 10:25 PM       [unfilled]    Radiology:    XR CHEST PORTABLE    Result Date: 2022  EXAMINATION: ONE XRAY VIEW OF THE CHEST 2022 8:37 am COMPARISON: 10/19/2019 HISTORY: ORDERING SYSTEM PROVIDED HISTORY: sob TECHNOLOGIST PROVIDED HISTORY: sob Reason for Exam: sob FINDINGS: Cardial pericardial silhouette is prominent but stable. Increased interstitial opacities noted bilaterally.   Focal infiltrate is not identified. No pneumothorax. No free air. No acute bony abnormality. Increased interstitial opacity. While much or all of this may be chronic, please correlate with any clinical evidence of acute interstitial edema. CT CHEST PULMONARY EMBOLISM W CONTRAST    Result Date: 11/8/2022  EXAMINATION: CTA OF THE CHEST 11/8/2022 12:44 pm TECHNIQUE: CTA of the chest was performed after the administration of intravenous contrast.  Multiplanar reformatted images are provided for review. MIP images are provided for review. Automated exposure control, iterative reconstruction, and/or weight based adjustment of the mA/kV was utilized to reduce the radiation dose to as low as reasonably achievable. COMPARISON: 10/31/2019 HISTORY: ORDERING SYSTEM PROVIDED HISTORY: sob tachycardia TECHNOLOGIST PROVIDED HISTORY: sob tachycardia Decision Support Exception - unselect if not a suspected or confirmed emergency medical condition->Emergency Medical Condition (MA) Reason for Exam: sob, COPD 58-year-old female with shortness of breath, COPD, tachycardia FINDINGS: Pulmonary Arteries: No obvious filling defect in the pulmonary arterial vasculature that meets the criteria for pulmonary embolus within the limitations of this study. Evaluation of the pulmonary arterial vasculature is limited due to streak artifact from the contrast bolus in the SVC, suboptimal bolus timing, and respiratory motion. Mediastinum: Visualized thyroid gland grossly unremarkable in appearance. Atheromatous plaque and atherosclerotic calcification of the aorta. Coronary artery disease. Bilateral hilar lymph node enlargement. No axillary lymphadenopathy. No mediastinal lymphadenopathy. No dissection flap within the visualized thoracic aorta. No pericardial or pleural effusions. No periaortic or mediastinal hemorrhage. Lungs/pleura: Trachea and proximal central airways appear patent. Respiratory motion throughout both lungs.   Mild Left lower leg: No edema. Skin:     Capillary Refill: Capillary refill takes less than 2 seconds. Coloration: Skin is not jaundiced or pale. Neurological:      General: No focal deficit present. Mental Status: She is oriented to person, place, and time. Sensory: No sensory deficit. Motor: No weakness. Psychiatric:         Mood and Affect: Mood normal.         Assessment:        Primary Problem  Acute respiratory failure [facility])    Active Hospital Problems    Diagnosis Date Noted    Acute respiratory failure ([facility] [address] [J96.00] 11/08/2022     Priority: Medium       Plan:        Acute on chronic hypoxic, hypercapnic respiratory failure 2/2 COPD exacerbation and multifocal pneumonia r/o TB  -ABGs: pH 7.366, pC02 58.3, p02 63.4  -Patient was on 40L high flow cannula; Currently on salter 8L (goal oxygen saturation > 88%)  -CXR: increased interstitial opacity (most likely chronic)  -CT PE: multifocal tree-in-bud and nodular opacities throughout both lungs likely representing recurrent aspiration or atypical mycobacterium infection; mild emphysema, bronchiectasis and scarring along medial right upper lobe  -WBCs on admission 18.4  -Respiratory viral panel: positive for adenovirus  -Respiratory culture pending  -Legionella, S.  Pneumoniae pending   -QuantiFERON gold test ordered  -Continue Solu-Medrol 40 mg IV q 6 hours  -Continue Azithromycin 500 mg IV q 24 hours  -Continue Ceftriaxone 1000 mg IV q 24 hours   -Continue DuoNeb aerols  -Continue anti-tussives: Benzozonate, Delsym   -Continue Singulair 10 mg po nightly   -Pulmonary consulted  -ID consulted    Hyponatremia (likely SIADH)  -Na+ on admission 130  -Continue to monitor     Hyperlipidemia  -Continue Lipitor 40 mg po daily     Hypertension  -Continue Hydralazine 5 mg IV q 6 hours PRN   -Continue Lisinopril 20 mg po BID     Contact and droplet isolation   DVT prophylaxis: Lovenox 40 mg SC daily   Code: Full code  Diet: Regular adult diet PT/OT/SW consulted      Reginald Banks MD  11/9/2022  7:23 AM

## 2022-11-09 NOTE — PROGRESS NOTES
[address]   Occupational Therapy Evaluation  Date: 22  Patient Name: Martha Kaplan       Room:   MRN: [id number]  Account: [de-identified]   : 1965  (61 y.o.) Gender: female     Discharge Recommendations:  Further Occupational Therapy is recommended upon facility discharge. OT Equipment Recommendations  Other: TBD    Referring Practitioner: Guera Noriega MD  Diagnosis: Acute respiratory failure      Treatment Diagnosis: Impaired self care status    Past Medical History:  has a past medical history of Acute respiratory failure ([facility] [address] COPD (chronic obstructive pulmonary disease) ([facility] [address] Cough, Current every day smoker, Dyspnea, Heart murmur, Hypertension, and Shortness of breath. Past Surgical History:   has a past surgical history that includes  section. Restrictions  Restrictions/Precautions  Restrictions/Precautions: Isolation; Fall Risk;Contact Precautions; Up as Tolerated;General Precautions (Contact and Droplet isolation - adenovirus pneumonia)  Required Braces or Orthoses?: No  Implants present? :  (Pt denies)      Vitals  Vitals  Heart Rate: 94  Heart Rate Source: Monitor  BP: 102/70  BP Location: Right upper arm  BP Method: Automatic  Patient Position: Sitting  MAP (Calculated): 81  Resp: 25  SpO2: 94 %  O2 Device: High flow nasal cannula  Comment: Pt demonstrates mild SpO2 desaturation during light activity/ambulation to 88%; towards end of session pt becomes nauseated and retches/coughs resulting in more significant SpO2 Desat temporarily to high 70's with delayed rebound to 91% when retching/coughing ends. Subjective  Subjective: \"I'm just tired. \" Pt was pleasant and agreeable to OT/PT eval  Comments: Ok per [employer] for OT/PT eval  Subjective  Pain: Pt denies pain      Social/Functional History  Social/Functional History  Lives With: Significant other  Type of Home: House  Home Layout: One level  Home Access: Stairs to enter without rails  Entrance Stairs - Number of Steps: 1 MEHREEN  Bathroom Shower/Tub: Tub/Shower unit, Curtain  Bathroom Toilet: Standard  Home Equipment:  (No DME)  Has the patient had two or more falls in the past year or any fall with injury in the past year?: No  ADL Assistance: Independent  Homemaking Assistance: Independent  Homemaking Responsibilities: Yes  Ambulation Assistance: Independent  Transfer Assistance: Independent  Active : No  Patient's  Info: Boyfriend or daughter  Mode of Transportation: Car  IADL Comments: Pt reports sleeping in flat bed  Additional Comments: Pt reports that daughter lives close by and able to assist as needed. Daughter is not working. Pt reports that boyfriend works during the week and is home during the weekend      Objective  Cognition  Orientation  Overall Orientation Status: Within Functional Limits  Cognition  Overall Cognitive Status: WNL   Sensation  Overall Sensation Status: WFL (Pt denies)    Activities of Daily Living  ADL  Feeding: Setup  Grooming: Setup  UE Bathing: Stand by assistance  LE Bathing: Contact guard assistance  UE Dressing: Stand by assistance  LE Dressing: Contact guard assistance  Toileting: Contact guard assistance  Toileting Skilled Clinical Factors: OT facilitated pts engagement in toileting task with CGA for safety while standing  Additional Comments: ADL scores based on clinical reasoning and skilled observation unless otherwise noted. Pt currently limited due to decreased strength, balance, and activity tolerance impacting safety and independence with self care tasks.          UE Function  LUE AROM (degrees)  LUE AROM : WFL  Left Hand AROM (degrees)  Left Hand AROM: WFL  Tone LUE  LUE Tone: Normotonic  LUE Strength  Gross LUE Strength: WFL  L Hand General: 4/5    RUE AROM (degrees)  RUE AROM : WFL  Right Hand AROM (degrees)  Right Hand AROM: WFL  Tone RUE  RUE Tone: Normotonic  RUE Strength  Gross RUE Strength: WFL  R Hand General: 4/5         Fine Motor Skills/Coordination  Coordination  Movements Are Fluid And Coordinated: Yes                Mobility  Bed Mobility  Bed mobility  Bridging: Unable to assess  Rolling to Left: Unable to assess  Rolling to Right: Unable to assess  Supine to Sit: Unable to assess  Sit to Supine: Unable to assess  Scooting: Stand by assistance  Bed Mobility Comments: Pt seated up in bed upon arrival and seated EOB upon exit    Balance  Balance  Sitting Balance: Stand by assistance  Standing Balance: Contact guard assistance (1 LOB)       Transfers  Transfers  Sit to stand: Contact guard assistance  Stand to sit: Contact guard assistance  Toilet Transfers  Toilet - Technique: Ambulating  Equipment Used: Standard toilet  Toilet Transfer: Contact guard assistance    Functional Mobility  Functional - Mobility Device: No device  Activity: Other, To/from bathroom (with room)  Assist Level: Contact guard assistance  Functional Mobility Comments: Verbal cues for safety. 1 LOB with ability to right self.     Assessment  Assessment  Performance deficits / Impairments: Decreased ADL status, Decreased functional mobility , Decreased endurance, Decreased balance, Decreased high-level IADLs  Treatment Diagnosis: Impaired self care status  Prognosis: Good  Decision Making: Medium Complexity  Discharge Recommendations: Patient would benefit from continued therapy after discharge    Activity Tolerance  Activity Tolerance: Patient Tolerated treatment well, Patient limited by fatigue    Safety Devices  Type of Devices: Call light within reach, Gait belt, Patient at risk for falls, Left in bed, Nurse notified    Patient Education  Patient Education  Education Given To: Patient  Education Provided: Role of Therapy, Plan of Care, Transfer Training  Education Method: Verbal  Barriers to Learning: None  Education Outcome: Verbalized understanding      Functional Outcome Measures  AM-PAC Daily Activity Inpatient   How much help for putting on and taking off regular lower body clothing?: A Little  How much help for Bathing?: A Little  How much help for Toileting?: A Little  How much help for putting on and taking off regular upper body clothing?: A Little  How much help for taking care of personal grooming?: A Little  How much help for eating meals?: A Little  AM-[facility] Inpatient Daily Activity Raw Score: 18  AM-PAC Inpatient ADL T-Scale Score : 38.66  ADL Inpatient CMS 0-100% Score: 46.65  ADL Inpatient CMS G-Code Modifier : CK       Goals     Short Term Goals  Time Frame for Short Term Goals: by discharge  Short Term Goal 1: Pt will complete BADLs with modified independence and Good safety while maintaining Spo2 above 90%  Short Term Goal 2: Pt will complete functional transfers/mobility during self care tasks with mod I and Good safety while maintaining SpO2 above 90%  Short Term Goal 3: Pt will tolerate standing 7+ minutes during functional activity of choice with Good safety while maintaining Spo2 above 90%  Short Term Goal 4: Pt will verbalize/demonstrate Good understanding of home safety/fall prevention/energy conservation strategies to increase safety and independence with self care and mobility  Short Term Goal 5: Pt will participate in 15+ minutes of therapeutic exercises/functional activities to increase safety and independence with self care and mobility    Plan  Occupational Therapy Plan  Times Per Week: 3-5  Current Treatment Recommendations: Self-Care / ADL, Strengthening, Balance training, Functional mobility training, Endurance training, Safety education & training, Patient/Caregiver education & training, Equipment evaluation, education, & procurement, Home management training      OT Individual Minutes  OT Individual Minutes  Time In: 8092  Time Out: 1395  Minutes: 42  Time Code Minutes   Timed Code Treatment Minutes: 23 Minutes        Electronically signed by Luis Greene OT on 11/9/22 at 3:52 PM EST

## 2022-11-10 ENCOUNTER — APPOINTMENT (OUTPATIENT)
Dept: NON INVASIVE DIAGNOSTICS | Age: 57
DRG: 720 | End: 2022-11-10
Payer: COMMERCIAL

## 2022-11-10 ENCOUNTER — APPOINTMENT (OUTPATIENT)
Dept: GENERAL RADIOLOGY | Age: 57
DRG: 720 | End: 2022-11-10
Payer: COMMERCIAL

## 2022-11-10 ENCOUNTER — APPOINTMENT (OUTPATIENT)
Dept: VASCULAR LAB | Age: 57
DRG: 720 | End: 2022-11-10
Payer: COMMERCIAL

## 2022-11-10 LAB
ALLEN TEST: ABNORMAL
ANION GAP SERPL CALCULATED.3IONS-SCNC: 6 MMOL/L (ref 9–17)
BUN BLDV-MCNC: 14 MG/DL (ref 6–20)
CALCIUM SERPL-MCNC: 9.1 MG/DL (ref 8.6–10.4)
CHLORIDE BLD-SCNC: 90 MMOL/L (ref 98–107)
CO2: 34 MMOL/L (ref 20–31)
CREAT SERPL-MCNC: 0.46 MG/DL (ref 0.5–0.9)
CULTURE: ABNORMAL
DIRECT EXAM: ABNORMAL
FIO2: 40
GFR SERPL CREATININE-BSD FRML MDRD: >60 ML/MIN/1.73M2
GLUCOSE BLD-MCNC: 131 MG/DL (ref 70–99)
HCO3 ARTERIAL: 39.3 MMOL/L (ref 22–26)
HCO3 ARTERIAL: 42.1 MMOL/L (ref 22–26)
HCO3 ARTERIAL: 42.3 MMOL/L (ref 22–26)
HCT VFR BLD CALC: 46.9 % (ref 36–46)
HEMOGLOBIN: 15.1 G/DL (ref 12–16)
LACTIC ACID: 0.8 MMOL/L (ref 0.5–2.2)
LEGIONELLA PNEUMOPHILIA AG, URINE: NEGATIVE
LV EF: 55 %
LVEF MODALITY: NORMAL
MCH RBC QN AUTO: 29.9 PG (ref 26–34)
MCHC RBC AUTO-ENTMCNC: 32.2 G/DL (ref 31–37)
MCV RBC AUTO: 92.9 FL (ref 80–100)
METHEMOGLOBIN: 0.2 % (ref 0–1.9)
METHEMOGLOBIN: 0.9 % (ref 0–1.9)
MODE: ABNORMAL
MODE: ABNORMAL
O2 DEVICE/FLOW/%: ABNORMAL
O2 SAT, ARTERIAL: 91.4 % (ref 95–98)
O2 SAT, ARTERIAL: 92.6 % (ref 95–98)
O2 SAT, ARTERIAL: 96 % (ref 95–98)
PATIENT TEMP: 37.1
PCO2 ARTERIAL: 79.9 MMHG (ref 35–45)
PCO2 ARTERIAL: 81.2 MMHG (ref 35–45)
PCO2 ARTERIAL: 82.1 MMHG (ref 35–45)
PDW BLD-RTO: 13.9 % (ref 11.5–14.9)
PEEP/CPAP: 8
PH ARTERIAL: 7.3 (ref 7.35–7.45)
PH ARTERIAL: 7.32 (ref 7.35–7.45)
PH ARTERIAL: 7.33 (ref 7.35–7.45)
PLATELET # BLD: 409 K/UL (ref 150–450)
PMV BLD AUTO: 8.1 FL (ref 6–12)
PO2 ARTERIAL: 65.4 MMHG (ref 80–100)
PO2 ARTERIAL: 70.6 MMHG (ref 80–100)
PO2 ARTERIAL: 81.4 MMHG (ref 80–100)
POSITIVE BASE EXCESS, ART: 12.9 MMOL/L (ref 0–2)
POSITIVE BASE EXCESS, ART: 16 MMOL/L (ref 0–2)
POSITIVE BASE EXCESS, ART: 16.2 MMOL/L (ref 0–2)
POTASSIUM SERPL-SCNC: 5.2 MMOL/L (ref 3.7–5.3)
POTASSIUM SERPL-SCNC: 5.4 MMOL/L (ref 3.7–5.3)
PRO-BNP: 1874 PG/ML
PROCALCITONIN: 0.1 NG/ML
PSV: 16
PT. POSITION: ABNORMAL
RBC # BLD: 5.04 M/UL (ref 4–5.2)
REASON FOR REJECTION: NORMAL
RESPIRATORY RATE: 17
RESPIRATORY RATE: 20
SAMPLE SITE: ABNORMAL
SET RATE: 16
SODIUM BLD-SCNC: 130 MMOL/L (ref 135–144)
SOURCE: NORMAL
SPECIMEN DESCRIPTION: ABNORMAL
STREP PNEUMONIAE ANTIGEN: NEGATIVE
TEXT FOR RESPIRATORY: ABNORMAL
TOTAL RATE: 17
VT: 276
WBC # BLD: 21.8 K/UL (ref 3.5–11)
ZZ NTE CLEAN UP: ORDERED TEST: NORMAL
ZZ NTE WITH NAME CLEAN UP: SPECIMEN SOURCE: NORMAL

## 2022-11-10 PROCEDURE — 82805 BLOOD GASES W/O2 SATURATION: CPT

## 2022-11-10 PROCEDURE — 6370000000 HC RX 637 (ALT 250 FOR IP): Performed by: INTERNAL MEDICINE

## 2022-11-10 PROCEDURE — 2700000000 HC OXYGEN THERAPY PER DAY

## 2022-11-10 PROCEDURE — 84132 ASSAY OF SERUM POTASSIUM: CPT

## 2022-11-10 PROCEDURE — 6360000002 HC RX W HCPCS: Performed by: INTERNAL MEDICINE

## 2022-11-10 PROCEDURE — 83605 ASSAY OF LACTIC ACID: CPT

## 2022-11-10 PROCEDURE — 2580000003 HC RX 258: Performed by: INTERNAL MEDICINE

## 2022-11-10 PROCEDURE — 6370000000 HC RX 637 (ALT 250 FOR IP): Performed by: STUDENT IN AN ORGANIZED HEALTH CARE EDUCATION/TRAINING PROGRAM

## 2022-11-10 PROCEDURE — 36415 COLL VENOUS BLD VENIPUNCTURE: CPT

## 2022-11-10 PROCEDURE — 84145 PROCALCITONIN (PCT): CPT

## 2022-11-10 PROCEDURE — 83880 ASSAY OF NATRIURETIC PEPTIDE: CPT

## 2022-11-10 PROCEDURE — 6360000002 HC RX W HCPCS: Performed by: STUDENT IN AN ORGANIZED HEALTH CARE EDUCATION/TRAINING PROGRAM

## 2022-11-10 PROCEDURE — 2500000003 HC RX 250 WO HCPCS: Performed by: INTERNAL MEDICINE

## 2022-11-10 PROCEDURE — 85027 COMPLETE CBC AUTOMATED: CPT

## 2022-11-10 PROCEDURE — 6370000000 HC RX 637 (ALT 250 FOR IP): Performed by: EMERGENCY MEDICINE

## 2022-11-10 PROCEDURE — 94640 AIRWAY INHALATION TREATMENT: CPT

## 2022-11-10 PROCEDURE — 99233 SBSQ HOSP IP/OBS HIGH 50: CPT | Performed by: INTERNAL MEDICINE

## 2022-11-10 PROCEDURE — 99291 CRITICAL CARE FIRST HOUR: CPT | Performed by: INTERNAL MEDICINE

## 2022-11-10 PROCEDURE — 71045 X-RAY EXAM CHEST 1 VIEW: CPT

## 2022-11-10 PROCEDURE — 36600 WITHDRAWAL OF ARTERIAL BLOOD: CPT

## 2022-11-10 PROCEDURE — 2060000000 HC ICU INTERMEDIATE R&B

## 2022-11-10 PROCEDURE — 80048 BASIC METABOLIC PNL TOTAL CA: CPT

## 2022-11-10 PROCEDURE — 93306 TTE W/DOPPLER COMPLETE: CPT

## 2022-11-10 PROCEDURE — 94660 CPAP INITIATION&MGMT: CPT

## 2022-11-10 PROCEDURE — 87641 MR-STAPH DNA AMP PROBE: CPT

## 2022-11-10 PROCEDURE — 2580000003 HC RX 258: Performed by: NURSE PRACTITIONER

## 2022-11-10 RX ORDER — IPRATROPIUM BROMIDE AND ALBUTEROL SULFATE 2.5; .5 MG/3ML; MG/3ML
1 SOLUTION RESPIRATORY (INHALATION) EVERY 4 HOURS
Status: DISCONTINUED | OUTPATIENT
Start: 2022-11-10 | End: 2022-11-17 | Stop reason: HOSPADM

## 2022-11-10 RX ORDER — FUROSEMIDE 10 MG/ML
20 INJECTION INTRAMUSCULAR; INTRAVENOUS ONCE
Status: COMPLETED | OUTPATIENT
Start: 2022-11-10 | End: 2022-11-10

## 2022-11-10 RX ORDER — LISINOPRIL 20 MG/1
20 TABLET ORAL DAILY
Status: DISCONTINUED | OUTPATIENT
Start: 2022-11-11 | End: 2022-11-12

## 2022-11-10 RX ORDER — 0.9 % SODIUM CHLORIDE 0.9 %
1000 INTRAVENOUS SOLUTION INTRAVENOUS ONCE
Status: COMPLETED | OUTPATIENT
Start: 2022-11-10 | End: 2022-11-10

## 2022-11-10 RX ORDER — DOXYCYCLINE 100 MG/1
100 CAPSULE ORAL EVERY 12 HOURS SCHEDULED
Status: DISCONTINUED | OUTPATIENT
Start: 2022-11-10 | End: 2022-11-17 | Stop reason: HOSPADM

## 2022-11-10 RX ORDER — DEXMEDETOMIDINE HYDROCHLORIDE 4 UG/ML
.1-1.5 INJECTION, SOLUTION INTRAVENOUS CONTINUOUS
Status: DISCONTINUED | OUTPATIENT
Start: 2022-11-10 | End: 2022-11-14

## 2022-11-10 RX ADMIN — DOXYCYCLINE 100 MG: 100 CAPSULE ORAL at 19:49

## 2022-11-10 RX ADMIN — HYDRALAZINE HYDROCHLORIDE 5 MG: 20 INJECTION INTRAMUSCULAR; INTRAVENOUS at 02:00

## 2022-11-10 RX ADMIN — BENZONATATE 200 MG: 200 CAPSULE ORAL at 19:49

## 2022-11-10 RX ADMIN — IPRATROPIUM BROMIDE AND ALBUTEROL SULFATE 1 AMPULE: 2.5; .5 SOLUTION RESPIRATORY (INHALATION) at 19:33

## 2022-11-10 RX ADMIN — CEFEPIME 2000 MG: 2 INJECTION, POWDER, FOR SOLUTION INTRAVENOUS at 17:03

## 2022-11-10 RX ADMIN — ENOXAPARIN SODIUM 40 MG: 100 INJECTION SUBCUTANEOUS at 08:26

## 2022-11-10 RX ADMIN — IPRATROPIUM BROMIDE AND ALBUTEROL SULFATE 1 AMPULE: .5; 2.5 SOLUTION RESPIRATORY (INHALATION) at 07:44

## 2022-11-10 RX ADMIN — CEFEPIME 2000 MG: 2 INJECTION, POWDER, FOR SOLUTION INTRAVENOUS at 22:52

## 2022-11-10 RX ADMIN — METHYLPREDNISOLONE SODIUM SUCCINATE 40 MG: 40 INJECTION, POWDER, FOR SOLUTION INTRAMUSCULAR; INTRAVENOUS at 19:48

## 2022-11-10 RX ADMIN — METHYLPREDNISOLONE SODIUM SUCCINATE 40 MG: 40 INJECTION, POWDER, FOR SOLUTION INTRAMUSCULAR; INTRAVENOUS at 02:00

## 2022-11-10 RX ADMIN — Medication 60 MG: at 19:48

## 2022-11-10 RX ADMIN — DEXMEDETOMIDINE HYDROCHLORIDE 0.4 MCG/KG/HR: 400 INJECTION INTRAVENOUS at 12:12

## 2022-11-10 RX ADMIN — Medication 60 MG: at 08:26

## 2022-11-10 RX ADMIN — IPRATROPIUM BROMIDE AND ALBUTEROL SULFATE 1 AMPULE: 2.5; .5 SOLUTION RESPIRATORY (INHALATION) at 10:59

## 2022-11-10 RX ADMIN — CEFTRIAXONE SODIUM 1000 MG: 1 INJECTION, POWDER, FOR SOLUTION INTRAMUSCULAR; INTRAVENOUS at 08:42

## 2022-11-10 RX ADMIN — AZITHROMYCIN DIHYDRATE 500 MG: 500 INJECTION, POWDER, LYOPHILIZED, FOR SOLUTION INTRAVENOUS at 10:35

## 2022-11-10 RX ADMIN — ATORVASTATIN CALCIUM 40 MG: 40 TABLET, FILM COATED ORAL at 08:22

## 2022-11-10 RX ADMIN — METHYLPREDNISOLONE SODIUM SUCCINATE 40 MG: 40 INJECTION, POWDER, FOR SOLUTION INTRAMUSCULAR; INTRAVENOUS at 08:26

## 2022-11-10 RX ADMIN — SODIUM CHLORIDE, PRESERVATIVE FREE 10 ML: 5 INJECTION INTRAVENOUS at 08:44

## 2022-11-10 RX ADMIN — MONTELUKAST 10 MG: 10 TABLET, FILM COATED ORAL at 19:48

## 2022-11-10 RX ADMIN — METHYLPREDNISOLONE SODIUM SUCCINATE 40 MG: 40 INJECTION, POWDER, FOR SOLUTION INTRAMUSCULAR; INTRAVENOUS at 12:13

## 2022-11-10 RX ADMIN — LISINOPRIL 20 MG: 20 TABLET ORAL at 08:22

## 2022-11-10 RX ADMIN — BENZONATATE 200 MG: 200 CAPSULE ORAL at 08:22

## 2022-11-10 RX ADMIN — FUROSEMIDE 20 MG: 10 INJECTION, SOLUTION INTRAMUSCULAR; INTRAVENOUS at 10:46

## 2022-11-10 RX ADMIN — SODIUM CHLORIDE 1000 ML: 9 INJECTION, SOLUTION INTRAVENOUS at 05:47

## 2022-11-10 ASSESSMENT — ENCOUNTER SYMPTOMS
CONSTIPATION: 0
VOMITING: 0
STRIDOR: 0
RHINORRHEA: 0
ABDOMINAL DISTENTION: 0
DIARRHEA: 0
BACK PAIN: 0
COUGH: 1
WHEEZING: 0
SHORTNESS OF BREATH: 1
NAUSEA: 0

## 2022-11-10 ASSESSMENT — PAIN SCALES - GENERAL
PAINLEVEL_OUTOF10: 0

## 2022-11-10 NOTE — PLAN OF CARE
Problem: Discharge Planning  Goal: Discharge to home or other facility with appropriate resources  11/10/2022 1727 by Niranjan Chung RN  Outcome: Progressing  Flowsheets  Taken 11/10/2022 1615  Discharge to home or other facility with appropriate resources: Refer to discharge planning if patient needs post-hospital services based on physician order or complex needs related to functional status, cognitive ability or social support system  Taken 11/10/2022 1215  Discharge to home or other facility with appropriate resources: Refer to discharge planning if patient needs post-hospital services based on physician order or complex needs related to functional status, cognitive ability or social support system  Taken 11/10/2022 0815  Discharge to home or other facility with appropriate resources: Refer to discharge planning if patient needs post-hospital services based on physician order or complex needs related to functional status, cognitive ability or social support system     Problem: Pain  Goal: Verbalizes/displays adequate comfort level or baseline comfort level  11/10/2022 1727 by Niranjan Chung RN  Outcome: Progressing  Flowsheets  Taken 11/10/2022 1700  Verbalizes/displays adequate comfort level or baseline comfort level:   Assess pain using appropriate pain scale   Administer analgesics based on type and severity of pain and evaluate response   Encourage patient to monitor pain and request assistance  Taken 11/10/2022 1130  Verbalizes/displays adequate comfort level or baseline comfort level:   Encourage patient to monitor pain and request assistance   Assess pain using appropriate pain scale   Administer analgesics based on type and severity of pain and evaluate response  Taken 11/10/2022 0800  Verbalizes/displays adequate comfort level or baseline comfort level:   Encourage patient to monitor pain and request assistance   Assess pain using appropriate pain scale   Administer analgesics based on type and severity of pain and evaluate response     Problem: Respiratory - Adult  Goal: Achieves optimal ventilation and oxygenation  11/10/2022 1727 by Kathryn Gillis RN  Outcome: Progressing  Flowsheets  Taken 11/10/2022 1615  Achieves optimal ventilation and oxygenation:   Assess for changes in respiratory status   Oxygen supplementation based on oxygen saturation or arterial blood gases   Position to facilitate oxygenation and minimize respiratory effort   Assess for changes in mentation and behavior  Taken 11/10/2022 1215  Achieves optimal ventilation and oxygenation:   Oxygen supplementation based on oxygen saturation or arterial blood gases   Position to facilitate oxygenation and minimize respiratory effort   Assess for changes in mentation and behavior   Assess for changes in respiratory status  Taken 11/10/2022 0815  Achieves optimal ventilation and oxygenation:   Assess for changes in respiratory status   Assess for changes in mentation and behavior   Position to facilitate oxygenation and minimize respiratory effort   Oxygen supplementation based on oxygen saturation or arterial blood gases

## 2022-11-10 NOTE — FLOWSHEET NOTE
11/09/22 2000   Oxygen Therapy   SpO2 (!) 67 %   Pulse Oximetry Type Continuous   Patient oxygen at 67% good waveform due patient taking off nasal canula. Patient found on toilet cyanic without oxygen. Writer instructed patient to but nasal canula back in. Patient screams \" I got to pee, I had to pee\". Writer don isolation precautions to assist patient. Patient place back on salter NC at 8L. Oxygen saturation remain low, oxygen increased to 15L. Informed patient that oxygen must remain on at all time. Patient oxygen decreased to 10L salter with oxygen saturation at 89%. Bed alarm on at this time. Will continue to monitor.

## 2022-11-10 NOTE — PROGRESS NOTES
ICU Progress Note (Non-Vent)  [facility]    Patient - Catherine Martinez,  Age - 61 y.o.    - 1965      Room Number -    MRN -  [id number]   Acct # - [de-identified]  Date of Admission -  2022 11:16 AM    Events of Past 24 Hours     Patient had 1 episode of desaturation into the 60s yesterday due to removing her oxygen. Also had low blood pressures requiring midodrine and fluid bolus, as well as left upper arm pain. Today patient says her left arm pain has resolved and her dyspnea is somewhat better. Still on high flow humidified oxygen. Vitals    height is 4' 11\" (1.499 m) and weight is 128 lb 15.5 oz (58.5 kg). Her axillary temperature is 97.6 °F (36.4 °C). Her blood pressure is 160/74 (abnormal) and her pulse is 93. Her respiration is 25 and oxygen saturation is 92%.        Temperature Range: Temp: 97.6 °F (36.4 °C) Temp  Av.7 °F (36.5 °C)  Min: 97.3 °F (36.3 °C)  Max: 98.2 °F (36.8 °C)  BP Range:  Systolic (05LFT), CQH:689 , Min:74 , FWI:668     Diastolic (80KKA), BP, Min:39, Max:104    Pulse Range: Pulse  Av.2  Min: 85  Max: 116  Respiration Range: Resp  Av  Min: 13  Max: 34  Current Pulse Ox[de-identified]  SpO2: 92 %  24HR Pulse Ox Range:  SpO2  Av.5 %  Min: 67 %  Max: 100 %  Oxygen Amount and Delivery: O2 Flow Rate (L/min): 10 L/min    Wt Readings from Last 3 Encounters:   22 128 lb 15.5 oz (58.5 kg)   19 162 lb 14.7 oz (73.9 kg)   18 150 lb (68 kg)     I/O     Intake/Output Summary (Last 24 hours) at 11/10/2022 0724  Last data filed at 11/10/2022 0456  Gross per 24 hour   Intake 550 ml   Output 300 ml   Net 250 ml     DRAIN/TUBE OUTPUT       Invasive Lines   ICP PRESSURE RANGE  No data recorded  CVP PRESSURE RANGE  No data recorded      Medications      sodium chloride  1,000 mL IntraVENous Once    ipratropium-albuterol  1 ampule Inhalation Q4H WA    sodium chloride flush  5-40 mL IntraVENous 2 times per day    enoxaparin  40 mg SubCUTAneous Daily    atorvastatin  40 mg Oral Daily    lisinopril  20 mg Oral BID    montelukast  10 mg Oral Nightly    benzonatate  200 mg Oral TID    dextromethorphan  60 mg Oral 2 times per day    cefTRIAXone (ROCEPHIN) IV  1,000 mg IntraVENous Q24H    azithromycin  500 mg IntraVENous Q24H    nicotine  1 patch TransDERmal Daily    methylPREDNISolone  40 mg IntraVENous Q6H     hydrALAZINE, midodrine, sodium chloride flush, sodium chloride flush, sodium chloride, ondansetron **OR** ondansetron, polyethylene glycol, acetaminophen **OR** acetaminophen  IV Drips/Infusions   sodium chloride         Diet/Nutrition   ADULT DIET; Regular    Exam      Constitutional - Alert, arousable  General Appearance  ill appearing   HEENT -normocephalic, atraumatic. PERRLA  Lungs - Chest expands equally, poor air movement   Cardiovascular - Heart sounds are normal.  normal rate and rhythm regular, no murmur, gallop or rub. Abdomen - soft, nontender, nondistended, no masses or organomegaly  Neurologic - CN II-XII are grossly intact.  There are no focal motor deficits  Skin - no bruising or bleeding  Extremities - no cyanosis, clubbing or edema    Lab Results   CBC     Lab Results   Component Value Date/Time    WBC 21.8 11/10/2022 04:43 AM    RBC 5.04 11/10/2022 04:43 AM    HGB 15.1 11/10/2022 04:43 AM    HCT 46.9 11/10/2022 04:43 AM     11/10/2022 04:43 AM    MCV 92.9 11/10/2022 04:43 AM    MCH 29.9 11/10/2022 04:43 AM    MCHC 32.2 11/10/2022 04:43 AM    RDW 13.9 11/10/2022 04:43 AM    METASPCT 2 11/08/2022 11:32 AM    LYMPHOPCT 13 11/08/2022 11:32 AM    MONOPCT 7 11/08/2022 11:32 AM    MYELOPCT 1 12/26/2014 06:39 AM    BASOPCT 0 11/08/2022 11:32 AM    MONOSABS 1.29 11/08/2022 11:32 AM    LYMPHSABS 2.39 11/08/2022 11:32 AM    EOSABS 0.00 11/08/2022 11:32 AM    BASOSABS 0.00 11/08/2022 11:32 AM    DIFFTYPE NOT REPORTED 11/01/2019 05:24 AM       [facility]   Lab Results   Component Value Date/Time     11/10/2022 04:43 AM    K 5.4 11/10/2022 04:43 AM    CL 90 11/10/2022 04:43 AM    CO2 34 11/10/2022 04:43 AM    BUN 14 11/10/2022 04:43 AM    CREATININE 0.46 11/10/2022 04:43 AM    GLUCOSE 131 11/10/2022 04:43 AM       LFTS  Lab Results   Component Value Date/Time    ALKPHOS 44 11/01/2019 05:24 AM    ALT 12 11/01/2019 05:24 AM    AST 11 11/01/2019 05:24 AM    PROT 6.1 11/01/2019 05:24 AM    BILITOT 0.27 11/01/2019 05:24 AM    LABALBU 3.0 11/01/2019 05:24 AM       ABG ABGs:   Lab Results   Component Value Date/Time    PHART 7.358 11/08/2022 03:51 PM    PO2ART 58.0 11/08/2022 03:51 PM    JEI4TAH 61.8 11/08/2022 03:51 PM       Lab Results   Component Value Date/Time    MODE HFNC 30L 50% 11/08/2022 11:30 AM         INR  Recent Labs     11/08/22  1132   PROTIME 13.1   INR 1.0       APTT  Recent Labs     11/08/22  1132   APTT 30.0       Lactic Acid  No results found for: LACTA     BNP   No results for input(s): BNP in the last 72 hours. Cultures       Radiology     CXR   Increasing interstitial opacities    CT Scans    (See actual reports for details)      SYSTEMS ASSESSMENT  Acute hypoxic respiratory failure  Multifocal pneumonia-most likely atypical/adenovirus  Acute exacerbation COPD  Hyponatremia-most likely SIADH from underlying pulmonary issues  Chronic hypercapnic respiratory failure  History of tobacco abuse  Medical noncompliance    Neuro   Alert and oriented    Respiratory   Wean oxygen as tolerated.  Keep O2 sat > 88%  Continue antibiotics and solumedrol   Continue Duoneb and antitussives     Cardiovascular   Blood pressure fluctuations throughout the night     Gastrointestinal   Regular diet     Renal   Potassium 5.4 -> hemolyzed   Urine output good     Infectious Disease   + Adenovirus, on contact and droplet isolation   Legionella and resp culture pending, mycoplasma neg  On Rocephin and Azithro     Hematology/Oncology   Lovenox for DVT prophylaxis Endocrine   Blood glucose in acceptable range, not requiring insulin at this time     Social/Spiritual/DNR/Disposition/Other   Full code     Critical Care Time    min    Electronically signed by Efrain Cota MD on 11/10/2022 at 7:24 AM      Patient seen and examined independently by me. Above discussed and I agree with resident note except where indicated in the EMR revision history. Also see my additional comments and changes indicated by discrete font, text color, italics, and/or initials. Labs, cultures, and radiographs where available were reviewed. She looks worse today, more tachypneic, she did pull her oxygen off and had significant desaturation this morning. We will go ahead and check an echo and a BNP. I will in the interim we will also give her Lasix one-time.   We will also increase her aerosols to every 4 hours around-the-clock    I called her daughter and left message on the voicemail  Electronically signed by Mera Maldonado MD on 11/10/2022 at 10:15 AM

## 2022-11-10 NOTE — PROGRESS NOTES
Patient was having low bp was given a fluid bolus and midodrine around 5pm today. Patient bp dropped to mid 70's. Writer at bedside to assess BP left arm 96/64. BP in right arm 168 94. radial pulse on right are strong, left are moderate. Patient c/o of slight pain in the upper left arm. Manual BP verified Noemy Baca RN and by Benita Villagran. Dr. Saskia Somers notified of above. No new orders at this time. Will continue to monitor.

## 2022-11-10 NOTE — PLAN OF CARE
Problem: Discharge Planning  Goal: Discharge to home or other facility with appropriate resources  11/10/2022 0449 by Leatha Wynn RN  Outcome: Progressing  11/9/2022 1654 by Eldon Goins RN  Outcome: Progressing  Flowsheets  Taken 11/9/2022 1600  Discharge to home or other facility with appropriate resources: Refer to discharge planning if patient needs post-hospital services based on physician order or complex needs related to functional status, cognitive ability or social support system  Taken 11/9/2022 1230  Discharge to home or other facility with appropriate resources: Refer to discharge planning if patient needs post-hospital services based on physician order or complex needs related to functional status, cognitive ability or social support system  Taken 11/9/2022 0800  Discharge to home or other facility with appropriate resources: Refer to discharge planning if patient needs post-hospital services based on physician order or complex needs related to functional status, cognitive ability or social support system     Problem: Safety - Adult  Goal: Free from fall injury  11/10/2022 0449 by Leatha Wynn RN  Outcome: Progressing  11/9/2022 1654 by Eldon Goins RN  Outcome: Progressing     Problem: Pain  Goal: Verbalizes/displays adequate comfort level or baseline comfort level  11/10/2022 0449 by Leatha Wynn RN  Outcome: Progressing  11/9/2022 1654 by Eldon Goins RN  Outcome: Progressing  Flowsheets  Taken 11/9/2022 1600  Verbalizes/displays adequate comfort level or baseline comfort level: Encourage patient to monitor pain and request assistance  Taken 11/9/2022 1230  Verbalizes/displays adequate comfort level or baseline comfort level:   Encourage patient to monitor pain and request assistance   Assess pain using appropriate pain scale   Administer analgesics based on type and severity of pain and evaluate response  Taken 11/9/2022 0800  Verbalizes/displays adequate comfort level or baseline comfort level:   Encourage patient to monitor pain and request assistance   Assess pain using appropriate pain scale   Administer analgesics based on type and severity of pain and evaluate response     Problem: Respiratory - Adult  Goal: Achieves optimal ventilation and oxygenation  11/10/2022 0449 by Macho Garcia RN  Outcome: Progressing  11/9/2022 1654 by Alcira Cochran RN  Outcome: Progressing  Flowsheets  Taken 11/9/2022 1600  Achieves optimal ventilation and oxygenation:   Assess for changes in respiratory status   Assess for changes in mentation and behavior   Position to facilitate oxygenation and minimize respiratory effort   Oxygen supplementation based on oxygen saturation or arterial blood gases  Taken 11/9/2022 1230  Achieves optimal ventilation and oxygenation:   Assess for changes in respiratory status   Assess for changes in mentation and behavior   Position to facilitate oxygenation and minimize respiratory effort   Oxygen supplementation based on oxygen saturation or arterial blood gases  Taken 11/9/2022 0800  Achieves optimal ventilation and oxygenation:   Assess for changes in mentation and behavior   Assess for changes in respiratory status   Position to facilitate oxygenation and minimize respiratory effort   Oxygen supplementation based on oxygen saturation or arterial blood gases     Problem: Cardiovascular - Adult  Goal: Maintains optimal cardiac output and hemodynamic stability  11/10/2022 0449 by Macho Garcia RN  Outcome: Progressing  11/9/2022 1654 by Alcira Cochran RN  Outcome: Progressing  Flowsheets  Taken 11/9/2022 1600  Maintains optimal cardiac output and hemodynamic stability:   Monitor blood pressure and heart rate   Monitor urine output and notify Licensed Independent Practitioner for values outside of normal range  Taken 11/9/2022 1230  Maintains optimal cardiac output and hemodynamic stability:   Monitor blood pressure and heart rate   Monitor urine output and notify Licensed Independent Practitioner for values outside of normal range   Assess for signs of decreased cardiac output  Taken 11/9/2022 0800  Maintains optimal cardiac output and hemodynamic stability:   Monitor urine output and notify Licensed Independent Practitioner for values outside of normal range   Assess for signs of decreased cardiac output   Monitor blood pressure and heart rate  Goal: Absence of cardiac dysrhythmias or at baseline  11/10/2022 0449 by Elisa Dumas RN  Outcome: Progressing  11/9/2022 1654 by Denia Rodas RN  Outcome: Progressing  Flowsheets  Taken 11/9/2022 1600  Absence of cardiac dysrhythmias or at baseline: Assess for signs of decreased cardiac output  Taken 11/9/2022 1230  Absence of cardiac dysrhythmias or at baseline:   Monitor cardiac rate and rhythm   Assess for signs of decreased cardiac output  Taken 11/9/2022 0800  Absence of cardiac dysrhythmias or at baseline:   Monitor cardiac rate and rhythm   Assess for signs of decreased cardiac output     Problem: Skin/Tissue Integrity - Adult  Goal: Skin integrity remains intact  11/10/2022 0449 by Elisa Dumas RN  Outcome: Progressing  11/9/2022 1654 by Denia Rodas RN  Outcome: Progressing  Flowsheets  Taken 11/9/2022 1600  Skin Integrity Remains Intact:   Monitor for areas of redness and/or skin breakdown   Assess vascular access sites hourly  Taken 11/9/2022 1230  Skin Integrity Remains Intact:   Monitor for areas of redness and/or skin breakdown   Assess vascular access sites hourly   Every 4-6 hours minimum: Change oxygen saturation probe site  Taken 11/9/2022 0800  Skin Integrity Remains Intact: Assess vascular access sites hourly  Goal: Oral mucous membranes remain intact  11/10/2022 0449 by Elisa Dumas RN  Outcome: Progressing  11/9/2022 1654 by Denia Rodas RN  Outcome: Progressing  Flowsheets  Taken 11/9/2022 1600  Oral Mucous Membranes Remain Intact: Assess oral mucosa and hygiene practices   Implement preventative oral hygiene regimen  Taken 11/9/2022 1230  Oral Mucous Membranes Remain Intact:   Assess oral mucosa and hygiene practices   Implement preventative oral hygiene regimen  Taken 11/9/2022 0800  Oral Mucous Membranes Remain Intact:   Assess oral mucosa and hygiene practices   Implement preventative oral hygiene regimen     Problem: Death & Dying  Goal: Pt/Family communicate acceptance of impending death and feel psychological comfort and peace  11/10/2022 0449 by López Gutierrez RN  Outcome: Progressing  11/9/2022 1654 by Isabel Birmingham RN  Outcome: Progressing  Flowsheets  Taken 11/9/2022 1600  Patient/family communicates acceptance of loss or impending death and feels physical/psychological comfort and peace:   Assess patient/family anxiety and grief process related to end of life issues   Provide emotional and spiritual support   Provide information about the patients health status with consideration of family and cultural values  Taken 11/9/2022 1230  Patient/family communicates acceptance of loss or impending death and feels physical/psychological comfort and peace:   Assess patient/family anxiety and grief process related to end of life issues   Provide emotional and spiritual support   Provide information about the patients health status with consideration of family and cultural values  Taken 11/9/2022 0800  Patient/family communicates acceptance of loss or impending death and feels physical/psychological comfort and peace:   Assess patient/family anxiety and grief process related to end of life issues   Provide emotional and spiritual support   Provide information about the patients health status with consideration of family and cultural values     Problem: Decision Making  Goal: Pt/Family able to effectively weigh alternatives and participate in decision making related to treatment and care  11/10/2022 0449 by López Gutierrez RN  Outcome: Progressing  11/9/2022 1654 by Efra Sutton RN  Outcome: Progressing  Flowsheets  Taken 11/9/2022 1600  Patient/family able to effectively weigh alternatives and participate in decision making related to treatment and care:   Determine when there are differences between patient's view, family's view, and healthcare provider's view of condition   Facilitate patient and family articulation of goals for care  Taken 11/9/2022 1230  Patient/family able to effectively weigh alternatives and participate in decision making related to treatment and care:   Determine when there are differences between patient's view, family's view, and healthcare provider's view of condition   Facilitate patient and family articulation of goals for care   Help patient and family identify pros/cons of alternative solutions  Taken 11/9/2022 0800  Patient/family able to effectively weigh alternatives and participate in decision making related to treatment and care:    Facilitate patient and family articulation of goals for care   Help patient and family identify pros/cons of alternative solutions   Determine when there are differences between patient's view, family's view, and healthcare provider's view of condition     Problem: Infection - Adult  Goal: Absence of infection at discharge  Outcome: Progressing  Goal: Absence of fever/infection during anticipated neutropenic period  Outcome: Progressing     Problem: Skin/Tissue Integrity  Goal: Absence of new skin breakdown  Outcome: Progressing

## 2022-11-10 NOTE — CARE COORDINATION
ONGOING DISCHARGE PLAN:    Patient is very sleepy and on Bipap    Per chart notes, plan is to return home with no VNS. She will need a new nebulizer, will need DME order and face to face when ready to discharge so it can be ordered and delivered to her.    + adenovirus    Remains on IV solu medrol, Precedex gtt, Ix lasix, Iv Rocephin, Iv Zithromax. Will need Home oxygen eval prior to discharge. Will continue to follow for additional discharge needs.     Electronically signed by Baltazar French RN on 11/10/2022 at 12:58 PM

## 2022-11-10 NOTE — FLOWSHEET NOTE
11/10/22 0516   Treatment Team Notification   Reason for Communication Critical results; Evaluate   Type of Critical Result Laboratory   Critical Lab Result Type Electrolytes  (potassium 5.4)   Team Member Name Molly Jarad   Treatment Team Role Advanced Practice Nurse   Method of Communication Secure Message   Response Waiting for response   Notification Time [id number]   See new orders.

## 2022-11-10 NOTE — PROGRESS NOTES
[address]   OCCUPATIONAL THERAPY MISSED TREATMENT NOTE   INPATIENT   Date: 11/10/22  Patient Name: Kyleigh Henley       Room:   MRN: [id number]   Account #: [de-identified]    : 1965  (61 y.o.)  Gender: female   Referring Practitioner: Clarence Joaquin MD  Diagnosis: Acute respiratory failure             REASON FOR MISSED TREATMENT:  Hold treatment per Maryjo Ortiz RN request          Davy Horan THERON

## 2022-11-10 NOTE — PROGRESS NOTES
[facility]    PROGRESS NOTE             11/10/2022    7:31 AM    Name:   Mitch Pope  MRN:     [id number]     Acct:      [de-identified]   Room:   2005/2005-01  IP Day:  2  Admit Date:  11/8/2022 11:16 AM    PCP:  Janet Caballero DTR  Code Status:  Full Code    Subjective:     C/C:   Chief Complaint   Patient presents with    Dizziness    Cough    Shortness of Breath     Interval History Status: improved. The patient was seen and examined at the bedside. The patient had an episode of low BP to mid 70s. The patient was given a fluid bolus and midodrine. The patient complains of pain in upper left arm. BP left arm 96/64. BP right arm 168/94. Radial pulse on left is moderate. Duplex Upper extremity for left arm pending     She denies headache, dizziness, syncope, fever, chills, abdominal pain, nausea, vomiting, constipation, and diarrhea. Brief History:      The patient is a 61year old non- female with a history of COPD (not on home oxygen), smoking (60 pack-years), hypertension, and hyperlipidemia who presents with shortness of breath worsening over the past week. The patient endorses a cough productive of green phlegm that has also worsened over the past week. She denies headache, dizziness, syncope, fever, chills, abdominal pain, nausea, vomiting, constipation and diarrhea. In the ED, the patient was hypoxic  (Sp02 58% on room air), tachycardic (108), and tachypneic (34). ABGs showed hypercapnia (pC02 58.3) and hypoxia (pO2 63.4). The patient was placed on BiPAP, but did not tolerate it and was placed on high flow nasal cannula. The patient was on 30 L high flow with 50% Fi02 and saturating 92%. The patient was given one dose of Solumedrol 125 mg and one dose of Duoneb nebulizer. The patient was given a bolus of IV normal saline. Respiratory panel was positive for adenovirus. IV ceftriaxone was started.  Glucose was 158. Creatinine was 0.71. WBCs were elevated at 18.4. Troponin was elevated at 56; repeat 50. EKG showed sinus tachycardia with occasional PVCs, possible LA enlargement, right superior axis deviation, and incomplete right bundle branch block, RV hypertrophy, T wave abnormality (possible inferior and anterolateral ischemia). CT chest was negative for pulmonary embolism, but showed multifocal tree-in-bud and nodular opacities throughout both lungs likely representing recurrent aspiration or atypical mycobacterium infection; mild emphysema, bronchiectasis and scarring along medial right upper lobe. The patient was admitted for management of acute on chronic hypoxic, hypercapnic respiratory failure secondary to COPD exacerbation and multifocal pneumonia. Pulmonology and infectious disease were consulted     Review of Systems:     Limited due to patients somnolent state    Review of Systems   Constitutional:  Negative for activity change, chills, fatigue and fever. HENT:  Negative for congestion and rhinorrhea. Respiratory:  Positive for cough and shortness of breath. Negative for wheezing and stridor. Cardiovascular:  Negative for chest pain, palpitations and leg swelling. Gastrointestinal:  Negative for abdominal distention, constipation, diarrhea, nausea and vomiting. Endocrine: Negative for cold intolerance and heat intolerance. Genitourinary:  Negative for dysuria, frequency and urgency. Musculoskeletal:  Negative for back pain, myalgias and neck pain. Skin:  Negative for pallor and rash. Neurological:  Negative for dizziness, tremors, syncope, light-headedness and headaches. Psychiatric/Behavioral:  Negative for agitation and confusion. Medications:      Allergies:  No Known Allergies    Current Meds:   Scheduled Meds:    sodium chloride  1,000 mL IntraVENous Once    ipratropium-albuterol  1 ampule Inhalation Q4H WA    sodium chloride flush  5-40 mL IntraVENous 2 times per day enoxaparin  40 mg SubCUTAneous Daily    atorvastatin  40 mg Oral Daily    lisinopril  20 mg Oral BID    montelukast  10 mg Oral Nightly    benzonatate  200 mg Oral TID    dextromethorphan  60 mg Oral 2 times per day    cefTRIAXone (ROCEPHIN) IV  1,000 mg IntraVENous Q24H    azithromycin  500 mg IntraVENous Q24H    nicotine  1 patch TransDERmal Daily    methylPREDNISolone  40 mg IntraVENous Q6H     Continuous Infusions:    sodium chloride       PRN Meds: hydrALAZINE, midodrine, sodium chloride flush, sodium chloride flush, sodium chloride, ondansetron **OR** ondansetron, polyethylene glycol, acetaminophen **OR** acetaminophen    Data:     Past Medical History:   has a past medical history of Acute respiratory failure ([facility] [address] COPD (chronic obstructive pulmonary disease) ([facility] [address] Cough, Current every day smoker, Dyspnea, Heart murmur, Hypertension, and Shortness of breath. Social History:   reports that she has been smoking cigarettes. She has been smoking an average of .5 packs per day. She has never used smokeless tobacco. She reports that she does not drink alcohol and does not use drugs. Family History: History reviewed. No pertinent family history. Vitals:  BP (!) 160/74   Pulse 93   Temp 97.6 °F (36.4 °C) (Axillary)   Resp 25   Ht 4' 11\" (1.499 m)   Wt 128 lb 15.5 oz (58.5 kg)   SpO2 92%   BMI 26.05 kg/m²   Temp (24hrs), Av.7 °F (36.5 °C), Min:97.3 °F (36.3 °C), Max:98.2 °F (36.8 °C)    No results for input(s): POCGLU in the last 72 hours. I/O(24Hr):     Intake/Output Summary (Last 24 hours) at 11/10/2022 0731  Last data filed at 11/10/2022 0456  Gross per 24 hour   Intake 550 ml   Output 300 ml   Net 250 ml       Labs:  [unfilled]    Lab Results   Component Value Date/Time    SPECIAL NOT REPORTED 10/28/2019 07:58 PM     Lab Results   Component Value Date/Time    CULTURE PENDING 2022 10:25 PM       [unfilled]    Radiology:    XR CHEST PORTABLE    Result Date: 2022  EXAMINATION: ONE XRAY VIEW OF THE CHEST 11/8/2022 8:37 am COMPARISON: 10/19/2019 HISTORY: ORDERING SYSTEM PROVIDED HISTORY: sob TECHNOLOGIST PROVIDED HISTORY: sob Reason for Exam: sob FINDINGS: Cardial pericardial silhouette is prominent but stable. Increased interstitial opacities noted bilaterally. Focal infiltrate is not identified. No pneumothorax. No free air. No acute bony abnormality. Increased interstitial opacity. While much or all of this may be chronic, please correlate with any clinical evidence of acute interstitial edema. CT CHEST PULMONARY EMBOLISM W CONTRAST    Result Date: 11/8/2022  EXAMINATION: CTA OF THE CHEST 11/8/2022 12:44 pm TECHNIQUE: CTA of the chest was performed after the administration of intravenous contrast.  Multiplanar reformatted images are provided for review. MIP images are provided for review. Automated exposure control, iterative reconstruction, and/or weight based adjustment of the mA/kV was utilized to reduce the radiation dose to as low as reasonably achievable. COMPARISON: 10/31/2019 HISTORY: ORDERING SYSTEM PROVIDED HISTORY: sob tachycardia TECHNOLOGIST PROVIDED HISTORY: sob tachycardia Decision Support Exception - unselect if not a suspected or confirmed emergency medical condition->Emergency Medical Condition (MA) Reason for Exam: sob, COPD 26-year-old female with shortness of breath, COPD, tachycardia FINDINGS: Pulmonary Arteries: No obvious filling defect in the pulmonary arterial vasculature that meets the criteria for pulmonary embolus within the limitations of this study. Evaluation of the pulmonary arterial vasculature is limited due to streak artifact from the contrast bolus in the SVC, suboptimal bolus timing, and respiratory motion. Mediastinum: Visualized thyroid gland grossly unremarkable in appearance. Atheromatous plaque and atherosclerotic calcification of the aorta. Coronary artery disease. Bilateral hilar lymph node enlargement.   No axillary lymphadenopathy. No mediastinal lymphadenopathy. No dissection flap within the visualized thoracic aorta. No pericardial or pleural effusions. No periaortic or mediastinal hemorrhage. Lungs/pleura: Trachea and proximal central airways appear patent. Respiratory motion throughout both lungs. Mild emphysema. Bronchiectasis and scarring along the medial right upper lobe. Tree-in-bud and nodular opacities throughout the bilateral upper lobes. Similar findings are seen within the right middle lobe, lingula, and superior segments of the lower lobes as well as the anteroinferior lower lobes. Upper Abdomen: Fatty liver. Atheromatous plaque and atherosclerotic calcification of the upper abdominal aorta and branch vasculature. Evaluation of the upper abdomen is limited due to respiratory motion. Soft Tissues/Bones: Mild diffuse degenerative changes throughout the spine. 1. No clear evidence for central pulmonary embolus within the limitations of this study. 2. Multifocal tree-in-bud and nodular opacities throughout both lungs as detailed above likely representing sequela of recurrent aspiration or atypical mycobacterial infection. Follow-up is recommended to document resolution. 3. Mild emphysema. Respiratory motion. Bronchiectasis and scarring along the medial right upper lobe. 4. Atheromatous plaque and atherosclerotic calcification of the aorta. Coronary artery disease. 5. Bilateral hilar lymph node enlargement, likely reactive. 6. Fatty liver. Physical Examination:        Physical Exam  Constitutional:       General: She is not in acute distress. Appearance: Normal appearance. HENT:      Head: Normocephalic and atraumatic. Nose: No congestion or rhinorrhea. Eyes:      Extraocular Movements: Extraocular movements intact. Conjunctiva/sclera: Conjunctivae normal.      Pupils: Pupils are equal, round, and reactive to light.    Cardiovascular:      Rate and Rhythm: Normal rate and regular rhythm. Pulses: Normal pulses. Heart sounds: Normal heart sounds. No murmur heard. No friction rub. No gallop. Pulmonary:      Effort: No respiratory distress. Breath sounds: No wheezing, rhonchi or rales. Abdominal:      General: Abdomen is flat. Bowel sounds are normal. There is no distension. Tenderness: There is no abdominal tenderness. There is no guarding. Musculoskeletal:      Right lower leg: No edema. Left lower leg: No edema. Skin:     Capillary Refill: Capillary refill takes less than 2 seconds. Coloration: Skin is not jaundiced or pale. Neurological:      General: No focal deficit present. Mental Status: She is alert and oriented to person, place, and time. Sensory: No sensory deficit. Motor: No weakness. Psychiatric:         Mood and Affect: Mood normal.         Assessment:        Primary Problem  Acute respiratory failure [facility])    Active Hospital Problems    Diagnosis Date Noted    Ac[facility] [J96.00] 11/08/2022     Priority: Medium       Plan:         Acute on chronic hypoxic, hypercapnic respiratory failure 2/2 COPD exacerbation and multifocal pneumonia r/o TB  -ABGs: pH 7.366, pC02 58.3, p02 63.4; HC03 33.3  -Patient was on 40L high flow cannula; Currently on saturating 95% 8L (goal oxygen saturation > 88%)  -CXR: increased interstitial opacity (most likely chronic)  -CT PE: multifocal tree-in-bud and nodular opacities throughout both lungs likely representing recurrent aspiration or atypical mycobacterium infection; mild emphysema, bronchiectasis and scarring along medial right upper lobe  -WBCs on admission 18.4; today: 21.8  -Respiratory viral panel: positive for adenovirus  -Respiratory culture pending  -Legionella pending, S.  Pneumoniae negative, Mycoplasma negative  -QuantiFERON gold test ordered  -Continue Solu-Medrol 40 mg IV q 6 hours  -Continue Azithromycin 500 mg IV q 24 hours  -Continue Ceftriaxone 1000 mg IV q 24 hours   -Continue DuoNeb aerols  -Continue anti-tussives: Benzozonate, Delsym   -Continue Singulair 10 mg po nightly   -Pulmonary consulted  -ID consulted     Hyponatremia (likely SIADH)  -Na+ on admission 130; Na+ today 130   -Continue IV normal saline  -Continue to monitor      Hyperkalemia  -K+ today 5.4   -Continue to monitor     Hyperlipidemia  -Continue Lipitor 40 mg po daily      Hypertension  -BP today 179/145  -Continue Hydralazine 5 mg IV q 6 hours PRN   -Continue Lisinopril 20 mg po BID   -Midodrine 5 mg po BID PRN if MAP < 65     Contact and droplet isolation   DVT prophylaxis: Lovenox 40 mg SC daily   Code: Full code  Diet: Regular adult diet   PT/OT/SW consulted    Sherri Sweet MD  11/10/2022  7:31 AM        I have discussed the care of Megan Aleman , including pertinent history and exam findings,    today with the resident. I have seen and examined the patient and the key elements of all parts of the encounter have been performed by me . I agree with the assessment, plan and orders as documented by the resident. Principal Problem:    Acute respiratory failure ([address]  Resolved Problems:    * No resolved hospital problems. *        Overall  course ;                                   show no change over time.         Patient, clinically very sleepy  Although following commands  Moving all 4 extremities  Ordering stat ABG  Patient did not receive any sedatives  Patient has weak pulses in left arm  Suspecting possible left subclavian artery stenosis  Will reach out to radiology,'s patient had recent CTA chest to look for pulmonary embolism, if they cannot comment on left subclavian artery, likely need to repeat CTA chest  May need vascular surgery inputs  Patient critically sick  CC time 32 minutes          Electronically signed by Ciera Damon MD

## 2022-11-10 NOTE — PROGRESS NOTES
Repeat ABG reported to Dr. Monie Su. Orders received for BIPAP and Precedex gtt. If no improvement possible intubation.      Repeat ABG to be done at 1348

## 2022-11-10 NOTE — PROGRESS NOTES
[facility] -   Infectious diseases evaluation  admission date 11/8/2022    reason for consultation:   Atypical pneumonia    Impression :   Current:  Adenovirus respiratory infection with possible superimposed bacterial infection. Acute COPD exacerbation  Hyponatremia  Chronic hypercapnic respiratory failure  History of smoking    Recommendations   QuantiFERON-TB test pending  Discontinue ceftriaxone and Zithromax   IV cefepime and doxycycline  Nasal swab for MRSA  Procalcitonin level and lactic acid  Mycoplasma IgM pending  Legionella urine antigen negative  Normal valdez growth on respiratory culture  HIV screen negative  Respiratory panel was positive for adenovirus PCR  Discussed with nursing staff  Continue supportive care    Infection Control Recommendations   [place] Precautions  Droplet Isolation      Antimicrobial Stewardship Recommendations   Simplification of therapy  Targeted therapy      History of Present Illness:   Initial history:  Tony Peng is a 61y.o.-year-old female with history of smoking, COPD, chronic hypercapnic respiratory failure, noncompliant with home medication. She presented to hospital with worsening shortness of breath associated with cough productive of green phlegm for several days, symptoms moderate to severe, was placed initially on BiPAP that she could not tolerate then was placed on high flow per nasal cannula. CT chest was negative for pulmonary embolism but showed multiple reticular nodule infiltrates in the upper lobes right more than left. Respiratory panel was positive for adenovirus PCR. The patient denied sick contact, no recent travel, denied hemoptysis, no chronic fatigue or night sweats. She is actively smoking, denied alcohol or drug abuse. Interval changes  11/10/2022   She is worse today, had episode of hypotension and desaturation, was placed on BiPAP and Precedex, afebrile.   She had left arm pain earlier today resolved. WB increased to 21.8 on steroids. Patient Vitals for the past 8 hrs:   BP Temp Temp src Pulse Resp SpO2   11/10/22 1215 -- -- -- 90 -- --   11/10/22 1205 (!) 144/84 -- -- 92 21 90 %   11/10/22 1200 (!) 142/84 -- -- 94 24 92 %   11/10/22 1130 (!) 176/129 97.7 °F (36.5 °C) Axillary 94 24 96 %   11/10/22 1059 -- -- -- (!) 112 24 97 %   11/10/22 0815 -- -- -- 94 18 94 %   11/10/22 0800 (!) 150/70 97.7 °F (36.5 °C) Oral (!) 102 24 --   11/10/22 0744 (!) 150/60 -- -- 95 -- 95 %   11/10/22 0700 (!) 179/145 -- -- (!) 105 20 90 %               I have personally reviewed the past medical history, past surgical history, medications, social history, and family history, and I haveupdated the database accordingly. Allergies:   Patient has no known allergies. Review of Systems:     Review of Systems  Drowsy, on Precedex   Physical Examination :       Physical Exam  Constitutional:       Appearance: She is ill-appearing. HENT:      Right Ear: External ear normal.      Left Ear: External ear normal.      Mouth/Throat:      Pharynx: No oropharyngeal exudate. Eyes:      General: No scleral icterus. Conjunctiva/sclera: Conjunctivae normal.   Cardiovascular:      Rate and Rhythm: Normal rate and regular rhythm. Pulmonary:      Effort: Pulmonary effort is normal.      Breath sounds: Wheezing and rhonchi present. Abdominal:      General: There is no distension. Palpations: Abdomen is soft. Tenderness: There is no abdominal tenderness. Musculoskeletal:      Cervical back: Neck supple. No rigidity. Right lower leg: No edema. Left lower leg: No edema. Skin:     General: Skin is warm. Coloration: Skin is not jaundiced. Neurological:      Mental Status: She is alert.        Past Medical History:     Past Medical History:   Diagnosis Date    Acute respiratory failure ([facility] [address]     due to COPD exacerbation    COPD (chronic obstructive pulmonary disease) ([facility] [address]     with hypoxia Cough     Current every day smoker     Dyspnea     Heart murmur     as a child    Hypertension     Shortness of breath        Past Surgical  History:     Past Surgical History:   Procedure Laterality Date     SECTION         Medications:      ipratropium-albuterol  1 ampule Inhalation Q4H    [START ON 2022] lisinopril  20 mg Oral Daily    sodium chloride flush  5-40 mL IntraVENous 2 times per day    enoxaparin  40 mg SubCUTAneous Daily    atorvastatin  40 mg Oral Daily    montelukast  10 mg Oral Nightly    benzonatate  200 mg Oral TID    dextromethorphan  60 mg Oral 2 times per day    cefTRIAXone (ROCEPHIN) IV  1,000 mg IntraVENous Q24H    azithromycin  500 mg IntraVENous Q24H    nicotine  1 patch TransDERmal Daily    methylPREDNISolone  40 mg IntraVENous Q6H       Social History:     Social History     Socioeconomic History    Marital status: Legally      Spouse name: Not on file    Number of children: Not on file    Years of education: Not on file    Highest education level: Not on file   Occupational History    Not on file   Tobacco Use    Smoking status: Every Day     Packs/day: 0.50     Types: Cigarettes    Smokeless tobacco: Never   Vaping Use    Vaping Use: Some days   Substance and Sexual Activity    Alcohol use: No    Drug use: No    Sexual activity: Not on file   Other Topics Concern    Not on file   Social History Narrative    Not on file     Social Determinants of Health     Financial Resource Strain: Not on file   Food Insecurity: Not on file   Transportation Needs: Not on file   Physical Activity: Not on file   Stress: Not on file   Social Connections: Not on file   Intimate Partner Violence: Not on file   Housing Stability: Not on file       Family History:   History reviewed. No pertinent family history.    Medical Decision Making:   I have independently reviewed/ordered the following labs:    CBC with Differential:   Recent Labs     22  1132 22  0415 11/10/22  0447 WBC 18.4* 21.0* 21.8*   HGB 15.9 15.2 15.1   HCT 49.1* 46.5* 46.9*   * 468* 409   LYMPHOPCT 13*  --   --    MONOPCT 7  --   --        BMP:  Recent Labs     11/08/22  1132 11/09/22  0415 11/10/22  0443 11/10/22  0936   * 129* 130*  --    K 4.5 4.8 5.4* 5.2   CL 87* 88* 90*  --    CO2 30 31 34*  --    BUN 15 14 14  --    CREATININE 0.71 0.50 0.46*  --    MG 2.3  --   --   --        Hepatic Function Panel: No results for input(s): PROT, LABALBU, BILIDIR, IBILI, BILITOT, ALKPHOS, ALT, AST in the last 72 hours. No results for input(s): RPR in the last 72 hours. No results for input(s): HIV in the last 72 hours. No results for input(s): BC in the last 72 hours. Lab Results   Component Value Date/Time    CREATININE 0.46 11/10/2022 04:43 AM    GLUCOSE 131 11/10/2022 04:43 AM       Detailed results: Thank you for allowing us to participate in the care of this patient. Please call with questions. This note is created with the assistance of a speech recognition program.  While intending to generate adocument that actually reflects the content of the visit, the document can still have some errors including those of syntax and sound a like substitutions which may escape proof reading. It such instances, actual meaningcan be extrapolated by contextual diversion.     Jose Kimball MD  Office: [phone]  Perfect serve / office [phone]

## 2022-11-11 LAB
ALBUMIN SERPL-MCNC: 3.4 G/DL (ref 3.5–5.2)
ANION GAP SERPL CALCULATED.3IONS-SCNC: 9 MMOL/L (ref 9–17)
BUN BLDV-MCNC: 21 MG/DL (ref 6–20)
CALCIUM SERPL-MCNC: 9.5 MG/DL (ref 8.6–10.4)
CHLORIDE BLD-SCNC: 92 MMOL/L (ref 98–107)
CO2: 36 MMOL/L (ref 20–31)
CREAT SERPL-MCNC: 0.49 MG/DL (ref 0.5–0.9)
GFR SERPL CREATININE-BSD FRML MDRD: >60 ML/MIN/1.73M2
GLUCOSE BLD-MCNC: 135 MG/DL (ref 65–105)
GLUCOSE BLD-MCNC: 141 MG/DL (ref 70–99)
HCT VFR BLD CALC: 48.2 % (ref 36–46)
HEMOGLOBIN: 15.5 G/DL (ref 12–16)
MAGNESIUM: 2.5 MG/DL (ref 1.6–2.6)
MCH RBC QN AUTO: 29.7 PG (ref 26–34)
MCHC RBC AUTO-ENTMCNC: 32.2 G/DL (ref 31–37)
MCV RBC AUTO: 92.5 FL (ref 80–100)
MRSA, DNA, NASAL: NEGATIVE
PDW BLD-RTO: 14 % (ref 11.5–14.9)
PHOSPHORUS: 1.4 MG/DL (ref 2.6–4.5)
PLATELET # BLD: 432 K/UL (ref 150–450)
PMV BLD AUTO: 8.2 FL (ref 6–12)
POTASSIUM SERPL-SCNC: 4.8 MMOL/L (ref 3.7–5.3)
PREALBUMIN: 18 MG/DL (ref 20–40)
QUANTI TB GOLD PLUS: NEGATIVE
QUANTI TB1 MINUS NIL: 0 IU/ML (ref 0–0.34)
QUANTI TB2 MINUS NIL: 0 IU/ML (ref 0–0.34)
QUANTIFERON MITOGEN: 0.8 IU/ML
QUANTIFERON NIL: 0.01 IU/ML
RBC # BLD: 5.22 M/UL (ref 4–5.2)
REASON FOR REJECTION: NORMAL
SODIUM BLD-SCNC: 137 MMOL/L (ref 135–144)
SPECIMEN DESCRIPTION: NORMAL
TRIGL SERPL-MCNC: 248 MG/DL
WBC # BLD: 17.9 K/UL (ref 3.5–11)
ZZ NTE CLEAN UP: ORDERED TEST: NORMAL
ZZ NTE WITH NAME CLEAN UP: SPECIMEN SOURCE: NORMAL

## 2022-11-11 PROCEDURE — 6360000002 HC RX W HCPCS: Performed by: INTERNAL MEDICINE

## 2022-11-11 PROCEDURE — 2500000003 HC RX 250 WO HCPCS

## 2022-11-11 PROCEDURE — 2580000003 HC RX 258: Performed by: INTERNAL MEDICINE

## 2022-11-11 PROCEDURE — 2500000003 HC RX 250 WO HCPCS: Performed by: INTERNAL MEDICINE

## 2022-11-11 PROCEDURE — 84478 ASSAY OF TRIGLYCERIDES: CPT

## 2022-11-11 PROCEDURE — 94761 N-INVAS EAR/PLS OXIMETRY MLT: CPT

## 2022-11-11 PROCEDURE — 36415 COLL VENOUS BLD VENIPUNCTURE: CPT

## 2022-11-11 PROCEDURE — 2700000000 HC OXYGEN THERAPY PER DAY

## 2022-11-11 PROCEDURE — 6360000002 HC RX W HCPCS: Performed by: STUDENT IN AN ORGANIZED HEALTH CARE EDUCATION/TRAINING PROGRAM

## 2022-11-11 PROCEDURE — 94640 AIRWAY INHALATION TREATMENT: CPT

## 2022-11-11 PROCEDURE — 6360000002 HC RX W HCPCS: Performed by: NURSE PRACTITIONER

## 2022-11-11 PROCEDURE — 94660 CPAP INITIATION&MGMT: CPT

## 2022-11-11 PROCEDURE — 99232 SBSQ HOSP IP/OBS MODERATE 35: CPT | Performed by: INTERNAL MEDICINE

## 2022-11-11 PROCEDURE — 84100 ASSAY OF PHOSPHORUS: CPT

## 2022-11-11 PROCEDURE — 85027 COMPLETE CBC AUTOMATED: CPT

## 2022-11-11 PROCEDURE — 99291 CRITICAL CARE FIRST HOUR: CPT | Performed by: INTERNAL MEDICINE

## 2022-11-11 PROCEDURE — 82947 ASSAY GLUCOSE BLOOD QUANT: CPT

## 2022-11-11 PROCEDURE — 2000000000 HC ICU R&B

## 2022-11-11 PROCEDURE — 6370000000 HC RX 637 (ALT 250 FOR IP): Performed by: INTERNAL MEDICINE

## 2022-11-11 PROCEDURE — 84134 ASSAY OF PREALBUMIN: CPT

## 2022-11-11 PROCEDURE — 82040 ASSAY OF SERUM ALBUMIN: CPT

## 2022-11-11 PROCEDURE — 2580000003 HC RX 258

## 2022-11-11 PROCEDURE — 80048 BASIC METABOLIC PNL TOTAL CA: CPT

## 2022-11-11 PROCEDURE — 83735 ASSAY OF MAGNESIUM: CPT

## 2022-11-11 RX ORDER — METOPROLOL TARTRATE 5 MG/5ML
5 INJECTION INTRAVENOUS EVERY 6 HOURS PRN
Status: DISCONTINUED | OUTPATIENT
Start: 2022-11-11 | End: 2022-11-11

## 2022-11-11 RX ORDER — HYDRALAZINE HYDROCHLORIDE 20 MG/ML
10 INJECTION INTRAMUSCULAR; INTRAVENOUS EVERY 6 HOURS
Status: DISCONTINUED | OUTPATIENT
Start: 2022-11-11 | End: 2022-11-14

## 2022-11-11 RX ORDER — INSULIN LISPRO 100 [IU]/ML
0-4 INJECTION, SOLUTION INTRAVENOUS; SUBCUTANEOUS EVERY 6 HOURS
Status: DISCONTINUED | OUTPATIENT
Start: 2022-11-11 | End: 2022-11-13

## 2022-11-11 RX ORDER — HYDRALAZINE HYDROCHLORIDE 20 MG/ML
5 INJECTION INTRAMUSCULAR; INTRAVENOUS ONCE
Status: COMPLETED | OUTPATIENT
Start: 2022-11-11 | End: 2022-11-11

## 2022-11-11 RX ORDER — DEXTROSE MONOHYDRATE 100 MG/ML
INJECTION, SOLUTION INTRAVENOUS CONTINUOUS PRN
Status: DISCONTINUED | OUTPATIENT
Start: 2022-11-11 | End: 2022-11-17 | Stop reason: HOSPADM

## 2022-11-11 RX ORDER — HYDRALAZINE HYDROCHLORIDE 20 MG/ML
10 INJECTION INTRAMUSCULAR; INTRAVENOUS EVERY 6 HOURS PRN
Status: DISCONTINUED | OUTPATIENT
Start: 2022-11-11 | End: 2022-11-17 | Stop reason: HOSPADM

## 2022-11-11 RX ORDER — INSULIN LISPRO 100 [IU]/ML
0-4 INJECTION, SOLUTION INTRAVENOUS; SUBCUTANEOUS NIGHTLY
Status: DISCONTINUED | OUTPATIENT
Start: 2022-11-11 | End: 2022-11-11 | Stop reason: DRUGHIGH

## 2022-11-11 RX ORDER — METOPROLOL TARTRATE 5 MG/5ML
5 INJECTION INTRAVENOUS EVERY 6 HOURS PRN
Status: DISCONTINUED | OUTPATIENT
Start: 2022-11-11 | End: 2022-11-14

## 2022-11-11 RX ADMIN — SODIUM CHLORIDE, PRESERVATIVE FREE 10 ML: 5 INJECTION INTRAVENOUS at 21:44

## 2022-11-11 RX ADMIN — I.V. FAT EMULSION 100 ML: 20 EMULSION INTRAVENOUS at 17:12

## 2022-11-11 RX ADMIN — SODIUM PHOSPHATE, MONOBASIC, MONOHYDRATE AND SODIUM PHOSPHATE, DIBASIC, ANHYDROUS: 142; 276 INJECTION, SOLUTION INTRAVENOUS at 17:12

## 2022-11-11 RX ADMIN — HYDRALAZINE HYDROCHLORIDE 10 MG: 20 INJECTION INTRAMUSCULAR; INTRAVENOUS at 10:03

## 2022-11-11 RX ADMIN — HYDRALAZINE HYDROCHLORIDE 10 MG: 20 INJECTION INTRAMUSCULAR; INTRAVENOUS at 23:00

## 2022-11-11 RX ADMIN — METOPROLOL TARTRATE 5 MG: 5 INJECTION, SOLUTION INTRAVENOUS at 14:08

## 2022-11-11 RX ADMIN — HYDRALAZINE HYDROCHLORIDE 5 MG: 20 INJECTION INTRAMUSCULAR; INTRAVENOUS at 02:37

## 2022-11-11 RX ADMIN — HYDRALAZINE HYDROCHLORIDE 10 MG: 20 INJECTION INTRAMUSCULAR; INTRAVENOUS at 16:16

## 2022-11-11 RX ADMIN — METHYLPREDNISOLONE SODIUM SUCCINATE 40 MG: 40 INJECTION, POWDER, FOR SOLUTION INTRAMUSCULAR; INTRAVENOUS at 02:36

## 2022-11-11 RX ADMIN — METOPROLOL TARTRATE 5 MG: 5 INJECTION, SOLUTION INTRAVENOUS at 21:44

## 2022-11-11 RX ADMIN — IPRATROPIUM BROMIDE AND ALBUTEROL SULFATE 1 AMPULE: 2.5; .5 SOLUTION RESPIRATORY (INHALATION) at 03:48

## 2022-11-11 RX ADMIN — SODIUM PHOSPHATE, MONOBASIC, MONOHYDRATE AND SODIUM PHOSPHATE, DIBASIC, ANHYDROUS 20 MMOL: 142; 276 INJECTION, SOLUTION INTRAVENOUS at 15:01

## 2022-11-11 RX ADMIN — CEFEPIME 2000 MG: 2 INJECTION, POWDER, FOR SOLUTION INTRAVENOUS at 14:54

## 2022-11-11 RX ADMIN — CEFEPIME 2000 MG: 2 INJECTION, POWDER, FOR SOLUTION INTRAVENOUS at 06:32

## 2022-11-11 RX ADMIN — HYDRALAZINE HYDROCHLORIDE 5 MG: 20 INJECTION INTRAMUSCULAR; INTRAVENOUS at 04:59

## 2022-11-11 RX ADMIN — METHYLPREDNISOLONE SODIUM SUCCINATE 40 MG: 40 INJECTION, POWDER, FOR SOLUTION INTRAMUSCULAR; INTRAVENOUS at 06:33

## 2022-11-11 RX ADMIN — IPRATROPIUM BROMIDE AND ALBUTEROL SULFATE 1 AMPULE: 2.5; .5 SOLUTION RESPIRATORY (INHALATION) at 15:29

## 2022-11-11 RX ADMIN — DEXMEDETOMIDINE HYDROCHLORIDE 0.3 MCG/KG/HR: 400 INJECTION INTRAVENOUS at 05:09

## 2022-11-11 RX ADMIN — IPRATROPIUM BROMIDE AND ALBUTEROL SULFATE 1 AMPULE: 2.5; .5 SOLUTION RESPIRATORY (INHALATION) at 08:07

## 2022-11-11 RX ADMIN — ENOXAPARIN SODIUM 40 MG: 100 INJECTION SUBCUTANEOUS at 07:53

## 2022-11-11 RX ADMIN — IPRATROPIUM BROMIDE AND ALBUTEROL SULFATE 1 AMPULE: 2.5; .5 SOLUTION RESPIRATORY (INHALATION) at 19:50

## 2022-11-11 RX ADMIN — METHYLPREDNISOLONE SODIUM SUCCINATE 40 MG: 40 INJECTION, POWDER, FOR SOLUTION INTRAMUSCULAR; INTRAVENOUS at 12:42

## 2022-11-11 RX ADMIN — CEFEPIME 2000 MG: 2 INJECTION, POWDER, FOR SOLUTION INTRAVENOUS at 22:47

## 2022-11-11 RX ADMIN — IPRATROPIUM BROMIDE AND ALBUTEROL SULFATE 1 AMPULE: 2.5; .5 SOLUTION RESPIRATORY (INHALATION) at 00:22

## 2022-11-11 RX ADMIN — IPRATROPIUM BROMIDE AND ALBUTEROL SULFATE 1 AMPULE: 2.5; .5 SOLUTION RESPIRATORY (INHALATION) at 11:23

## 2022-11-11 RX ADMIN — METHYLPREDNISOLONE SODIUM SUCCINATE 40 MG: 40 INJECTION, POWDER, FOR SOLUTION INTRAMUSCULAR; INTRAVENOUS at 18:36

## 2022-11-11 ASSESSMENT — PAIN SCALES - WONG BAKER: WONGBAKER_NUMERICALRESPONSE: 0

## 2022-11-11 ASSESSMENT — PAIN SCALES - GENERAL
PAINLEVEL_OUTOF10: 0

## 2022-11-11 NOTE — PROGRESS NOTES
BRONCHOSPASM/BRONCHOCONSTRICTION     [x]         IMPROVE AERATION/BREATH SOUNDS  [x]   ADMINISTER BRONCHODILATOR THERAPY AS APPROPRIATE  [x]   ASSESS BREATH SOUNDS  []   IMPLEMENT AEROSOL/MDI PROTOCOL  [x]   PATIENT EDUCATION AS NEEDED   PROVIDE ADEQUATE OXYGENATION WITH ACCEPTABLE SP02/ABG'S    [x]  IDENTIFY APPROPRIATE OXYGEN THERAPY  [x]   MONITOR SP02/ABG'S AS NEEDED   []   PATIENT EDUCATION AS NEEDED   NONINVASIVE VENTILATION    PROVIDE OPTIMAL VENTILATION/ACCEPTABLE SPO2   IMPLEMENT NONINVASIVE VENTILATION PROTOCOL   MAINTAIN ACCEPTABLE SPO2   ASSESS SKIN INTEGRITY/BREAKDOWN SCORE   PATIENT EDUCATION AS NEEDED   BIPAP AS NEEDED

## 2022-11-11 NOTE — PROGRESS NOTES
[facility] -   Infectious diseases evaluation  admission date 11/8/2022    reason for consultation:   Atypical pneumonia    Impression :   Current:  Adenovirus respiratory infection with possible superimposed bacterial infection. Acute COPD exacerbation  Hyponatremia  Chronic hypercapnic respiratory failure  History of smoking    Recommendations   QuantiFERON-TB test was negative  IV cefepime and doxycycline  Nasal swab for MRSA was negative  Procalcitonin level was 0.1 and lactic acid was 0.8 yesterday  Mycoplasma IgM negative  Legionella urine antigen negative  Normal valdez growth on respiratory culture  HIV screen negative  Respiratory panel was positive for adenovirus PCR  Discussed with nursing staff  Continue supportive care    Infection Control Recommendations   [place] Precautions  Droplet Isolation      Antimicrobial Stewardship Recommendations   Simplification of therapy  Targeted therapy      History of Present Illness:   Initial history:  Kyleigh Henley is a 61y.o.-year-old female with history of smoking, COPD, chronic hypercapnic respiratory failure, noncompliant with home medication. She presented to hospital with worsening shortness of breath associated with cough productive of green phlegm for several days, symptoms moderate to severe. CT chest was negative for pulmonary embolism but showed multiple reticular nodule infiltrates in the upper lobes right more than left. Respiratory panel was positive for adenovirus PCR. The patient denied sick contact, no recent travel, denied hemoptysis, no chronic fatigue or night sweats. She is actively smoking, denied alcohol or drug abuse. Interval changes  11/11/2022   She is drowsy, arousable, follow commands, on BiPAP, on Precedex, blood pressure on the high side, afebrile, no new events.   Patient Vitals for the past 8 hrs:   BP Temp Temp src Pulse Resp SpO2   11/11/22 0900 (!) 189/66 -- -- (!) 105 25 92 %   11/11/22 0840 (!) 191/89 -- -- -- -- --   11/11/22 0830 (!) 191/89 -- -- (!) 105 26 92 %   11/11/22 0807 -- -- -- (!) 107 24 94 %   11/11/22 0800 (!) 202/86 97.8 °F (36.6 °C) Axillary 100 27 92 %   11/11/22 0700 (!) 147/68 -- -- 89 24 91 %   11/11/22 0600 (!) 181/80 -- -- (!) 103 26 91 %   11/11/22 0500 -- -- -- (!) 102 29 90 %   11/11/22 0400 (!) 184/85 98 °F (36.7 °C) Axillary 100 25 94 %   11/11/22 0348 -- -- -- (!) 108 (!) 32 91 %   11/11/22 0341 -- -- -- -- (!) 32 --   11/11/22 0300 (!) 159/76 -- -- 90 29 94 %               I have personally reviewed the past medical history, past surgical history, medications, social history, and family history, and I haveupdated the database accordingly. Allergies:   Patient has no known allergies. Review of Systems:     Review of Systems  Drowsy, on Precedex   Physical Examination :       Physical Exam  Constitutional:       Appearance: She is ill-appearing. HENT:      Right Ear: External ear normal.      Left Ear: External ear normal.      Mouth/Throat:      Pharynx: No oropharyngeal exudate. Eyes:      General: No scleral icterus. Conjunctiva/sclera: Conjunctivae normal.   Cardiovascular:      Rate and Rhythm: Normal rate and regular rhythm. Pulmonary:      Effort: Pulmonary effort is normal.      Breath sounds: Wheezing and rhonchi present. Abdominal:      General: There is no distension. Palpations: Abdomen is soft. Tenderness: There is no abdominal tenderness. Musculoskeletal:      Cervical back: Neck supple. No rigidity. Right lower leg: No edema. Left lower leg: No edema. Skin:     General: Skin is warm. Coloration: Skin is not jaundiced. Neurological:      Mental Status: She is alert.        Past Medical History:     Past Medical History:   Diagnosis Date    Acute respiratory failure ([address]     due to COPD exacerbation    COPD (chronic obstructive pulmonary disease) ([facility] [address]     with hypoxia    Cough     Current every day smoker     Dyspnea     Heart murmur     as a child    Hypertension     Shortness of breath        Past Surgical  History:     Past Surgical History:   Procedure Laterality Date     SECTION         Medications:      [Held by provider] dilTIAZem  30 mg Oral 4 times per day    hydrALAZINE  10 mg IntraVENous Q6H    ipratropium-albuterol  1 ampule Inhalation Q4H    [Held by provider] lisinopril  20 mg Oral Daily    doxycycline monohydrate  100 mg Oral 2 times per day    cefepime  2,000 mg IntraVENous Q8H    sodium chloride flush  5-40 mL IntraVENous 2 times per day    enoxaparin  40 mg SubCUTAneous Daily    [Held by provider] atorvastatin  40 mg Oral Daily    [Held by provider] montelukast  10 mg Oral Nightly    benzonatate  200 mg Oral TID    dextromethorphan  60 mg Oral 2 times per day    nicotine  1 patch TransDERmal Daily    methylPREDNISolone  40 mg IntraVENous Q6H       Social History:     Social History     Socioeconomic History    Marital status: Legally      Spouse name: Not on file    Number of children: Not on file    Years of education: Not on file    Highest education level: Not on file   Occupational History    Not on file   Tobacco Use    Smoking status: Every Day     Packs/day: 0.50     Types: Cigarettes    Smokeless tobacco: Never   Vaping Use    Vaping Use: Some days   Substance and Sexual Activity    Alcohol use: No    Drug use: No    Sexual activity: Not on file   Other Topics Concern    Not on file   Social History Narrative    Not on file     Social Determinants of Health     Financial Resource Strain: Not on file   Food Insecurity: Not on file   Transportation Needs: Not on file   Physical Activity: Not on file   Stress: Not on file   Social Connections: Not on file   Intimate Partner Violence: Not on file   Housing Stability: Not on file       Family History:   History reviewed. No pertinent family history.    Medical Decision Making:   I have independently reviewed/ordered the following labs:    CBC with Differential:   Recent Labs     11/08/22  1132 11/09/22  0415 11/10/22  0443 11/11/22  0507   WBC 18.4*   < > 21.8* 17.9*   HGB 15.9   < > 15.1 15.5   HCT 49.1*   < > 46.9* 48.2*   *   < > 409 432   LYMPHOPCT 13*  --   --   --    MONOPCT 7  --   --   --     < > = values in this interval not displayed. BMP:  Recent Labs     11/08/22  1132 11/09/22  0415 11/10/22  0443 11/10/22  0936   * 129* 130*  --    K 4.5 4.8 5.4* 5.2   CL 87* 88* 90*  --    CO2 30 31 34*  --    BUN 15 14 14  --    CREATININE 0.71 0.50 0.46*  --    MG 2.3  --   --   --        Hepatic Function Panel: No results for input(s): PROT, LABALBU, BILIDIR, IBILI, BILITOT, ALKPHOS, ALT, AST in the last 72 hours. No results for input(s): RPR in the last 72 hours. No results for input(s): HIV in the last 72 hours. No results for input(s): BC in the last 72 hours. Lab Results   Component Value Date/Time    CREATININE 0.46 11/10/2022 04:43 AM    GLUCOSE 131 11/10/2022 04:43 AM       Detailed results: Thank you for allowing us to participate in the care of this patient. Please call with questions. This note is created with the assistance of a speech recognition program.  While intending to generate adocument that actually reflects the content of the visit, the document can still have some errors including those of syntax and sound a like substitutions which may escape proof reading. It such instances, actual meaningcan be extrapolated by contextual diversion.     Joi Dior MD  Office: [phone]  Perfect serve / office [phone]

## 2022-11-11 NOTE — PLAN OF CARE
Problem: Discharge Planning  Goal: Discharge to home or other facility with appropriate resources  Outcome: Progressing  Flowsheets  Taken 11/11/2022 1200  Discharge to home or other facility with appropriate resources: Identify barriers to discharge with patient and caregiver  Taken 11/11/2022 0800  Discharge to home or other facility with appropriate resources: Identify barriers to discharge with patient and caregiver     Problem: Safety - Adult  Goal: Free from fall injury  Outcome: Progressing  Note: Patient remains free from falls. Problem: Pain  Goal: Verbalizes/displays adequate comfort level or baseline comfort level  Outcome: Progressing  Flowsheets  Taken 11/11/2022 1200  Verbalizes/displays adequate comfort level or baseline comfort level: Assess pain using appropriate pain scale  Taken 11/11/2022 0800  Verbalizes/displays adequate comfort level or baseline comfort level: Assess pain using appropriate pain scale  Note: Pain assessed at regular intervals. Patient denies having any pain. Problem: Respiratory - Adult  Goal: Achieves optimal ventilation and oxygenation  Outcome: Progressing  Flowsheets  Taken 11/11/2022 1200  Achieves optimal ventilation and oxygenation: Assess for changes in respiratory status  Taken 11/11/2022 0800  Achieves optimal ventilation and oxygenation:   Assess for changes in mentation and behavior   Assess for changes in respiratory status  Note: Patient placed on 6L salter for short period of time but placed back on bipap due to increased WOB.       Problem: Cardiovascular - Adult  Goal: Maintains optimal cardiac output and hemodynamic stability  Outcome: Progressing  Flowsheets (Taken 11/11/2022 0800)  Maintains optimal cardiac output and hemodynamic stability: Monitor blood pressure and heart rate  Goal: Absence of cardiac dysrhythmias or at baseline  Outcome: Progressing     Problem: Skin/Tissue Integrity - Adult  Goal: Skin integrity remains intact  Outcome: Progressing  Flowsheets  Taken 11/11/2022 1200  Skin Integrity Remains Intact: Monitor for areas of redness and/or skin breakdown  Taken 11/11/2022 0800  Skin Integrity Remains Intact: Monitor for areas of redness and/or skin breakdown  Note: Patient able to turn self in bed. No breakdown noted. Goal: Oral mucous membranes remain intact  Outcome: Progressing  Flowsheets (Taken 11/11/2022 0800)  Oral Mucous Membranes Remain Intact: Assess oral mucosa and hygiene practices     Problem: Death & Dying  Goal: Pt/Family communicate acceptance of impending death and feel psychological comfort and peace  Description: INTERVENTIONS:  1. Assess patient/family anxiety and grief process related to end of life issues  2. Provide emotional and spiritual support  3. Provide information about the patient's health status with consideration of family and cultural values  4. Communicate willingness to discuss death and facilitate grief process  with patient/family as appropriate  5. Emphasize sustaining relationships within family system and community, or amy/spiritual traditions  6. Initiate Spiritual Care, Psychosocial Clinical Specialist, consult as needed  Outcome: Progressing  Flowsheets (Taken 11/11/2022 0800)  Patient/family communicates acceptance of loss or impending death and feels physical/psychological comfort and peace: Assess patient/family anxiety and grief process related to end of life issues     Problem: Decision Making  Goal: Pt/Family able to effectively weigh alternatives and participate in decision making related to treatment and care  Description: INTERVENTIONS:  1. Determine when there are differences between patient's view, family's view, and healthcare provider's view of condition  2. Facilitate patient and family articulation of goals for care  3. Help patient and family identify pros/cons of alternative solutions  4. Provide information as requested by patient/family  5.  Respect patient/family right to receive or not to receive information  6. Serve as a liaison between patient and family and health care team  7. Initiate Consults from Ethics, Palliative Care or initiate [facility] as is appropriate  Outcome: Progressing  Flowsheets (Taken 11/11/2022 0800)  Patient/family able to effectively weigh alternatives and participate in decision making related to treatment and care: Determine when there are differences between patient's view, family's view, and healthcare provider's view of condition     Problem: Infection - Adult  Goal: Absence of infection at discharge  Outcome: Progressing  Flowsheets  Taken 11/11/2022 1200  Absence of infection at discharge: Assess and monitor for signs and symptoms of infection  Taken 11/11/2022 0800  Absence of infection at discharge: Assess and monitor for signs and symptoms of infection  Goal: Absence of fever/infection during anticipated neutropenic period  Outcome: Progressing  Flowsheets  Taken 11/11/2022 1200  Absence of fever/infection during anticipated neutropenic period: Monitor white blood cell count  Taken 11/11/2022 0800  Absence of fever/infection during anticipated neutropenic period: Monitor white blood cell count     Problem: Skin/Tissue Integrity  Goal: Absence of new skin breakdown  Description: 1. Monitor for areas of redness and/or skin breakdown  2. Assess vascular access sites hourly  3. Every 4-6 hours minimum:  Change oxygen saturation probe site  4. Every 4-6 hours:  If on nasal continuous positive airway pressure, respiratory therapy assess nares and determine need for appliance change or resting period. Outcome: Progressing  Note: Patient turned and repositioned every two hours. No new breakdown noted.       Problem: ABCDS Injury Assessment  Goal: Absence of physical injury  Outcome: Progressing

## 2022-11-11 NOTE — PROGRESS NOTES
Comprehensive Nutrition Assessment    Type and Reason for Visit:  Consult (PN ordering and management)    Nutrition Recommendations/Plan:   Start TPN at 41.6 ml with 100 ml lipids. Additives:NaCl- 35 mEq, Na Phos- 20 mmol, add MVI & trace elements. Malnutrition Assessment:  Malnutrition Status: At risk for malnutrition (Comment) (due to breathing difficulty & spitting food out) (11/11/22 2791)    Context:  Acute Illness     Findings of the 6 clinical characteristics of malnutrition:  Energy Intake:  50% or less of estimated energy requirements for 5 or more days  Weight Loss:  Unable to assess     Body Fat Loss:  Unable to assess     Muscle Mass Loss:  Unable to assess    Fluid Accumulation:  No significant fluid accumulation     Strength:  Not Performed    Nutrition Assessment:    Nurse reports pt is mostly bipap dependent but if you do get something in her mouth she spits it out so TPN to start. Infromed RN that phosphorus very low at 1.4 and could only add 20 mmol into TPN. TPN will start at 41.6 ml with 100 ml lipids with following additives: NaCl- 35 mEq, Na phos- 20 mmol, add MVI & trace elements. Nutrition Related Findings:    Edema: +1 non-pitting RUE. Hx: COPD, tobacco abuse, HTN. Labs & meds reviewed. Wound Type: None       Current Nutrition Intake & Therapies:    Average Meal Intake: Refusing to eat     ADULT DIET; Regular  PN-ADULT PREMIX 5/20 - [facility]EN[facility]  Current Parenteral Nutrition Orders:  Type and Formula: 2-in-1 Custom   Lipids: 100ml  Duration: Continuous  Rate/Volume: 41.6 ml/ 998 ml  Current PN Order Provides: 1080 kcals 50 gm protein      Anthropometric Measures:  Height: 4' 11\" (149.9 cm)  Ideal Body Weight (IBW): 95 lbs (43 kg)    Admission Body Weight: 128 lb (58.1 kg)  Current Body Weight: 128 lb (58.1 kg), 134.7 % IBW.  Weight Source: Bed Scale  Current BMI (kg/m2): 25.8  Usual Body Weight: 162 lb (73.5 kg) (11-1-2019)  % Weight Change (Calculated): -21                    BMI Categories: Overweight (BMI 25.0-29. 9)    Estimated Daily Nutrient Needs:  Energy Requirements Based On: Kcal/kg  Weight Used for Energy Requirements: Current  Energy (kcal/day): 1494-8971 kcals based on 25-27 kcals/kg  Weight Used for Protein Requirements: Current  Protein (g/day): 76-82 gm protein based on 1.3-1.4 gm/kg         Nutrition Diagnosis:   Inadequate oral intake related to impaired respiratory function as evidenced by intake 0-25%    Nutrition Interventions:   Food and/or Nutrient Delivery: Continue Current Diet, Start Parenteral Nutrition  Nutrition Education/Counseling: No recommendation at this time  Coordination of Nutrition Care: Continue to monitor while inpatient       Goals:     Goals: Tolerate nutrition support at goal rate       Nutrition Monitoring and Evaluation:   Behavioral-Environmental Outcomes: None Identified  Food/Nutrient Intake Outcomes: Food and Nutrient Intake, Parenteral Nutrition Intake/Tolerance  Physical Signs/Symptoms Outcomes: Biochemical Data, Chewing or Swallowing, GI Status, Nutrition Focused Physical Findings, Skin, Weight    Discharge Planning:     Too soon to determine     Nichole Zhang, [address] [place] [address],   [phone]

## 2022-11-11 NOTE — PROGRESS NOTES
Dr. Princess Quintanilla at bedside. MD stated that patient is able to come off bipap as tolerated. RN notified MD that patient is extremely lethargic and not able to take any pills. MD stated to shut off precedex gtt and try again shortly. MD also placing orders for hypertension.

## 2022-11-11 NOTE — PROGRESS NOTES
RN entered room to patient tachycardic in the 120's, RR mid 30's and attempting to get out of bed. Bipap placed back on patient at this time and precedex gtt restarted.

## 2022-11-11 NOTE — PROGRESS NOTES
[address]    PROGRESS NOTE             11/11/2022    7:35 AM    Name:   Mohan Malloy  MRN:     [id number]     Acct:      [de-identified]   Room:   2005/2005-01  IP Day:  3  Admit Date:  11/8/2022 11:16 AM    PCP:  Chula Santoyo DTR  Code Status:  Full Code    Subjective:     C/C:   Chief Complaint   Patient presents with    Dizziness    Cough    Shortness of Breath     Interval History Status: improved. The patient was seen and examined at the bedside. The patient was complaining of pain in upper left arm. BP left arm 96/64. BP right arm 168/94. Radial pulse on left is moderate. Discussed CT scan with radiology (most likely subclavian artery stenosis vs. Occlusion)     Duplex Upper extremity for left arm pending     Patient currently on Precedex drip. Patient currently on Bipap, saturating 91% on 8 L. Continue to monitor; possible intubation      She denies headache, dizziness, syncope, fever, chills, abdominal pain, nausea, vomiting, constipation, and diarrhea. Brief History:      The patient is a 61year old non- female with a history of COPD (not on home oxygen), smoking (60 pack-years), hypertension, and hyperlipidemia who presents with shortness of breath worsening over the past week. The patient endorses a cough productive of green phlegm that has also worsened over the past week. She denies headache, dizziness, syncope, fever, chills, abdominal pain, nausea, vomiting, constipation and diarrhea. In the ED, the patient was hypoxic  (Sp02 58% on room air), tachycardic (108), and tachypneic (34). ABGs showed hypercapnia (pC02 58.3) and hypoxia (pO2 63.4). The patient was placed on BiPAP, but did not tolerate it and was placed on high flow nasal cannula. The patient was on 30 L high flow with 50% Fi02 and saturating 92%. The patient was given one dose of Solumedrol 125 mg and one dose of Duoneb nebulizer.  The patient was given a bolus of IV normal saline. Respiratory panel was positive for adenovirus. IV ceftriaxone was started. Glucose was 158. Creatinine was 0.71. WBCs were elevated at 18.4. Troponin was elevated at 56; repeat 50. EKG showed sinus tachycardia with occasional PVCs, possible LA enlargement, right superior axis deviation, and incomplete right bundle branch block, RV hypertrophy, T wave abnormality (possible inferior and anterolateral ischemia). CT chest was negative for pulmonary embolism, but showed multifocal tree-in-bud and nodular opacities throughout both lungs likely representing recurrent aspiration or atypical mycobacterium infection; mild emphysema, bronchiectasis and scarring along medial right upper lobe. The patient was admitted for management of acute on chronic hypoxic, hypercapnic respiratory failure secondary to COPD exacerbation and multifocal pneumonia. Pulmonology and infectious disease were consulted     Review of Systems:     Review of Systems    Unable to complete due to patient somnolent status   Medications:      Allergies:  No Known Allergies    Current Meds:   Scheduled Meds:    ipratropium-albuterol  1 ampule Inhalation Q4H    lisinopril  20 mg Oral Daily    doxycycline monohydrate  100 mg Oral 2 times per day    cefepime  2,000 mg IntraVENous Q8H    sodium chloride flush  5-40 mL IntraVENous 2 times per day    enoxaparin  40 mg SubCUTAneous Daily    atorvastatin  40 mg Oral Daily    montelukast  10 mg Oral Nightly    benzonatate  200 mg Oral TID    dextromethorphan  60 mg Oral 2 times per day    nicotine  1 patch TransDERmal Daily    methylPREDNISolone  40 mg IntraVENous Q6H     Continuous Infusions:    dexmedetomidine 0.3 mcg/kg/hr (11/11/22 6126)    sodium chloride       PRN Meds: hydrALAZINE, perflutren lipid microspheres, midodrine, sodium chloride flush, sodium chloride flush, sodium chloride, ondansetron **OR** ondansetron, polyethylene glycol, acetaminophen **OR** acetaminophen    Data:     Past Medical History:   has a past medical history of Acute respiratory failure ([facility] [address] COPD (chronic obstructive pulmonary disease) ([facility] [address] Cough, Current every day smoker, Dyspnea, Heart murmur, Hypertension, and Shortness of breath. Social History:   reports that she has been smoking cigarettes. She has been smoking an average of .5 packs per day. She has never used smokeless tobacco. She reports that she does not drink alcohol and does not use drugs. Family History: History reviewed. No pertinent family history. Vitals:  /66   Pulse (!) 108   Temp 97.8 °F (36.6 °C) (Axillary)   Resp (!) 32   Ht 4' 11\" (1.499 m)   Wt 128 lb 15.5 oz (58.5 kg)   SpO2 91%   BMI 26.05 kg/m²   Temp (24hrs), Av.8 °F (36.6 °C), Min:97.7 °F (36.5 °C), Max:98.2 °F (36.8 °C)    No results for input(s): POCGLU in the last 72 hours. I/O(24Hr): Intake/Output Summary (Last 24 hours) at 2022 0735  Last data filed at 11/10/2022 1821  Gross per 24 hour   Intake 2321.63 ml   Output 1600 ml   Net 721.63 ml       Labs:  [unfilled]    Lab Results   Component Value Date/Time    SPECIAL NOT REPORTED 10/28/2019 07:58 PM     Lab Results   Component Value Date/Time    CULTURE NORMAL RESPIRATORY DAVID MODERATE GROWTH 2022 10:25 PM       [unfilled]    Radiology:    XR CHEST PORTABLE    Result Date: 11/10/2022  EXAMINATION: ONE XRAY VIEW OF THE CHEST 11/10/2022 7:46 am COMPARISON: 2022, 10/29/2019 HISTORY: ORDERING SYSTEM PROVIDED HISTORY: SOB TECHNOLOGIST PROVIDED HISTORY: SOB Reason for Exam: dyspnea FINDINGS: Interstitial opacities with mild septal thickening has increased in the interval.  Perihilar opacities are noted as well. No pneumothorax or significant effusion appreciated. Cardiac and mediastinal contours appear unchanged. Increasing interstitial opacities, which may represent developing edema versus inflammatory process or atypical infection.      XR CHEST PORTABLE    Result Date: 11/8/2022  EXAMINATION: ONE XRAY VIEW OF THE CHEST 11/8/2022 8:37 am COMPARISON: 10/19/2019 HISTORY: ORDERING SYSTEM PROVIDED HISTORY: sob TECHNOLOGIST PROVIDED HISTORY: sob Reason for Exam: sob FINDINGS: Cardial pericardial silhouette is prominent but stable. Increased interstitial opacities noted bilaterally. Focal infiltrate is not identified. No pneumothorax. No free air. No acute bony abnormality. Increased interstitial opacity. While much or all of this may be chronic, please correlate with any clinical evidence of acute interstitial edema. CT CHEST PULMONARY EMBOLISM W CONTRAST    Result Date: 11/8/2022  EXAMINATION: CTA OF THE CHEST 11/8/2022 12:44 pm TECHNIQUE: CTA of the chest was performed after the administration of intravenous contrast.  Multiplanar reformatted images are provided for review. MIP images are provided for review. Automated exposure control, iterative reconstruction, and/or weight based adjustment of the mA/kV was utilized to reduce the radiation dose to as low as reasonably achievable. COMPARISON: 10/31/2019 HISTORY: ORDERING SYSTEM PROVIDED HISTORY: sob tachycardia TECHNOLOGIST PROVIDED HISTORY: sob tachycardia Decision Support Exception - unselect if not a suspected or confirmed emergency medical condition->Emergency Medical Condition (MA) Reason for Exam: sob, COPD 65-year-old female with shortness of breath, COPD, tachycardia FINDINGS: Pulmonary Arteries: No obvious filling defect in the pulmonary arterial vasculature that meets the criteria for pulmonary embolus within the limitations of this study. Evaluation of the pulmonary arterial vasculature is limited due to streak artifact from the contrast bolus in the SVC, suboptimal bolus timing, and respiratory motion. Mediastinum: Visualized thyroid gland grossly unremarkable in appearance. Atheromatous plaque and atherosclerotic calcification of the aorta. Coronary artery disease.   Bilateral hilar lymph node enlargement. No axillary lymphadenopathy. No mediastinal lymphadenopathy. No dissection flap within the visualized thoracic aorta. No pericardial or pleural effusions. No periaortic or mediastinal hemorrhage. Lungs/pleura: Trachea and proximal central airways appear patent. Respiratory motion throughout both lungs. Mild emphysema. Bronchiectasis and scarring along the medial right upper lobe. Tree-in-bud and nodular opacities throughout the bilateral upper lobes. Similar findings are seen within the right middle lobe, lingula, and superior segments of the lower lobes as well as the anteroinferior lower lobes. Upper Abdomen: Fatty liver. Atheromatous plaque and atherosclerotic calcification of the upper abdominal aorta and branch vasculature. Evaluation of the upper abdomen is limited due to respiratory motion. Soft Tissues/Bones: Mild diffuse degenerative changes throughout the spine. 1. No clear evidence for central pulmonary embolus within the limitations of this study. 2. Multifocal tree-in-bud and nodular opacities throughout both lungs as detailed above likely representing sequela of recurrent aspiration or atypical mycobacterial infection. Follow-up is recommended to document resolution. 3. Mild emphysema. Respiratory motion. Bronchiectasis and scarring along the medial right upper lobe. 4. Atheromatous plaque and atherosclerotic calcification of the aorta. Coronary artery disease. 5. Bilateral hilar lymph node enlargement, likely reactive. 6. Fatty liver. Physical Examination:        Physical Exam  Constitutional:       Appearance: Normal appearance. She is ill-appearing. HENT:      Head: Normocephalic and atraumatic. Nose: No congestion or rhinorrhea. Eyes:      Extraocular Movements: Extraocular movements intact. Conjunctiva/sclera: Conjunctivae normal.      Pupils: Pupils are equal, round, and reactive to light.    Cardiovascular:      Rate and Rhythm: Normal rate and regular rhythm. Pulses: Normal pulses. Heart sounds: Normal heart sounds. No murmur heard. No friction rub. No gallop. Pulmonary:      Effort: Respiratory distress present. Breath sounds: Normal breath sounds. No wheezing, rhonchi or rales. Abdominal:      General: Abdomen is flat. Bowel sounds are normal. There is no distension. Tenderness: There is no abdominal tenderness. There is no guarding. Musculoskeletal:      Right lower leg: No edema. Left lower leg: No edema. Skin:     Capillary Refill: Capillary refill takes less than 2 seconds. Coloration: Skin is not jaundiced or pale. Neurological:      General: No focal deficit present. Mental Status: She is alert. Sensory: No sensory deficit. Motor: No weakness. Psychiatric:         Mood and Affect: Mood normal.         Assessment:        Primary Problem  Acute respiratory failure [facility])    Active Hospital Problems    Diagnosis Date Noted    Acute [facility] failure ([facility] [J96.00] 11/08/2022     Priority: Medium       Plan:         Acute on chronic hypoxic, hypercapnic respiratory failure 2/2 COPD exacerbation and multifocal pneumonia r/o TB  -ABGs: pH 7.325, pC02 81.2, p02 70.6; HC03 42.3  -Patient placed on BiPAP yesterday; currently saturating 91% on 8L; Fi02 40  -CXR: increased interstitial opacity (edema vs. Atypical resp. Infection)  -CT PE: multifocal tree-in-bud and nodular opacities throughout both lungs likely representing recurrent aspiration or atypical mycobacterium infection; mild emphysema, bronchiectasis and scarring along medial right upper lobe; Lt. subclavian artery stenosis vs. occlusion  -WBCs on admission 18.4; today:17.9  -Respiratory viral panel: positive for adenovirus  -Respiratory culture normal valdez  -Legionella negative, S.  Pneumoniae negative, Mycoplasma negative  -QuantiFERON gold test: negative   -Pro-BNP: 1,874  -Echo pending   -Pro-calcitonin 0.10  -Lactic acid 0.8  -Lasix 20 mg IV once given yesterday  -Continue Cefepime 2000 mg IV q 8 for 7 days   -Continue Doxycycline 100 mg po q 12 hours for 7 days   -Continue DuoNeb aerols  -Continue anti-tussives: Benzozonate, Delsym   -Continue Singulair 10 mg po nightly   -Pulmonary consulted  -ID consulted     Hyponatremia (likely SIADH)  -Na+ on admission 130; Na+ today 130   -Hold IV normal saline  -Continue to monitor      Hyperkalemia (resolved)  -K+ today 5.2   -Continue to monitor      Hyperlipidemia  -Continue Lipitor 40 mg po daily      Hypertension  -BP today 150/78  -Continue Hydralazine 5 mg IV q 6 hours PRN; hydralazine given this am  -Continue Lisinopril 20 mg po BID   -Midodrine 5 mg po BID PRN if MAP < 65     Contact and droplet isolation   DVT prophylaxis: Lovenox 40 mg SC daily   Code: Full code  Diet: Regular adult diet   PT/OT/SW consulted    Vanessa Castañeda MD  11/11/2022  7:35 AM     I have discussed the care of Guerline Garrison , including pertinent history and exam findings,    today with the resident. I have seen and examined the patient and the key elements of all parts of the encounter have been performed by me . I agree with the assessment, plan and orders as documented by the resident. Principal Problem:    Acute respiratory failure ([address]  Resolved Problems:    * No resolved hospital problems. *        Overall  course ;                                   are worsening over time.         Patient, clinically getting worse  Has encephalopathy, likely hypoxic, hypercapnic  ABG suggestive of increasing CO2 levels  Blood pressure running high as per nursing report not taking pills  High chances of clinical deterioration and intubation  Will start patient on TPN  Will observe blood pressure for couple of hours with hydralazine, if do not improve, will start on Cardene drip  Patient CT scans was discussed with radiologist department, concern for left subclavian artery stenosis/occlusion  Vascular surgery consulted  Patient critically sick  Seen in ICU  CC time 32 minutes            Electronically signed by Elisa Dang MD

## 2022-11-11 NOTE — CARE COORDINATION
ONGOING DISCHARGE PLAN:    Per chart notes, Patient denies need for VNS - However she needs closely following due to respiratory status. Patient in continuous Bipap    Remains on IV cefepime, IV solu medrol, Precedex gtt, TPN    Labs; WBC 17.9, BUN 21, Cre 0.49, Hgb 15.5    QuantiFERON-TB test was negative    + Adenovirus    Patient will need nebulizer prior to discharge    Will need Home oxygen eval prior to discharge     Will continue to follow for additional discharge needs.     Electronically signed by Holley Shone, RN on 11/11/2022 at 3:21 PM

## 2022-11-11 NOTE — PROGRESS NOTES
RN rounded with resident team and Dr. Kylee Woods. Notified Dr. Kylee Woods that patient was not able to take any of the PO medications this morning. Additionally, MD notified that patient was not able to eat any food and did not eat anything yesterday. Orders placed for TPN and PICC line placement. MD stated to call resident team if patient remains hypertensive after hydralazine given as she may need to be started on cardene gtt. RN will continue to monitor.

## 2022-11-11 NOTE — PROGRESS NOTES
Dr. Hollie Hunt notified of continued hypertension despite scheduled hydralazine and PRN lopressor that was given. Orders received for Cardene gtt and titrate to SBP less than 150.

## 2022-11-11 NOTE — CONSULTS
VASCULAR SURGERY H&P      Subjective: Tushar Wade is a 61year old female who presented to the ED with worsening shortness of breath and productive cough over the last several days with green sputum. She was placed on high flow which according to documentation, she could not tolerate. She was then placed on BiPAP which she has remained on since. She was diagnosed with adenovirus respiratory infection and has been followed by ID. Her blood pressures were taking in bilateral upper extremities yesterday with noted 30+ mm Hg systolic and 30 mm Hg diastolic difference which is suggestive of left subclavian stenosis. She has history of COPD, is a chronic smoker and history of medication noncompliance.      Review of Systems   Unable to perform ROS: Acuity of condition     Objective:      Past Medical History   Past Medical History:   Diagnosis Date    Acute respiratory failure ([address]     due to COPD exacerbation    COPD (chronic obstructive pulmonary disease) (HCC)     with hypoxia    Cough     Current every day smoker     Dyspnea     Heart murmur     as a child    Hypertension     Shortness of breath         Past Surgical History   Past Surgical History:   Procedure Laterality Date     SECTION          Social History  Social History     Socioeconomic History    Marital status: Legally      Spouse name: Not on file    Number of children: Not on file    Years of education: Not on file    Highest education level: Not on file   Occupational History    Not on file   Tobacco Use    Smoking status: Every Day     Packs/day: 0.50     Types: Cigarettes    Smokeless tobacco: Never   Vaping Use    Vaping Use: Some days   Substance and Sexual Activity    Alcohol use: No    Drug use: No    Sexual activity: Not on file   Other Topics Concern    Not on file   Social History Narrative    Not on file     Social Determinants of Health     Financial Resource Strain: Not on file   Food Insecurity: Not on file Transportation Needs: Not on file   Physical Activity: Not on file   Stress: Not on file   Social Connections: Not on file   Intimate Partner Violence: Not on file   Housing Stability: Not on file        Current Medications   [Held by provider] dilTIAZem  30 mg Oral 4 times per day    hydrALAZINE  10 mg IntraVENous Q6H    fat emulsion  100 mL IntraVENous Daily    insulin lispro  0-4 Units SubCUTAneous Q6H    ipratropium-albuterol  1 ampule Inhalation Q4H    [Held by provider] lisinopril  20 mg Oral Daily    doxycycline monohydrate  100 mg Oral 2 times per day    cefepime  2,000 mg IntraVENous Q8H    sodium chloride flush  5-40 mL IntraVENous 2 times per day    enoxaparin  40 mg SubCUTAneous Daily    [Held by provider] atorvastatin  40 mg Oral Daily    [Held by provider] montelukast  10 mg Oral Nightly    benzonatate  200 mg Oral TID    dextromethorphan  60 mg Oral 2 times per day    nicotine  1 patch TransDERmal Daily    methylPREDNISolone  40 mg IntraVENous Q6H        Allergies  No Known Allergies     Labs  Recent Labs     11/11/22  0507 11/10/22  0443 11/09/22  0415   WBC 17.9* 21.8* 21.0*   HGB 15.5 15.1 15.2   HCT 48.2* 46.9* 46.5*   MCV 92.5 92.9 93.6    409 468*     Lab Results   Component Value Date/Time     11/11/2022 10:35 AM    K 4.8 11/11/2022 10:35 AM    CL 92 11/11/2022 10:35 AM    CO2 36 11/11/2022 10:35 AM    BUN 21 11/11/2022 10:35 AM    CREATININE 0.49 11/11/2022 10:35 AM    GLUCOSE 141 11/11/2022 10:35 AM    CALCIUM 9.5 11/11/2022 10:35 AM      No results found for: CKTOTAL, CKMB, CKMBINDEX, TROPONINI  Lab Results   Component Value Date    ALT 12 11/01/2019    AST 11 11/01/2019    ALKPHOS 44 11/01/2019    BILITOT 0.27 (L) 11/01/2019        I/O  I/O last 3 completed shifts: In: 2472.1 [P.O.:100;  I.V.:77.6; IV Piggyback:2294.4]  Out: 1900 [Urine:1900]  I/O this shift:  In: 307.9 [I.V.:30; IV Piggyback:276.2]  Out: 150 [Urine:150]      Vitals  Vitals:    11/11/22 1700   BP: (!) 180/72 Pulse: 100   Resp: 27   Temp:    SpO2: 94%          Physical Examination    General: Appears drowsy, shakes head yes and no to some questions asked, ill-appearing  HENT: Normocephalic, atraumatic  Neck: Supple  CV: RRR   Radial: 2+B   Brachial: 1+B   Popliteal: 1+B   Dorsalis Pedis: 2+B   Posterior Tibial: 2+B  Respiratory: BiPAP in place, lung sounds coarse throughout  Abdominal: Decreased bowel sounds, abdomen soft  Extremities: Able to move all extremities. Skin: warm, dry and pale, bilateral hands and feet warm with brisk capillary refill, no wounds noted to exposed skin  Psych: On BiPAP, does not answer questions      Assessment:  Left subclavian stenosis    Plan:    Ms. Jaswant Montiel is acutely ill and is currently unable to verbally answer questions. She shakes her no to left upper extremity pain, numbness, tingling and previous wounds. No wounds noted on examination and left radial pulse is 2+. Her hands are equally warm to the touch. I spoke with my attending physician, Dr. Bunny Wing regarding her treatment plan. Given her current health status, we will follow her on an outpatient basis in order to allow for healing. No urgent vascular intervention is required. We will sign off care at this time. Thank you for allowing us to participate in Ms. Tasha cox. Your referrals are greatly appreciated. Please do not hesitate to contact our service with any questions or concerns regarding her management.     Queenie Jenkins, APRN-CNP  [facility] [place]

## 2022-11-11 NOTE — PROGRESS NOTES
ICU Progress Note (Non-Vent)  [facility]    Patient - Mariano Royal,  Age - 61 y.o.    - 1965      Room Number -    N -  [id number]   Acct # - [de-identified]  Date of Admission -  2022 11:16 AM    Events of Past 24 Hours   She rested on Precedex drip, this morning was very sleepy but the drip has been decreased and now she is responding and following commands  Remains on BiPAP    Vitals    height is 4' 11\" (1.499 m) and weight is 128 lb 15.5 oz (58.5 kg). Her axillary temperature is 98 °F (36.7 °C). Her blood pressure is 147/68 (abnormal) and her pulse is 89. Her respiration is 24 and oxygen saturation is 91%.        Temperature Range: Temp: 98 °F (36.7 °C) Temp  Av.9 °F (36.6 °C)  Min: 97.7 °F (36.5 °C)  Max: 98.2 °F (36.8 °C)  BP Range:  Systolic (47QRM), DUZ:983 , Min:116 , QDZ:321     Diastolic (92QOK), GUL:93, Min:66, Max:129    Pulse Range: Pulse  Av.4  Min: 55  Max: 112  Respiration Range: Resp  Av.3  Min: 17  Max: 32  Current Pulse Ox[de-identified]  SpO2: 91 %  24HR Pulse Ox Range:  SpO2  Av.4 %  Min: 90 %  Max: 98 %  Oxygen Amount and Delivery: O2 Flow Rate (L/min): 8 L/min    Wt Readings from Last 3 Encounters:   22 128 lb 15.5 oz (58.5 kg)   19 162 lb 14.7 oz (73.9 kg)   18 150 lb (68 kg)     I/O     Intake/Output Summary (Last 24 hours) at 2022 0815  Last data filed at 2022 0600  Gross per 24 hour   Intake 2472.06 ml   Output 1600 ml   Net 872.06 ml     DRAIN/TUBE OUTPUT       Invasive Lines   ICP PRESSURE RANGE  No data recorded  CVP PRESSURE RANGE  No data recorded      Medications      ipratropium-albuterol  1 ampule Inhalation Q4H    lisinopril  20 mg Oral Daily    doxycycline monohydrate  100 mg Oral 2 times per day    cefepime  2,000 mg IntraVENous Q8H    sodium chloride flush  5-40 mL IntraVENous 2 times per day    enoxaparin  40 mg SubCUTAneous Daily    atorvastatin  40 mg Oral Daily    montelukast  10 mg Oral Nightly    benzonatate  200 mg Oral TID    dextromethorphan  60 mg Oral 2 times per day    nicotine  1 patch TransDERmal Daily    methylPREDNISolone  40 mg IntraVENous Q6H     hydrALAZINE, perflutren lipid microspheres, midodrine, sodium chloride flush, sodium chloride flush, sodium chloride, ondansetron **OR** ondansetron, polyethylene glycol, acetaminophen **OR** acetaminophen  IV Drips/Infusions   dexmedetomidine 0.2 mcg/kg/hr (11/11/22 0736)    sodium chloride         Diet/Nutrition   ADULT DIET; Regular    Exam      Constitutional - Alert, arousable  General Appearance ill-appearing, frail  HEENT -normocephalic, atraumatic. PERRLA  Lungs - Chest expands equally, scattered wheezes  Cardiovascular - Heart sounds are normal.  normal rate and rhythm regular, no murmur, gallop or rub. Abdomen - soft, nontender, nondistended, no masses or organomegaly  Neurologic - CN II-XII are grossly intact.  There are no focal motor deficits  Skin - no bruising or bleeding  Extremities - no cyanosis, clubbing or edema    Lab Results   CBC     Lab Results   Component Value Date/Time    WBC 17.9 11/11/2022 05:07 AM    RBC 5.22 11/11/2022 05:07 AM    HGB 15.5 11/11/2022 05:07 AM    HCT 48.2 11/11/2022 05:07 AM     11/11/2022 05:07 AM    MCV 92.5 11/11/2022 05:07 AM    MCH 29.7 11/11/2022 05:07 AM    MCHC 32.2 11/11/2022 05:07 AM    RDW 14.0 11/11/2022 05:07 AM    METASPCT 2 11/08/2022 11:32 AM    LYMPHOPCT 13 11/08/2022 11:32 AM    MONOPCT 7 11/08/2022 11:32 AM    MYELOPCT 1 12/26/2014 06:39 AM    BASOPCT 0 11/08/2022 11:32 AM    MONOSABS 1.29 11/08/2022 11:32 AM    LYMPHSABS 2.39 11/08/2022 11:32 AM    EOSABS 0.00 11/08/2022 11:32 AM    BASOSABS 0.00 11/08/2022 11:32 AM    DIFFTYPE NOT REPORTED 11/01/2019 05:24 AM       BMP   Lab Results   Component Value Date/Time     11/10/2022 04:43 AM    K 5.2 11/10/2022 09:36 AM    CL 90 11/10/2022 04:43 AM    CO2 34 11/10/2022 04:43 AM    BUN 14 11/10/2022 04:43 AM    CREATININE 0.46 11/10/2022 04:43 AM    GLUCOSE 131 11/10/2022 04:43 AM       LFTS  Lab Results   Component Value Date/Time    ALKPHOS 44 11/01/2019 05:24 AM    ALT 12 11/01/2019 05:24 AM    AST 11 11/01/2019 05:24 AM    PROT 6.1 11/01/2019 05:24 AM    BILITOT 0.27 11/01/2019 05:24 AM    LABALBU 3.0 11/01/2019 05:24 AM       ABG ABGs:   Lab Results   Component Value Date/Time    PHART 7.325 11/10/2022 05:52 PM    PO2ART 70.6 11/10/2022 05:52 PM    MWA8QXD 81.2 11/10/2022 05:52 PM       Lab Results   Component Value Date/Time    MODE BIPAP 11/10/2022 05:52 PM         INR  Recent Labs     11/08/22  1132   PROTIME 13.1   INR 1.0       APTT  Recent Labs     11/08/22  1132   APTT 30.0       Lactic Acid  Lab Results   Component Value Date/Time    LACTA 0.8 11/10/2022 03:46 PM        BNP   No results for input(s): BNP in the last 72 hours. Cultures       Radiology     CXR      CT Scans    (See actual reports for details)      SYSTEMS ASSESSMENT    Acute hypoxic respiratory failure  Multifocal pneumonia-most likely atypical/adenovirus  Acute exacerbation COPD  Poorly controlled hypertension  Hyponatremia-most likely SIADH from underlying pulmonary issues  Chronic hypercapnic respiratory failure  History of tobacco abuse  Medical noncompliance    Continue BiPAP but will give time off so she can take her medications  Use limited amount of Precedex  Continue bronchodilators every 4 hours  Start Cardizem 30 mg every 6 hours for better blood pressure control  Is on lisinopril could increase that dose as well  DVT prophylaxis  Recheck chest x-ray tomorrow  Respiratory status quite tenuous, still may require intubation  I called her daughter Karyna Sultana and updated her on the situation. She is aware that her mother may require intubation.     Critical Care Time   0 min    Electronically signed by Danielito Lemos MD on 11/11/2022 at 8:15 AM

## 2022-11-11 NOTE — PROGRESS NOTES
[address]   OCCUPATIONAL THERAPY MISSED TREATMENT NOTE   INPATIENT   Date: 22  Patient Name: Dino Donaldson       Room:   MRN: [id number]   Account #: [de-identified]    : 1965  (61 y.o.)  Gender: female   Referring Practitioner: Do Graves MD  Diagnosis: Acute respiratory failure             REASON FOR MISSED TREATMENT:  Patient refusal   -    [id number]. Pt on bipap and after writer explains POC, pt gives thumbs up to start [address]. After RUE exercises complete writer starts working with LUE. Pt becomes resistant and shakes her head no. writer terminates tx. RN in room as writer departs. Will continue to follow for OT needs. THERON Rosario

## 2022-11-11 NOTE — PROGRESS NOTES
Patient's daughter called regarding consent for PICC line placement. Consent obtained and verified with writer and access Obdulio Russell.

## 2022-11-12 ENCOUNTER — APPOINTMENT (OUTPATIENT)
Dept: GENERAL RADIOLOGY | Age: 57
DRG: 720 | End: 2022-11-12
Payer: COMMERCIAL

## 2022-11-12 LAB
ALBUMIN SERPL-MCNC: 3.3 G/DL (ref 3.5–5.2)
ALP BLD-CCNC: 49 U/L (ref 35–104)
ALT SERPL-CCNC: 11 U/L (ref 5–33)
ANION GAP SERPL CALCULATED.3IONS-SCNC: 6 MMOL/L (ref 9–17)
AST SERPL-CCNC: 13 U/L
BILIRUB SERPL-MCNC: 0.3 MG/DL (ref 0.3–1.2)
BUN BLDV-MCNC: 25 MG/DL (ref 6–20)
CALCIUM SERPL-MCNC: 9 MG/DL (ref 8.6–10.4)
CHLORIDE BLD-SCNC: 95 MMOL/L (ref 98–107)
CO2: 39 MMOL/L (ref 20–31)
CREAT SERPL-MCNC: 0.46 MG/DL (ref 0.5–0.9)
GFR SERPL CREATININE-BSD FRML MDRD: >60 ML/MIN/1.73M2
GLUCOSE BLD-MCNC: 135 MG/DL (ref 65–105)
GLUCOSE BLD-MCNC: 145 MG/DL (ref 70–99)
GLUCOSE BLD-MCNC: 167 MG/DL (ref 65–105)
GLUCOSE BLD-MCNC: 232 MG/DL (ref 65–105)
GLUCOSE BLD-MCNC: 234 MG/DL (ref 65–105)
GLUCOSE BLD-MCNC: 246 MG/DL (ref 65–105)
HCT VFR BLD CALC: 47.4 % (ref 36–46)
HEMOGLOBIN: 15.3 G/DL (ref 12–16)
MAGNESIUM: 2.5 MG/DL (ref 1.6–2.6)
MCH RBC QN AUTO: 29.6 PG (ref 26–34)
MCHC RBC AUTO-ENTMCNC: 32.3 G/DL (ref 31–37)
MCV RBC AUTO: 91.5 FL (ref 80–100)
PDW BLD-RTO: 13.9 % (ref 11.5–14.9)
PHOSPHORUS: 2.6 MG/DL (ref 2.6–4.5)
PLATELET # BLD: 427 K/UL (ref 150–450)
PMV BLD AUTO: 7.7 FL (ref 6–12)
POTASSIUM SERPL-SCNC: 3.9 MMOL/L (ref 3.7–5.3)
RBC # BLD: 5.18 M/UL (ref 4–5.2)
SODIUM BLD-SCNC: 140 MMOL/L (ref 135–144)
TOTAL PROTEIN: 6.5 G/DL (ref 6.4–8.3)
WBC # BLD: 17.7 K/UL (ref 3.5–11)

## 2022-11-12 PROCEDURE — 99233 SBSQ HOSP IP/OBS HIGH 50: CPT | Performed by: INTERNAL MEDICINE

## 2022-11-12 PROCEDURE — 99232 SBSQ HOSP IP/OBS MODERATE 35: CPT | Performed by: INTERNAL MEDICINE

## 2022-11-12 PROCEDURE — 6360000002 HC RX W HCPCS: Performed by: INTERNAL MEDICINE

## 2022-11-12 PROCEDURE — 2500000003 HC RX 250 WO HCPCS: Performed by: INTERNAL MEDICINE

## 2022-11-12 PROCEDURE — 83735 ASSAY OF MAGNESIUM: CPT

## 2022-11-12 PROCEDURE — 6370000000 HC RX 637 (ALT 250 FOR IP): Performed by: INTERNAL MEDICINE

## 2022-11-12 PROCEDURE — 2580000003 HC RX 258: Performed by: INTERNAL MEDICINE

## 2022-11-12 PROCEDURE — 71045 X-RAY EXAM CHEST 1 VIEW: CPT

## 2022-11-12 PROCEDURE — 36415 COLL VENOUS BLD VENIPUNCTURE: CPT

## 2022-11-12 PROCEDURE — 94761 N-INVAS EAR/PLS OXIMETRY MLT: CPT

## 2022-11-12 PROCEDURE — 94640 AIRWAY INHALATION TREATMENT: CPT

## 2022-11-12 PROCEDURE — 80053 COMPREHEN METABOLIC PANEL: CPT

## 2022-11-12 PROCEDURE — 2700000000 HC OXYGEN THERAPY PER DAY

## 2022-11-12 PROCEDURE — 94660 CPAP INITIATION&MGMT: CPT

## 2022-11-12 PROCEDURE — 85027 COMPLETE CBC AUTOMATED: CPT

## 2022-11-12 PROCEDURE — 2000000000 HC ICU R&B

## 2022-11-12 PROCEDURE — 6360000002 HC RX W HCPCS: Performed by: NURSE PRACTITIONER

## 2022-11-12 PROCEDURE — 2500000003 HC RX 250 WO HCPCS

## 2022-11-12 PROCEDURE — 84100 ASSAY OF PHOSPHORUS: CPT

## 2022-11-12 RX ORDER — LISINOPRIL 20 MG/1
40 TABLET ORAL DAILY
Status: DISCONTINUED | OUTPATIENT
Start: 2022-11-12 | End: 2022-11-17 | Stop reason: HOSPADM

## 2022-11-12 RX ADMIN — METHYLPREDNISOLONE SODIUM SUCCINATE 40 MG: 40 INJECTION, POWDER, FOR SOLUTION INTRAMUSCULAR; INTRAVENOUS at 15:30

## 2022-11-12 RX ADMIN — MONTELUKAST 10 MG: 10 TABLET, FILM COATED ORAL at 20:14

## 2022-11-12 RX ADMIN — Medication 60 MG: at 08:18

## 2022-11-12 RX ADMIN — LISINOPRIL 40 MG: 20 TABLET ORAL at 15:32

## 2022-11-12 RX ADMIN — BENZONATATE 200 MG: 200 CAPSULE ORAL at 08:20

## 2022-11-12 RX ADMIN — INSULIN LISPRO 1 UNITS: 100 INJECTION, SOLUTION INTRAVENOUS; SUBCUTANEOUS at 23:56

## 2022-11-12 RX ADMIN — IPRATROPIUM BROMIDE AND ALBUTEROL SULFATE 1 AMPULE: 2.5; .5 SOLUTION RESPIRATORY (INHALATION) at 04:40

## 2022-11-12 RX ADMIN — METHYLPREDNISOLONE SODIUM SUCCINATE 40 MG: 40 INJECTION, POWDER, FOR SOLUTION INTRAMUSCULAR; INTRAVENOUS at 01:56

## 2022-11-12 RX ADMIN — DILTIAZEM HYDROCHLORIDE 30 MG: 30 TABLET, FILM COATED ORAL at 15:29

## 2022-11-12 RX ADMIN — HYDRALAZINE HYDROCHLORIDE 10 MG: 20 INJECTION INTRAMUSCULAR; INTRAVENOUS at 10:39

## 2022-11-12 RX ADMIN — CEFEPIME 2000 MG: 2 INJECTION, POWDER, FOR SOLUTION INTRAVENOUS at 15:33

## 2022-11-12 RX ADMIN — INSULIN LISPRO 1 UNITS: 100 INJECTION, SOLUTION INTRAVENOUS; SUBCUTANEOUS at 12:33

## 2022-11-12 RX ADMIN — IPRATROPIUM BROMIDE AND ALBUTEROL SULFATE 1 AMPULE: 2.5; .5 SOLUTION RESPIRATORY (INHALATION) at 15:47

## 2022-11-12 RX ADMIN — IPRATROPIUM BROMIDE AND ALBUTEROL SULFATE 1 AMPULE: 2.5; .5 SOLUTION RESPIRATORY (INHALATION) at 21:01

## 2022-11-12 RX ADMIN — I.V. FAT EMULSION 100 ML: 20 EMULSION INTRAVENOUS at 18:41

## 2022-11-12 RX ADMIN — IPRATROPIUM BROMIDE AND ALBUTEROL SULFATE 1 AMPULE: 2.5; .5 SOLUTION RESPIRATORY (INHALATION) at 11:24

## 2022-11-12 RX ADMIN — METHYLPREDNISOLONE SODIUM SUCCINATE 40 MG: 40 INJECTION, POWDER, FOR SOLUTION INTRAMUSCULAR; INTRAVENOUS at 20:05

## 2022-11-12 RX ADMIN — POTASSIUM CHLORIDE: 2 INJECTION, SOLUTION, CONCENTRATE INTRAVENOUS at 18:40

## 2022-11-12 RX ADMIN — CEFEPIME 2000 MG: 2 INJECTION, POWDER, FOR SOLUTION INTRAVENOUS at 23:33

## 2022-11-12 RX ADMIN — ENOXAPARIN SODIUM 40 MG: 100 INJECTION SUBCUTANEOUS at 08:20

## 2022-11-12 RX ADMIN — DOXYCYCLINE 100 MG: 100 CAPSULE ORAL at 20:14

## 2022-11-12 RX ADMIN — DEXMEDETOMIDINE HYDROCHLORIDE 0.2 MCG/KG/HR: 400 INJECTION INTRAVENOUS at 23:40

## 2022-11-12 RX ADMIN — METHYLPREDNISOLONE SODIUM SUCCINATE 40 MG: 40 INJECTION, POWDER, FOR SOLUTION INTRAMUSCULAR; INTRAVENOUS at 06:41

## 2022-11-12 RX ADMIN — DILTIAZEM HYDROCHLORIDE 30 MG: 30 TABLET, FILM COATED ORAL at 20:15

## 2022-11-12 RX ADMIN — METOPROLOL TARTRATE 5 MG: 5 INJECTION, SOLUTION INTRAVENOUS at 23:58

## 2022-11-12 RX ADMIN — DOXYCYCLINE 100 MG: 100 CAPSULE ORAL at 08:20

## 2022-11-12 RX ADMIN — SODIUM CHLORIDE, PRESERVATIVE FREE 10 ML: 5 INJECTION INTRAVENOUS at 08:26

## 2022-11-12 RX ADMIN — INSULIN LISPRO 1 UNITS: 100 INJECTION, SOLUTION INTRAVENOUS; SUBCUTANEOUS at 00:04

## 2022-11-12 RX ADMIN — HYDRALAZINE HYDROCHLORIDE 10 MG: 20 INJECTION INTRAMUSCULAR; INTRAVENOUS at 04:23

## 2022-11-12 RX ADMIN — CEFEPIME 2000 MG: 2 INJECTION, POWDER, FOR SOLUTION INTRAVENOUS at 06:08

## 2022-11-12 RX ADMIN — IPRATROPIUM BROMIDE AND ALBUTEROL SULFATE 1 AMPULE: 2.5; .5 SOLUTION RESPIRATORY (INHALATION) at 00:20

## 2022-11-12 RX ADMIN — Medication 60 MG: at 20:15

## 2022-11-12 RX ADMIN — HYDRALAZINE HYDROCHLORIDE 10 MG: 20 INJECTION INTRAMUSCULAR; INTRAVENOUS at 07:16

## 2022-11-12 RX ADMIN — METOPROLOL TARTRATE 5 MG: 5 INJECTION, SOLUTION INTRAVENOUS at 09:06

## 2022-11-12 RX ADMIN — BENZONATATE 200 MG: 200 CAPSULE ORAL at 20:14

## 2022-11-12 RX ADMIN — IPRATROPIUM BROMIDE AND ALBUTEROL SULFATE 1 AMPULE: 2.5; .5 SOLUTION RESPIRATORY (INHALATION) at 07:40

## 2022-11-12 RX ADMIN — SODIUM CHLORIDE, PRESERVATIVE FREE 10 ML: 5 INJECTION INTRAVENOUS at 20:06

## 2022-11-12 ASSESSMENT — PAIN SCALES - GENERAL
PAINLEVEL_OUTOF10: 0
PAINLEVEL_OUTOF10: 0

## 2022-11-12 NOTE — PROGRESS NOTES
[address] [employer]    PROGRESS NOTE             11/12/2022    7:22 AM    Name:   Gladys Richardson  MRN:     [id number]     Acct:      [de-identified]   Room:   2006/2006-01  IP Day:  4  Admit Date:  11/8/2022 11:16 AM    PCP:  Mulugeta Viveros DTR  Code Status:  Full Code    Subjective:     C/C:   Chief Complaint   Patient presents with    Dizziness    Cough    Shortness of Breath     Interval History Status: improved. Patient seen and examined at bedside  Talk to nurse covering about blood pressure measurements, she mentions they are taking it exclusively from right arm to maintain continuity, waiting on blood pressure readings to see if we will restart Cardene drip, patient is awake and alert so might give her home oral medication    Blood pressure this morning was 166/82, heart rate borderline tachycardic at 104 SPO2 above 88%    Brief History:      The patient is a 61year old non- female with a history of COPD (not on home oxygen), smoking (60 pack-years), hypertension, and hyperlipidemia who presents with shortness of breath worsening over the past week. The patient endorses a cough productive of green phlegm that has also worsened over the past week. She denies headache, dizziness, syncope, fever, chills, abdominal pain, nausea, vomiting, constipation and diarrhea. In the ED, the patient was hypoxic  (Sp02 58% on room air), tachycardic (108), and tachypneic (34). ABGs showed hypercapnia (pC02 58.3) and hypoxia (pO2 63.4). The patient was placed on BiPAP, but did not tolerate it and was placed on high flow nasal cannula. The patient was on 30 L high flow with 50% Fi02 and saturating 92%. The patient was given one dose of Solumedrol 125 mg and one dose of Duoneb nebulizer. The patient was given a bolus of IV normal saline. Respiratory panel was positive for adenovirus. IV ceftriaxone was started. Glucose was 158. Creatinine was 0.71.  WBCs were elevated at 18.4. Troponin was elevated at 56; repeat 50. EKG showed sinus tachycardia with occasional PVCs, possible LA enlargement, right superior axis deviation, and incomplete right bundle branch block, RV hypertrophy, T wave abnormality (possible inferior and anterolateral ischemia). CT chest was negative for pulmonary embolism, but showed multifocal tree-in-bud and nodular opacities throughout both lungs likely representing recurrent aspiration or atypical mycobacterium infection; mild emphysema, bronchiectasis and scarring along medial right upper lobe. The patient was admitted for management of acute on chronic hypoxic, hypercapnic respiratory failure secondary to COPD exacerbation and multifocal pneumonia. Pulmonology and infectious disease were consulted        Review of Systems:     Review of Systems   Unable to perform ROS: Other   Patient was not focused on answering questions, she was focused on her phone, even though she is alert and oriented according to nurse    Medications:      Allergies:  No Known Allergies    Current Meds:   Scheduled Meds:    [Held by provider] dilTIAZem  30 mg Oral 4 times per day    hydrALAZINE  10 mg IntraVENous Q6H    fat emulsion  100 mL IntraVENous Daily    insulin lispro  0-4 Units SubCUTAneous Q6H    ipratropium-albuterol  1 ampule Inhalation Q4H    [Held by provider] lisinopril  20 mg Oral Daily    doxycycline monohydrate  100 mg Oral 2 times per day    cefepime  2,000 mg IntraVENous Q8H    sodium chloride flush  5-40 mL IntraVENous 2 times per day    enoxaparin  40 mg SubCUTAneous Daily    [Held by provider] atorvastatin  40 mg Oral Daily    [Held by provider] montelukast  10 mg Oral Nightly    benzonatate  200 mg Oral TID    dextromethorphan  60 mg Oral 2 times per day    nicotine  1 patch TransDERmal Daily    methylPREDNISolone  40 mg IntraVENous Q6H     Continuous Infusions:    PN-Adult Premix 5/20 - Central 41.6 mL/hr at 11/12/22 0637    dextrose      niCARdipine dexmedetomidine 0.2 mcg/kg/hr (22 1084)    sodium chloride       PRN Meds: hydrALAZINE, metoprolol, sodium phosphate IVPB **OR** sodium phosphate IVPB **OR** sodium phosphate IVPB, glucose, dextrose bolus **OR** dextrose bolus, glucagon (rDNA), dextrose, perflutren lipid microspheres, midodrine, sodium chloride flush, sodium chloride flush, sodium chloride, ondansetron **OR** ondansetron, polyethylene glycol, acetaminophen **OR** acetaminophen    Data:     Past Medical History:   has a past medical history of Acute respiratory failure ([facility] [address] COPD (chronic obstructive pulmonary disease) ([facility] [address] Cough, Current every day smoker, Dyspnea, Heart murmur, Hypertension, and Shortness of breath. Social History:   reports that she has been smoking cigarettes. She has been smoking an average of .5 packs per day. She has never used smokeless tobacco. She reports that she does not drink alcohol and does not use drugs. Family History: History reviewed. No pertinent family history. Vitals:  BP (!) 203/84   Pulse 94   Temp 98.1 °F (36.7 °C)   Resp 22   Ht 4' 11\" (1.499 m)   Wt 129 lb 3 oz (58.6 kg)   SpO2 94%   BMI 26.09 kg/m²   Temp (24hrs), Av.4 °F (36.9 °C), Min:97.8 °F (36.6 °C), Max:99.3 °F (37.4 °C)    Recent Labs     22  1615 22  2351 22  0604   POCGLU 135* 232* 167*       I/O(24Hr):     Intake/Output Summary (Last 24 hours) at 2022 5008  Last data filed at 2022 2515  Gross per 24 hour   Intake 1363.84 ml   Output 150 ml   Net 1213.84 ml       Labs:  [unfilled]    Lab Results   Component Value Date/Time    SPECIAL NOT REPORTED 10/28/2019 07:58 PM     Lab Results   Component Value Date/Time    CULTURE NORMAL RESPIRATORY DAVID MODERATE GROWTH 2022 10:25 PM       [unfilled]    Radiology:    XR CHEST PORTABLE    Result Date: 11/10/2022  EXAMINATION: ONE XRAY VIEW OF THE CHEST 11/10/2022 7:46 am COMPARISON: 2022, 10/29/2019 HISTORY: ORDERING SYSTEM PROVIDED HISTORY: SOB TECHNOLOGIST PROVIDED HISTORY: SOB Reason for Exam: dyspnea FINDINGS: Interstitial opacities with mild septal thickening has increased in the interval.  Perihilar opacities are noted as well. No pneumothorax or significant effusion appreciated. Cardiac and mediastinal contours appear unchanged. Increasing interstitial opacities, which may represent developing edema versus inflammatory process or atypical infection. XR CHEST PORTABLE    Result Date: 11/8/2022  EXAMINATION: ONE XRAY VIEW OF THE CHEST 11/8/2022 8:37 am COMPARISON: 10/19/2019 HISTORY: ORDERING SYSTEM PROVIDED HISTORY: sob TECHNOLOGIST PROVIDED HISTORY: sob Reason for Exam: sob FINDINGS: Cardial pericardial silhouette is prominent but stable. Increased interstitial opacities noted bilaterally. Focal infiltrate is not identified. No pneumothorax. No free air. No acute bony abnormality. Increased interstitial opacity. While much or all of this may be chronic, please correlate with any clinical evidence of acute interstitial edema. CT CHEST PULMONARY EMBOLISM W CONTRAST    Result Date: 11/8/2022  EXAMINATION: CTA OF THE CHEST 11/8/2022 12:44 pm TECHNIQUE: CTA of the chest was performed after the administration of intravenous contrast.  Multiplanar reformatted images are provided for review. MIP images are provided for review. Automated exposure control, iterative reconstruction, and/or weight based adjustment of the mA/kV was utilized to reduce the radiation dose to as low as reasonably achievable.  COMPARISON: 10/31/2019 HISTORY: ORDERING SYSTEM PROVIDED HISTORY: odette tachycardia TECHNOLOGIST PROVIDED HISTORY: odette tachycardia Decision Support Exception - unselect if not a suspected or confirmed emergency medical condition->Emergency Medical Condition ([place]) Reason for Exam: sob, COPD 71-year-old female with shortness of breath, COPD, tachycardia FINDINGS: Pulmonary Arteries: No obvious filling defect in the pulmonary arterial vasculature that meets the criteria for pulmonary embolus within the limitations of this study. Evaluation of the pulmonary arterial vasculature is limited due to streak artifact from the contrast bolus in the SVC, suboptimal bolus timing, and respiratory motion. Mediastinum: Visualized thyroid gland grossly unremarkable in appearance. Atheromatous plaque and atherosclerotic calcification of the aorta. Coronary artery disease. Bilateral hilar lymph node enlargement. No axillary lymphadenopathy. No mediastinal lymphadenopathy. No dissection flap within the visualized thoracic aorta. No pericardial or pleural effusions. No periaortic or mediastinal hemorrhage. Lungs/pleura: Trachea and proximal central airways appear patent. Respiratory motion throughout both lungs. Mild emphysema. Bronchiectasis and scarring along the medial right upper lobe. Tree-in-bud and nodular opacities throughout the bilateral upper lobes. Similar findings are seen within the right middle lobe, lingula, and superior segments of the lower lobes as well as the anteroinferior lower lobes. Upper Abdomen: Fatty liver. Atheromatous plaque and atherosclerotic calcification of the upper abdominal aorta and branch vasculature. Evaluation of the upper abdomen is limited due to respiratory motion. Soft Tissues/Bones: Mild diffuse degenerative changes throughout the spine. 1. No clear evidence for central pulmonary embolus within the limitations of this study. 2. Multifocal tree-in-bud and nodular opacities throughout both lungs as detailed above likely representing sequela of recurrent aspiration or atypical mycobacterial infection. Follow-up is recommended to document resolution. 3. Mild emphysema. Respiratory motion. Bronchiectasis and scarring along the medial right upper lobe. 4. Atheromatous plaque and atherosclerotic calcification of the aorta. Coronary artery disease.  5. Bilateral hilar lymph node enlargement, likely reactive. 6. Fatty liver. VL Upper Extremity Venous Duplex Left    Result Date: 11/11/2022    [facility]  Vascular Upper Extremities Veins Procedure   Patient Name   Britney DAUGHERTY  Date of Study           11/10/2022   Date of Birth  1965  Gender                  Female   Age            61 year(s)  Race                       Room Number    2006        Height:                 59 inch, 149.86 cm   Corporate ID # [id number]    Weight:                 128 pounds, 58.1 kg   Patient Acct # [de-identified]   BSA:        1.53 m^2    BMI:       25.85 kg/m^2   MR #           [id number]      Sonographer             Melita Molina RVT   Accession #    [id number]  Interpreting Physician  [address] [place] [address]   Referring                  Referring Physician     Bassam Fajardo  Nurse  Practitioner  Procedure Type of Study:   Veins: Upper Extremities Veins, Venous Scan Upper Left. Indications for Study:R/O DVT. Patient Status: In Patient. Technical Quality:Adequate visualization. Conclusions   Summary   No evidence of superficial or deep venous thrombosis in the left upper  extremity. Signature   ----------------------------------------------------------------  Electronically signed by Melita Molina RVT(Sonographer) on  11/10/2022 12:49 PM  ----------------------------------------------------------------   ----------------------------------------------------------------  Electronically signed by Delila Pilot Reyes,Arthur(Interpreting  physician) on 11/11/2022 02:42 PM  ----------------------------------------------------------------  Findings:   Right Impression:          Left Impression:  Right subclavian vein is   Left internal jugular, subclavian, axillary,  compressible with normal   brachial, ulnar, radial, cephalic and basilic  doppler responses. veins are compressible with normal doppler                             responses.   Velocities are measured in cm/s ; Diameters are measured in cm Right UE Vein Measurements 2D Measurements +---------------+-----------------+----------------------+-----------------+ ! Location       ! Visualized       ! Compressibility       ! Thrombosis       ! +---------------+-----------------+----------------------+-----------------+ ! Dist SCV       ! Yes              ! Yes                   ! None             ! +---------------+-----------------+----------------------+-----------------+ Left UE Vein Measurements 2D Measurements +------------------------------------+----------+---------------+----------+ ! Location                            ! Visualized! Compressibility! Thrombosis! +------------------------------------+----------+---------------+----------+ ! Prox IJV                            ! Yes       ! Yes            ! None      ! +------------------------------------+----------+---------------+----------+ ! Dist IJV                            ! Yes       ! Yes            ! None      ! +------------------------------------+----------+---------------+----------+ ! Prox SCV                            ! Yes       ! Yes            ! None      ! +------------------------------------+----------+---------------+----------+ ! Dist SCV                            ! Yes       ! Yes            ! None      ! +------------------------------------+----------+---------------+----------+ ! Prox Axillary                       ! Yes       ! Yes            ! None      ! +------------------------------------+----------+---------------+----------+ ! Dist Axillary                       ! Yes       ! Yes            ! None      ! +------------------------------------+----------+---------------+----------+ ! Prox Brachial                       !Yes       ! Yes            ! None      ! +------------------------------------+----------+---------------+----------+ ! Dist Brachial                       !Yes       ! Yes            ! None      ! +------------------------------------+----------+---------------+----------+ ! Prox Radial !Yes       !Yes            ! None      ! +------------------------------------+----------+---------------+----------+ ! Dist Radial                         !Yes       ! Yes            ! None      ! +------------------------------------+----------+---------------+----------+ ! Prox Ulnar                          ! Yes       ! Yes            ! None      ! +------------------------------------+----------+---------------+----------+ ! Dist Ulnar                          ! Yes       ! Yes            ! None      ! +------------------------------------+----------+---------------+----------+ ! Basilic at UA                       ! Yes       ! Yes            ! None      ! +------------------------------------+----------+---------------+----------+ ! Basilic at AF                       ! Yes       ! Yes            ! None      ! +------------------------------------+----------+---------------+----------+ ! Basilic at [address]                       ! Yes       ! Yes            ! None      ! +------------------------------------+----------+---------------+----------+ ! Cephalic at UA                      ! Yes       ! Yes            ! None      ! +------------------------------------+----------+---------------+----------+ ! Cephalic at AF                      ! Yes       ! Yes            ! None      ! +------------------------------------+----------+---------------+----------+ ! Cephalic at [address]                      ! Yes       ! Yes            ! None      ! +------------------------------------+----------+---------------+----------+        Physical Examination:        Physical Exam  Constitutional:       General: She is not in acute distress. Appearance: She is ill-appearing. HENT:      Head: Atraumatic. Nose: No congestion. Mouth/Throat:      Mouth: Mucous membranes are moist.   Cardiovascular:      Rate and Rhythm: Normal rate and regular rhythm. Heart sounds: No murmur heard. Pulmonary:      Effort: Respiratory distress present.       Breath sounds: Normal breath sounds. No wheezing. Chest:      Chest wall: No tenderness. Abdominal:      General: Abdomen is flat. Bowel sounds are normal. There is no distension. Palpations: Abdomen is soft. There is no mass. Tenderness: There is no abdominal tenderness. Hernia: No hernia is present. Musculoskeletal:      Cervical back: No rigidity or tenderness. Skin:     Coloration: Skin is not pale. Neurological:      General: No focal deficit present. Psychiatric:         Mood and Affect: Mood normal.         Assessment:        Primary Problem  Acute respiratory failure [facility])    Active Hospital Problems    Diagnosis Date Noted    Acute respiratory failure ([facility] [address] [J96.00] 11/08/2022     Priority: Medium       Plan:         Acute on chronic hypoxic, hypercapnic respiratory failure 2/2 COPD exacerbation and multifocal pneumonia r/o TB  -ABGs: pH 7.325, pC02 81.2, p02 70.6; HC03 42.3  -Patient alternating between BiPAP and nasal cannula, currently on 6 L nasal flow  -CXR: increased interstitial opacity (edema vs. Atypical resp. Infection)  -CT PE: multifocal tree-in-bud and nodular opacities throughout both lungs likely representing recurrent aspiration or atypical mycobacterium infection; mild emphysema, bronchiectasis and scarring along medial right upper lobe; Lt. subclavian artery stenosis vs. occlusion  -WBCs on admission 18.4; today:17.9  -Respiratory viral panel: positive for adenovirus  -Respiratory culture normal valdez  -Legionella negative, S.  Pneumoniae negative, Mycoplasma negative  -QuantiFERON gold test: negative   -Pro-BNP: 1,874  -Echo showed EF of about 55%  -Pro-calcitonin 0.10  -Lactic acid 0.8  -Lasix 20 mg IV once given yesterday  -Continue Cefepime 2000 mg IV q 8 for 7 days   -Continue Doxycycline 100 mg po q 12 hours for 7 days   -Continue DuoNeb aerols  -Continue anti-tussives: Benzozonate, Delsym   -QuantiFERON-TB test negative  -Continue Singulair 10 mg po nightly -Pulmonary want to continue BiPAP, but will give her time off so she can take her medications. I also want to limit Precedex usage. May require intubation. pulm started 30 mg Cardizem tabs, but held due to fluctuations in blood pressure. -ID want to continue supportive care       Hyperkalemia (resolved)  -K+ today 3.0  -Mg 2.5  -Continue to monitor      Hyperlipidemia  -Continue Lipitor 40 mg po daily      Hypertension  -BP fluctuating, unclear whether true blood pressure is high due to her blood pressure readings variation  -Continue Hydralazine 10 mg IV q 6 hours PRN  -Continue Lisinopril held until true blood pressure readings confirmed  -Cardizem started but held due to fluctuations in blood pressure     Contact and droplet isolation   DVT prophylaxis: Lovenox 40 mg SC daily   Code: Full code  Diet: Regular adult diet   PT/OT/SW consulted    Bonnie Cervantes MD  11/12/2022  7:22 AM     Attending Physician Statement  I have discussed the care of Tushar Wade and I have examined the patient myselft and taken ros and hpi , including pertinent history and exam findings,  with the resident. I have reviewed the key elements of all parts of the encounter with the resident. I agree with the assessment, plan and orders as documented by the resident.   Acute on chronic respiratory failure secondary to COPD hypoxic hypercarbic acute toxic metabolic encephalopathy secondary to above now improving we will taper TPN as patient improves her oral intake  Hypertensive urgency blood pressure over 180 restarted losartan and Cardizem as patient is taking oral now if fails to respond will need a Cardene drip      Electronically signed by Marian Calderon MD

## 2022-11-12 NOTE — PLAN OF CARE
Problem: Discharge Planning  Goal: Discharge to home or other facility with appropriate resources  11/11/2022 2122 by Bebeto Koroma RN  Outcome: Progressing  Flowsheets (Taken 11/11/2022 1600 by Alix Andrew RN)  Discharge to home or other facility with appropriate resources: Identify barriers to discharge with patient and caregiver  11/11/2022 1400 by Alix Andrew RN  Outcome: Progressing  Flowsheets  Taken 11/11/2022 1200  Discharge to home or other facility with appropriate resources: Identify barriers to discharge with patient and caregiver  Taken 11/11/2022 0800  Discharge to home or other facility with appropriate resources: Identify barriers to discharge with patient and caregiver     Problem: Safety - Adult  Goal: Free from fall injury  11/11/2022 2122 by Bebeto Koroma RN  Outcome: Progressing  11/11/2022 1400 by Alix Andrew RN  Outcome: Progressing  Note: Patient remains free from falls. Problem: Pain  Goal: Verbalizes/displays adequate comfort level or baseline comfort level  11/11/2022 2122 by Bebeto Koroma RN  Outcome: Progressing  Flowsheets (Taken 11/11/2022 1600 by Alix Anrdew RN)  Verbalizes/displays adequate comfort level or baseline comfort level: Assess pain using appropriate pain scale  11/11/2022 1400 by Alix Andrew RN  Outcome: Progressing  Flowsheets  Taken 11/11/2022 1200  Verbalizes/displays adequate comfort level or baseline comfort level: Assess pain using appropriate pain scale  Taken 11/11/2022 0800  Verbalizes/displays adequate comfort level or baseline comfort level: Assess pain using appropriate pain scale  Note: Pain assessed at regular intervals. Patient denies having any pain.       Problem: Respiratory - Adult  Goal: Achieves optimal ventilation and oxygenation  11/11/2022 2122 by Bebeto Koroma RN  Outcome: Progressing  Flowsheets (Taken 11/11/2022 1600 by Alix Andrew RN)  Achieves optimal ventilation and oxygenation: Assess for changes in mentation and behavior  11/11/2022 1400 by Farhat Ovalle RN  Outcome: Progressing  Flowsheets  Taken 11/11/2022 1200  Achieves optimal ventilation and oxygenation: Assess for changes in respiratory status  Taken 11/11/2022 0800  Achieves optimal ventilation and oxygenation:   Assess for changes in mentation and behavior   Assess for changes in respiratory status  Note: Patient placed on 6L salter for short period of time but placed back on bipap due to increased WOB.       Problem: Cardiovascular - Adult  Goal: Maintains optimal cardiac output and hemodynamic stability  11/11/2022 2122 by Wilfrid Joaquin RN  Outcome: Progressing  Flowsheets (Taken 11/11/2022 1600 by Farhat Ovalle RN)  Maintains optimal cardiac output and hemodynamic stability: Monitor blood pressure and heart rate  11/11/2022 1400 by Farhat Ovalle RN  Outcome: Progressing  Flowsheets (Taken 11/11/2022 0800)  Maintains optimal cardiac output and hemodynamic stability: Monitor blood pressure and heart rate  Goal: Absence of cardiac dysrhythmias or at baseline  11/11/2022 2122 by Wilfrid Joaquin RN  Outcome: [id number] (Taken 11/11/2022 1600 by Farhat Ovalle RN)  Absence of cardiac dysrhythmias or at baseline: Monitor cardiac rate and rhythm  11/11/2022 1400 by Farhat Ovalle RN  Outcome: Progressing     Problem: Skin/Tissue Integrity - Adult  Goal: Skin integrity remains intact  11/11/2022 2122 by Wilfrid Joaquin RN  Outcome: Progressing  Flowsheets  Taken 11/11/2022 2122 by Wilfrid Joaquin RN  Skin Integrity Remains Intact:   Monitor for areas of redness and/or skin breakdown   Assess vascular access sites hourly  Taken 11/11/2022 2111 by Wilfrid Joaquin RN  Skin Integrity Remains Intact:   Monitor for areas of redness and/or skin breakdown   Assess vascular access sites hourly  Taken 11/11/2022 2000 by Wilfrid Joaquin RN  Skin Integrity Remains Intact:   Monitor for areas of redness and/or skin breakdown   Assess vascular access sites hourly  Taken 11/11/2022 1600 by Brigette Mix RN  Skin Integrity Remains Intact: Monitor for areas of redness and/or skin breakdown  11/11/2022 1400 by Brigette Mix RN  Outcome: Progressing  Flowsheets  Taken 11/11/2022 1200  Skin Integrity Remains Intact: Monitor for areas of redness and/or skin breakdown  Taken 11/11/2022 0800  Skin Integrity Remains Intact: Monitor for areas of redness and/or skin breakdown  Note: Patient able to turn self in bed. No breakdown noted. Goal: Oral mucous membranes remain intact  11/11/2022 2122 by Naveen Mata RN  Outcome: Progressing  Flowsheets (Taken 11/11/2022 1600 by Brigette Mix RN)  Oral Mucous Membranes Remain Intact: Assess oral mucosa and hygiene practices  11/11/2022 1400 by Brigette Mix RN  Outcome: Progressing  Flowsheets (Taken 11/11/2022 0800)  Oral Mucous Membranes Remain Intact: Assess oral mucosa and hygiene practices     Problem: Death & Dying  Goal: Pt/Family communicate acceptance of impending death and feel psychological comfort and peace  Description: INTERVENTIONS:  1. Assess patient/family anxiety and grief process related to end of life issues  2. Provide emotional and spiritual support  3. Provide information about the patient's health status with consideration of family and cultural values  4. Communicate willingness to discuss death and facilitate grief process  with patient/family as appropriate  5. Emphasize sustaining relationships within family system and community, or amy/spiritual traditions  6.  Initiate Spiritual Care, Psychosocial Clinical Specialist, consult as needed  11/11/2022 2122 by Naveen Mata RN  Outcome: Progressing  Flowsheets (Taken 11/11/2022 1600 by Brigette Mix RN)  Patient/family communicates acceptance of loss or impending death and feels physical/psychological comfort and peace: Assess patient/family anxiety and grief process related to end of life issues  11/11/2022 1400 by Brigette Mix RN  Outcome: Progressing  Flowsheets (Taken 11/11/2022 0800)  Patient/family communicates acceptance of loss or impending death and feels physical/psychological comfort and peace: Assess patient/family anxiety and grief process related to end of life issues     Problem: Decision Making  Goal: Pt/Family able to effectively weigh alternatives and participate in decision making related to treatment and care  Description: INTERVENTIONS:  1. Determine when there are differences between patient's view, family's view, and healthcare provider's view of condition  2. Facilitate patient and family articulation of goals for care  3. Help patient and family identify pros/cons of alternative solutions  4. Provide information as requested by patient/family  5. Respect patient/family right to receive or not to receive information  6. Serve as a liaison between patient and family and health care team  7.  Initiate Consults from Ethics, Palliative Care or initiate [facility] as is appropriate  11/11/2022 2122 by Agustín Alvarez RN  Outcome: Progressing  11/11/2022 1400 by Michelle Canela RN  Outcome: Progressing  Flowsheets (Taken 11/11/2022 0800)  Patient/family able to effectively weigh alternatives and participate in decision making related to treatment and care: Determine when there are differences between patient's view, family's view, and healthcare provider's view of condition     Problem: Infection - Adult  Goal: Absence of infection at discharge  11/11/2022 2122 by Agustín Alvarez RN  Outcome: Progressing  Flowsheets (Taken 11/11/2022 1600 by Michelle Canela RN)  Absence of infection at discharge: Assess and monitor for signs and symptoms of infection  11/11/2022 1400 by Michelle Canela RN  Outcome: Progressing  Flowsheets  Taken 11/11/2022 1200  Absence of infection at discharge: Assess and monitor for signs and symptoms of infection  Taken 11/11/2022 0800  Absence of infection at discharge: Assess and monitor for signs and symptoms of infection  Goal: Absence of fever/infection during anticipated neutropenic period  11/11/2022 2122 by Celina Narayanan RN  Outcome: Progressing  Flowsheets (Taken 11/11/2022 1600 by Cipriano Aguilera RN)  Absence of fever/infection during anticipated neutropenic period: Monitor white blood cell count  11/11/2022 1400 by Cipriano Aguilera RN  Outcome: Progressing  Flowsheets  Taken 11/11/2022 1200  Absence of fever/infection during anticipated neutropenic period: Monitor white blood cell count  Taken 11/11/2022 0800  Absence of fever/infection during anticipated neutropenic period: Monitor white blood cell count     Problem: Skin/Tissue Integrity  Goal: Absence of new skin breakdown  Description: 1. Monitor for areas of redness and/or skin breakdown  2. Assess vascular access sites hourly  3. Every 4-6 hours minimum:  Change oxygen saturation probe site  4. Every 4-6 hours:  If on nasal continuous positive airway pressure, respiratory therapy assess nares and determine need for appliance change or resting period. 11/11/2022 2122 by Celina Narayanan RN  Outcome: Progressing  Note: Encourage pt to reposition frequently, brief check q  1 hr and PRN, barrier cream as needed. Educated pt on importance of repositioning . Will cont to monitor. Assist with repositioning as needed  11/11/2022 1400 by Cipriano Aguilera RN  Outcome: Progressing  Note: Patient turned and repositioned every two hours. No new breakdown noted.       Problem: ABCDS Injury Assessment  Goal: Absence of physical injury  11/11/2022 2122 by Celina Narayanan RN  Outcome: Progressing  Flowsheets (Taken 11/11/2022 2122)  Absence of Physical Injury: Implement safety measures based on patient assessment  11/11/2022 1400 by Cipriano Aguilera RN  Outcome: Progressing     Problem: Nutrition Deficit:  Goal: Optimize nutritional status  Outcome: Progressing

## 2022-11-12 NOTE — PROGRESS NOTES
Comprehensive Nutrition Assessment    Type and Reason for Visit:  Reassess    Nutrition Recommendations/Plan:   Continue current diet. Provide Glucerna with Meals. Continue lipids and TPN with the following adjustments to additives: K Cl: 0 to 20 mEq, Calcium Gluconate: ) to 4 mEq, remove MVI and Trace Elements. Malnutrition Assessment:  Malnutrition Status: At risk for malnutrition (Comment) (due to breathing difficulty & spitting food out) (11/11/22 1927)    Context:  Acute Illness     Findings of the 6 clinical characteristics of malnutrition:  Energy Intake:  50% or less of estimated energy requirements for 5 or more days  Weight Loss:  Unable to assess     Body Fat Loss:  Unable to assess     Muscle Mass Loss:  Unable to assess    Fluid Accumulation:  No significant fluid accumulation     Strength:  Not Performed    Nutrition Assessment:    Pt not on BiPAP at time of visit. Pt states she was able to eat a couple of bites of solid food at breakfast, drank juice and Diet Pop. Willing to receive oral nutrition supplements. TPN to continue unless otherwise notified. Nutrition Related Findings:    Edema: +1 RUE, LUE, facial. Labs and meds reviewed. Wound Type: None       Current Nutrition Intake & Therapies:    Average Meal Intake: 1-25%     ADULT DIET; Regular  PN-ADULT PREMIX 5/20 - CENTRAL  PN-ADULT PREMIX 5/20 - CENTRAL  ADULT ORAL NUTRITION SUPPLEMENT; Breakfast, Lunch, Dinner; Diabetic Oral Supplement  Current Parenteral Nutrition Orders:  Type and Formula: 2-in-1 Custom   Lipids: 100ml  Duration: Continuous  Rate/Volume: 41.6 ml/ 998 ml  Current PN Order Provides: 1080 kcal, 50 gm protein (inlcuding lipids)    Anthropometric Measures:  Height: 4' 11\" (149.9 cm)  Ideal Body Weight (IBW): 95 lbs (43 kg)    Admission Body Weight: 128 lb (58.1 kg)  Current Body Weight: 129 lb 3 oz (58.6 kg), 134.7 % IBW.  Weight Source: Bed Scale  Current BMI (kg/m2): 26.1  Usual Body Weight: 162 lb (73.5 kg) (11-1-2019)  % Weight Change (Calculated): -21                    BMI Categories: Overweight (BMI 25.0-29. 9)    Estimated Daily Nutrient Needs:  Energy Requirements Based On: Kcal/kg  Weight Used for Energy Requirements: Current  Energy (kcal/day): 0311-6880 kcals based on 25-27 kcals/kg  Weight Used for Protein Requirements: Current  Protein (g/day): 76-82 gm protein based on 1.3-1.4 gm/kg      Nutrition Diagnosis:   Inadequate oral intake related to impaired respiratory function as evidenced by intake 0-25%    Nutrition Interventions:   Food and/or Nutrient Delivery: Continue Current Diet, Start Oral Nutrition Supplement, Modify Parenteral Nutrition  Nutrition Education/Counseling: No recommendation at this time  Coordination of Nutrition Care: Continue to monitor while inpatient       Goals:  Previous Goal Met: Progressing toward Goal(s)  Goals: Tolerate nutrition support at goal rate, PO intake 50% or greater       Nutrition Monitoring and Evaluation:   Behavioral-Environmental Outcomes: None Identified  Food/Nutrient Intake Outcomes: Food and Nutrient Intake, Supplement Intake, Parenteral Nutrition Intake/Tolerance  Physical Signs/Symptoms Outcomes: Biochemical Data, Chewing or Swallowing, GI Status, Nutrition Focused Physical Findings, Skin, Weight    Discharge Planning:     Too soon to determine     Cleo Rivers, JUAN M, LD  Contact: [phone]

## 2022-11-12 NOTE — CARE COORDINATION
ONGOING DISCHARGE PLAN:    Writer reviewed chart notes. Pt. Is from home. VNS has been denied. Pt. Is + for Adenovirus. Remains on IV Cefepime/ PO Doxy, WBC 17.7. ID on board. Off Precedex. Pulmonary following. Remains on IV steroids, 40 Q6. Elevated BP today, 201/100. Cardene GTT. Follow for Home Oxygen needs, as well as a new Nebulizer. Will continue to follow for additional discharge needs.     Electronically signed by Junior Ni RN on 11/12/2022 at 4:52 PM

## 2022-11-12 NOTE — PROGRESS NOTES
Writer spoke with Dr. Moon Junior regarding patient's high blood pressure. Will discuss with team regarding starting PO medications or Cardene drip.

## 2022-11-12 NOTE — PROGRESS NOTES
[facility] -   Infectious diseases evaluation  admission date 11/8/2022    reason for consultation:   Atypical pneumonia    Impression :   Current:  Adenovirus respiratory infection with possible superimposed bacterial infection. Acute COPD exacerbation  Hyponatremia  Chronic hypercapnic respiratory failure  History of smoking    Recommendations   QuantiFERON-TB test was negative  IV cefepime and doxycycline  Nasal swab for MRSA was negative  Procalcitonin level was 0.1 and lactic acid was 0.8 on 11/10/2022  Mycoplasma IgM negative  Legionella urine antigen negative  Normal valdez growth on respiratory culture  HIV screen negative  Respiratory panel was positive for adenovirus PCR  Discussed with nursing staff  Continue supportive care    Infection Control Recommendations   [place] Precautions  Droplet Isolation      Antimicrobial Stewardship Recommendations   Simplification of therapy  Targeted therapy      History of Present Illness:   Initial history:  Mitch Pope is a 61y.o.-year-old female with history of smoking, COPD, chronic hypercapnic respiratory failure, noncompliant with home medication. She presented to hospital with worsening shortness of breath associated with cough productive of green phlegm for several days, symptoms moderate to severe. CT chest was negative for pulmonary embolism but showed multiple reticular nodule infiltrates in the upper lobes right more than left. Respiratory panel was positive for adenovirus PCR. The patient denied sick contact, no recent travel, denied hemoptysis, no chronic fatigue or night sweats. She is actively smoking, denied alcohol or drug abuse.     Interval changes  11/12/2022   Her mental status improved today, off Precedex drip, afebrile, blood pressure on the high side, comfortable on oxygen via nasal cannula, no acute events  Patient Vitals for the past 8 hrs:   BP Temp Temp src Pulse Resp SpO2   11/12/22 exacerbation    COPD (chronic obstructive pulmonary disease) (HCC)     with hypoxia    Cough     Current every day smoker     Dyspnea     Heart murmur     as a child    Hypertension     Shortness of breath        Past Surgical  History:     Past Surgical History:   Procedure Laterality Date     SECTION         Medications:      [Held by provider] dilTIAZem  30 mg Oral 4 times per day    hydrALAZINE  10 mg IntraVENous Q6H    fat emulsion  100 mL IntraVENous Daily    insulin lispro  0-4 Units SubCUTAneous Q6H    ipratropium-albuterol  1 ampule Inhalation Q4H    [Held by provider] lisinopril  20 mg Oral Daily    doxycycline monohydrate  100 mg Oral 2 times per day    cefepime  2,000 mg IntraVENous Q8H    sodium chloride flush  5-40 mL IntraVENous 2 times per day    enoxaparin  40 mg SubCUTAneous Daily    [Held by provider] atorvastatin  40 mg Oral Daily    [Held by provider] montelukast  10 mg Oral Nightly    benzonatate  200 mg Oral TID    dextromethorphan  60 mg Oral 2 times per day    nicotine  1 patch TransDERmal Daily    methylPREDNISolone  40 mg IntraVENous Q6H       Social History:     Social History     Socioeconomic History    Marital status: Legally      Spouse name: Not on file    Number of children: Not on file    Years of education: Not on file    Highest education level: Not on file   Occupational History    Not on file   Tobacco Use    Smoking status: Every Day     Packs/day: 0.50     Types: Cigarettes    Smokeless tobacco: Never   Vaping Use    Vaping Use: Some days   Substance and Sexual Activity    Alcohol use: No    Drug use: No    Sexual activity: Not on file   Other Topics Concern    Not on file   Social History Narrative    Not on file     Social Determinants of Health     Financial Resource Strain: Not on file   Food Insecurity: Not on file   Transportation Needs: Not on file   Physical Activity: Not on file   Stress: Not on file   Social Connections: Not on file   Intimate Partner Violence: Not on file   Housing Stability: Not on file       Family History:   History reviewed. No pertinent family history. Medical Decision Making:   I have independently reviewed/ordered the following labs:    CBC with Differential:   Recent Labs     11/11/22  0507 11/12/22 0417   WBC 17.9* 17.7*   HGB 15.5 15.3   HCT 48.2* 47.4*    427       BMP:  Recent Labs     11/11/22  1035 11/12/22 0417    140   K 4.8 3.9   CL 92* 95*   CO2 36* 39*   BUN 21* 25*   CREATININE 0.49* 0.46*   MG 2.5 2.5       Hepatic Function Panel:   Recent Labs     11/11/22  1035 11/12/22 0417   PROT  --  6.5   LABALBU 3.4* 3.3*   BILITOT  --  0.3   ALKPHOS  --  49   ALT  --  11   AST  --  13     No results for input(s): RPR in the last 72 hours. No results for input(s): HIV in the last 72 hours. No results for input(s): BC in the last 72 hours. Lab Results   Component Value Date/Time    CREATININE 0.46 11/12/2022 04:17 AM    GLUCOSE 145 11/12/2022 04:17 AM       Detailed results: Thank you for allowing us to participate in the care of this patient. Please call with questions. This note is created with the assistance of a speech recognition program.  While intending to generate adocument that actually reflects the content of the visit, the document can still have some errors including those of syntax and sound a like substitutions which may escape proof reading. It such instances, actual meaningcan be extrapolated by contextual diversion.     Lamont Morales MD  Office: [phone]  Perfect serve / office [phone]

## 2022-11-12 NOTE — PROGRESS NOTES
Patient does not verbally answer questions this shift. She will nod head yes or no to answer questions. Does not follow commands but moves all extremities purposefully per herself. Repositions self in bed. Will cont to monitor.

## 2022-11-12 NOTE — PROGRESS NOTES
11/12/22 1216   Encounter Summary   Encounter Overview/Reason  Spiritual/Emotional Needs   Service Provided For: Patient   Referral/Consult From: Rounding   Complexity of Encounter Low   Spiritual/Emotional needs   Type Spiritual Support   Assessment/Intervention/Outcome   Assessment Unable to assess   Intervention Prayer (assurance of)/[place]

## 2022-11-12 NOTE — PROGRESS NOTES
Writer spoke with Resident team again regarding elevated blood pressure. Team to be rounding will notify writer of orders for blood pressure.

## 2022-11-12 NOTE — PROGRESS NOTES
Pulmonary Progress Note  [facility]ists      Patient - Whitley Rosa,  Age - 61 y.o.    - 1965      Room Number -    MRN -  [id number]   [facility]  Date of Admission -  2022 11:16 AM        Foster Arevalo MD  Primary Care Physician - Lizbet Holden, DTR     SUBJECTIVE   Patient appears to be in fair spirits. Per nursing staff, much more lucid than yesterday. Off the Precedex drip. .    OBJECTIVE   VITALS    height is 4' 11\" (1.499 m) and weight is 129 lb 3 oz (58.6 kg). Her temperature is 98.1 °F (36.7 °C). Her blood pressure is 183/78 (abnormal) and her pulse is 88. Her respiration is 26 and oxygen saturation is 90%. Body mass index is 26.09 kg/m². Temperature Range: Temp: 98.1 °F (36.7 °C) Temp  Av.5 °F (36.9 °C)  Min: 98.1 °F (36.7 °C)  Max: 99.3 °F (37.4 °C)  BP Range:  Systolic (30WXY), LYO:314 , Min:108 , UMQ:914     Diastolic (73AFV), GOY:23, Min:49, Max:141    Pulse Range: Pulse  Av.8  Min: 63  Max: 108  Respiration Range: Resp  Av.9  Min: 17  Max: 29  Current Pulse Ox[de-identified]  SpO2: 90 %  24HR Pulse Ox Range:  SpO2  Av.7 %  Min: 88 %  Max: 97 %  Oxygen Amount and Delivery: O2 Flow Rate (L/min): 4 L/min    Wt Readings from Last 3 Encounters:   22 129 lb 3 oz (58.6 kg)   19 162 lb 14.7 oz (73.9 kg)   18 150 lb (68 kg)       I/O (24 Hours)    Intake/Output Summary (Last 24 hours) at 2022 1051  Last data filed at 2022 0830  Gross per 24 hour   Intake 1483.84 ml   Output 150 ml   Net 1333.84 ml       EXAM     General Appearance  Awake, alert, oriented, in no acute distress  HEENT - normocephalic, atraumatic. Neck - Supple,  trachea midline   Lungs -coarse breath sounds no crackles rales or wheezes  Heart Exam:PMI normal. No lifts, heaves, or thrills. RRR. No murmurs, clicks, gallops, or rubs  Abdomen Exam: Abdomen soft, non-tender.  BS normal.  Extremity Exam: No edema    MEDS      [Held by provider] dilTIAZem  30 mg Oral 4 times per day    hydrALAZINE  10 mg IntraVENous Q6H    fat emulsion  100 mL IntraVENous Daily    insulin lispro  0-4 Units SubCUTAneous Q6H    ipratropium-albuterol  1 ampule Inhalation Q4H    [Held by provider] lisinopril  20 mg Oral Daily    doxycycline monohydrate  100 mg Oral 2 times per day    cefepime  2,000 mg IntraVENous Q8H    sodium chloride flush  5-40 mL IntraVENous 2 times per day    enoxaparin  40 mg SubCUTAneous Daily    [Held by provider] atorvastatin  40 mg Oral Daily    [Held by provider] montelukast  10 mg Oral Nightly    benzonatate  200 mg Oral TID    dextromethorphan  60 mg Oral 2 times per day    nicotine  1 patch TransDERmal Daily    methylPREDNISolone  40 mg IntraVENous Q6H      PN-Adult Premix 5/20 - Central 41.6 mL/hr at 11/12/22 0637    dextrose      niCARdipine      dexmedetomidine Stopped (11/12/22 0830)    sodium chloride       hydrALAZINE, metoprolol, sodium phosphate IVPB **OR** sodium phosphate IVPB **OR** sodium phosphate IVPB, glucose, dextrose bolus **OR** dextrose bolus, glucagon (rDNA), dextrose, perflutren lipid microspheres, midodrine, sodium chloride flush, sodium chloride flush, sodium chloride, ondansetron **OR** ondansetron, polyethylene glycol, acetaminophen **OR** acetaminophen    LABS   CBC   Recent Labs     11/12/22  0417   WBC 17.7*   HGB 15.3   HCT 47.4*   MCV 91.5        BMP:   Lab Results   Component Value Date/Time     11/12/2022 04:17 AM    K 3.9 11/12/2022 04:17 AM    CL 95 11/12/2022 04:17 AM    CO2 39 11/12/2022 04:17 AM    BUN 25 11/12/2022 04:17 AM    LABALBU 3.3 11/12/2022 04:17 AM    CREATININE 0.46 11/12/2022 04:17 AM    CALCIUM 9.0 11/12/2022 04:17 AM    GFRAA >60 11/01/2019 05:24 AM    LABGLOM >60 11/12/2022 04:17 AM     ABGs:  Lab Results   Component Value Date/Time    PHART 7.325 11/10/2022 05:52 PM    PO2ART 70.6 11/10/2022 05:52 PM    XIU7DIJ 81.2 11/10/2022 05:52 PM Lab Results   Component Value Date/Time    MODE BIPAP 11/10/2022 05:52 PM     Ionized Calcium:  No results found for: IONCA  Magnesium:    Lab Results   Component Value Date/Time    MG 2.5 11/12/2022 04:17 AM     Phosphorus:    Lab Results   Component Value Date/Time    PHOS 2.6 11/12/2022 04:17 AM        LIVER PROFILE   Recent Labs     11/12/22  0417   AST 13   ALT 11   BILITOT 0.3   ALKPHOS 49     INR No results for input(s): INR in the last 72 hours. PTT   Lab Results   Component Value Date    APTT 30.0 11/08/2022         RADIOLOGY     (See actual reports for details)    ASSESSMENT/PLAN     Patient Active Problem List   Diagnosis    Acute respiratory failure with hypoxia and hypercapnia (HCC)    Pneumonia    Acute exacerbation of chronic obstructive pulmonary disease (COPD) ([address]    Tobacco abuse    Chronic respiratory failure (HCC)    COPD exacerbation (HCC)    Hyponatremia    Influenza    Chronic obstructive pulmonary disease with hypoxia (HCC)    Acute respiratory failure (HCC)     Acute hypoxic respiratory failure  Multifocal pneumonia-most likely atypical/adenovirus  Acute exacerbation COPD  Poorly controlled hypertension  Hyponatremia-most likely SIADH from underlying pulmonary issues  Chronic hypercapnic respiratory failure  History of tobacco abuse  Medical noncompliance  Hypertension. Sputum culture, normal valdez. MRSA nasal probe negative. MRSA nasal probe negative  Viral test positive for adenovirus. A nicardipine drip ordered however if blood pressure medications p.o. can be started we may not need the Cardene drip. Antibiotics per ID  On Lovenox DVT prophylaxis.   Hold Solu-Medrol wean    Follow with other services    Electronically signed by Gian Collier MD on 11/12/2022 at 10:51 AM

## 2022-11-13 LAB
ANION GAP SERPL CALCULATED.3IONS-SCNC: 4 MMOL/L (ref 9–17)
BUN BLDV-MCNC: 20 MG/DL (ref 6–20)
CALCIUM SERPL-MCNC: 8.3 MG/DL (ref 8.6–10.4)
CHLORIDE BLD-SCNC: 93 MMOL/L (ref 98–107)
CO2: 35 MMOL/L (ref 20–31)
CREAT SERPL-MCNC: <0.4 MG/DL (ref 0.5–0.9)
GFR SERPL CREATININE-BSD FRML MDRD: ABNORMAL ML/MIN/1.73M2
GLUCOSE BLD-MCNC: 154 MG/DL (ref 65–105)
GLUCOSE BLD-MCNC: 156 MG/DL (ref 65–105)
GLUCOSE BLD-MCNC: 158 MG/DL (ref 65–105)
GLUCOSE BLD-MCNC: 230 MG/DL (ref 65–105)
GLUCOSE BLD-MCNC: 234 MG/DL (ref 70–99)
HCT VFR BLD CALC: 45.4 % (ref 36–46)
HEMOGLOBIN: 14.5 G/DL (ref 12–16)
MAGNESIUM: 2.1 MG/DL (ref 1.6–2.6)
MCH RBC QN AUTO: 29.4 PG (ref 26–34)
MCHC RBC AUTO-ENTMCNC: 31.8 G/DL (ref 31–37)
MCV RBC AUTO: 92.3 FL (ref 80–100)
PDW BLD-RTO: 14 % (ref 11.5–14.9)
PHOSPHORUS: 2.4 MG/DL (ref 2.6–4.5)
PLATELET # BLD: 320 K/UL (ref 150–450)
PMV BLD AUTO: 7.7 FL (ref 6–12)
POTASSIUM SERPL-SCNC: 3.4 MMOL/L (ref 3.7–5.3)
RBC # BLD: 4.92 M/UL (ref 4–5.2)
SODIUM BLD-SCNC: 132 MMOL/L (ref 135–144)
TRIGL SERPL-MCNC: 144 MG/DL
WBC # BLD: 17.6 K/UL (ref 3.5–11)

## 2022-11-13 PROCEDURE — 2500000003 HC RX 250 WO HCPCS

## 2022-11-13 PROCEDURE — 2580000003 HC RX 258: Performed by: INTERNAL MEDICINE

## 2022-11-13 PROCEDURE — 6360000002 HC RX W HCPCS: Performed by: NURSE PRACTITIONER

## 2022-11-13 PROCEDURE — 94640 AIRWAY INHALATION TREATMENT: CPT

## 2022-11-13 PROCEDURE — 82947 ASSAY GLUCOSE BLOOD QUANT: CPT

## 2022-11-13 PROCEDURE — 99232 SBSQ HOSP IP/OBS MODERATE 35: CPT | Performed by: INTERNAL MEDICINE

## 2022-11-13 PROCEDURE — 94660 CPAP INITIATION&MGMT: CPT

## 2022-11-13 PROCEDURE — 2500000003 HC RX 250 WO HCPCS: Performed by: INTERNAL MEDICINE

## 2022-11-13 PROCEDURE — 6360000002 HC RX W HCPCS: Performed by: INTERNAL MEDICINE

## 2022-11-13 PROCEDURE — 83735 ASSAY OF MAGNESIUM: CPT

## 2022-11-13 PROCEDURE — 6370000000 HC RX 637 (ALT 250 FOR IP): Performed by: INTERNAL MEDICINE

## 2022-11-13 PROCEDURE — 2580000003 HC RX 258

## 2022-11-13 PROCEDURE — 2000000000 HC ICU R&B

## 2022-11-13 PROCEDURE — 36415 COLL VENOUS BLD VENIPUNCTURE: CPT

## 2022-11-13 PROCEDURE — 6370000000 HC RX 637 (ALT 250 FOR IP)

## 2022-11-13 PROCEDURE — 94761 N-INVAS EAR/PLS OXIMETRY MLT: CPT

## 2022-11-13 PROCEDURE — 80048 BASIC METABOLIC PNL TOTAL CA: CPT

## 2022-11-13 PROCEDURE — 2700000000 HC OXYGEN THERAPY PER DAY

## 2022-11-13 PROCEDURE — 99233 SBSQ HOSP IP/OBS HIGH 50: CPT | Performed by: INTERNAL MEDICINE

## 2022-11-13 PROCEDURE — 84100 ASSAY OF PHOSPHORUS: CPT

## 2022-11-13 PROCEDURE — 84478 ASSAY OF TRIGLYCERIDES: CPT

## 2022-11-13 PROCEDURE — 6370000000 HC RX 637 (ALT 250 FOR IP): Performed by: STUDENT IN AN ORGANIZED HEALTH CARE EDUCATION/TRAINING PROGRAM

## 2022-11-13 PROCEDURE — 85027 COMPLETE CBC AUTOMATED: CPT

## 2022-11-13 RX ORDER — INSULIN LISPRO 100 [IU]/ML
0-4 INJECTION, SOLUTION INTRAVENOUS; SUBCUTANEOUS NIGHTLY
Status: DISCONTINUED | OUTPATIENT
Start: 2022-11-13 | End: 2022-11-17 | Stop reason: HOSPADM

## 2022-11-13 RX ORDER — POTASSIUM CHLORIDE 20 MEQ/1
40 TABLET, EXTENDED RELEASE ORAL ONCE
Status: COMPLETED | OUTPATIENT
Start: 2022-11-13 | End: 2022-11-13

## 2022-11-13 RX ORDER — INSULIN LISPRO 100 [IU]/ML
0-8 INJECTION, SOLUTION INTRAVENOUS; SUBCUTANEOUS
Status: DISCONTINUED | OUTPATIENT
Start: 2022-11-13 | End: 2022-11-17 | Stop reason: HOSPADM

## 2022-11-13 RX ORDER — HYDROCHLOROTHIAZIDE 25 MG/1
25 TABLET ORAL DAILY
Status: DISCONTINUED | OUTPATIENT
Start: 2022-11-13 | End: 2022-11-17 | Stop reason: HOSPADM

## 2022-11-13 RX ORDER — FLUTICASONE PROPIONATE 50 MCG
1 SPRAY, SUSPENSION (ML) NASAL DAILY
Status: DISCONTINUED | OUTPATIENT
Start: 2022-11-13 | End: 2022-11-17 | Stop reason: HOSPADM

## 2022-11-13 RX ORDER — POTASSIUM CHLORIDE 7.45 MG/ML
10 INJECTION INTRAVENOUS PRN
Status: DISCONTINUED | OUTPATIENT
Start: 2022-11-13 | End: 2022-11-17 | Stop reason: HOSPADM

## 2022-11-13 RX ORDER — POTASSIUM CHLORIDE 20 MEQ/1
40 TABLET, EXTENDED RELEASE ORAL PRN
Status: DISCONTINUED | OUTPATIENT
Start: 2022-11-13 | End: 2022-11-17 | Stop reason: HOSPADM

## 2022-11-13 RX ADMIN — LISINOPRIL 40 MG: 20 TABLET ORAL at 09:06

## 2022-11-13 RX ADMIN — ENOXAPARIN SODIUM 40 MG: 100 INJECTION SUBCUTANEOUS at 09:11

## 2022-11-13 RX ADMIN — HYDROCHLOROTHIAZIDE 25 MG: 25 TABLET ORAL at 09:06

## 2022-11-13 RX ADMIN — SODIUM PHOSPHATE, MONOBASIC, MONOHYDRATE 10 MMOL: 276; 142 INJECTION, SOLUTION INTRAVENOUS at 07:50

## 2022-11-13 RX ADMIN — CEFEPIME 2000 MG: 2 INJECTION, POWDER, FOR SOLUTION INTRAVENOUS at 06:29

## 2022-11-13 RX ADMIN — DILTIAZEM HYDROCHLORIDE 30 MG: 30 TABLET, FILM COATED ORAL at 11:38

## 2022-11-13 RX ADMIN — HYDRALAZINE HYDROCHLORIDE 10 MG: 20 INJECTION INTRAMUSCULAR; INTRAVENOUS at 01:20

## 2022-11-13 RX ADMIN — Medication 60 MG: at 20:47

## 2022-11-13 RX ADMIN — MONTELUKAST 10 MG: 10 TABLET, FILM COATED ORAL at 20:47

## 2022-11-13 RX ADMIN — Medication 60 MG: at 09:06

## 2022-11-13 RX ADMIN — SODIUM CHLORIDE, PRESERVATIVE FREE 10 ML: 5 INJECTION INTRAVENOUS at 20:47

## 2022-11-13 RX ADMIN — IPRATROPIUM BROMIDE AND ALBUTEROL SULFATE 1 AMPULE: 2.5; .5 SOLUTION RESPIRATORY (INHALATION) at 20:02

## 2022-11-13 RX ADMIN — DILTIAZEM HYDROCHLORIDE 30 MG: 30 TABLET, FILM COATED ORAL at 06:33

## 2022-11-13 RX ADMIN — FLUTICASONE PROPIONATE 1 SPRAY: 50 SPRAY, METERED NASAL at 15:30

## 2022-11-13 RX ADMIN — IPRATROPIUM BROMIDE AND ALBUTEROL SULFATE 1 AMPULE: 2.5; .5 SOLUTION RESPIRATORY (INHALATION) at 15:33

## 2022-11-13 RX ADMIN — IPRATROPIUM BROMIDE AND ALBUTEROL SULFATE 1 AMPULE: 2.5; .5 SOLUTION RESPIRATORY (INHALATION) at 00:16

## 2022-11-13 RX ADMIN — POTASSIUM CHLORIDE 40 MEQ: 1500 TABLET, EXTENDED RELEASE ORAL at 11:05

## 2022-11-13 RX ADMIN — INSULIN LISPRO 1 UNITS: 100 INJECTION, SOLUTION INTRAVENOUS; SUBCUTANEOUS at 06:30

## 2022-11-13 RX ADMIN — ATORVASTATIN CALCIUM 40 MG: 40 TABLET, FILM COATED ORAL at 09:06

## 2022-11-13 RX ADMIN — BENZONATATE 200 MG: 200 CAPSULE ORAL at 09:06

## 2022-11-13 RX ADMIN — IPRATROPIUM BROMIDE AND ALBUTEROL SULFATE 1 AMPULE: 2.5; .5 SOLUTION RESPIRATORY (INHALATION) at 10:52

## 2022-11-13 RX ADMIN — IPRATROPIUM BROMIDE AND ALBUTEROL SULFATE 1 AMPULE: 2.5; .5 SOLUTION RESPIRATORY (INHALATION) at 07:22

## 2022-11-13 RX ADMIN — METHYLPREDNISOLONE SODIUM SUCCINATE 40 MG: 40 INJECTION, POWDER, FOR SOLUTION INTRAMUSCULAR; INTRAVENOUS at 20:46

## 2022-11-13 RX ADMIN — METHYLPREDNISOLONE SODIUM SUCCINATE 40 MG: 40 INJECTION, POWDER, FOR SOLUTION INTRAMUSCULAR; INTRAVENOUS at 13:45

## 2022-11-13 RX ADMIN — DOXYCYCLINE 100 MG: 100 CAPSULE ORAL at 09:06

## 2022-11-13 RX ADMIN — BENZONATATE 200 MG: 200 CAPSULE ORAL at 20:47

## 2022-11-13 RX ADMIN — IPRATROPIUM BROMIDE AND ALBUTEROL SULFATE 1 AMPULE: 2.5; .5 SOLUTION RESPIRATORY (INHALATION) at 23:36

## 2022-11-13 RX ADMIN — DOXYCYCLINE 100 MG: 100 CAPSULE ORAL at 20:47

## 2022-11-13 RX ADMIN — DEXMEDETOMIDINE HYDROCHLORIDE 0.6 MCG/KG/HR: 400 INJECTION INTRAVENOUS at 05:11

## 2022-11-13 RX ADMIN — METHYLPREDNISOLONE SODIUM SUCCINATE 40 MG: 40 INJECTION, POWDER, FOR SOLUTION INTRAMUSCULAR; INTRAVENOUS at 06:32

## 2022-11-13 RX ADMIN — DILTIAZEM HYDROCHLORIDE 30 MG: 30 TABLET, FILM COATED ORAL at 23:42

## 2022-11-13 RX ADMIN — DILTIAZEM HYDROCHLORIDE 30 MG: 30 TABLET, FILM COATED ORAL at 18:07

## 2022-11-13 RX ADMIN — CEFEPIME 2000 MG: 2 INJECTION, POWDER, FOR SOLUTION INTRAVENOUS at 22:32

## 2022-11-13 RX ADMIN — METOPROLOL TARTRATE 5 MG: 5 INJECTION, SOLUTION INTRAVENOUS at 23:42

## 2022-11-13 RX ADMIN — INSULIN LISPRO 2 UNITS: 100 INJECTION, SOLUTION INTRAVENOUS; SUBCUTANEOUS at 09:11

## 2022-11-13 RX ADMIN — BENZONATATE 200 MG: 200 CAPSULE ORAL at 13:44

## 2022-11-13 RX ADMIN — IPRATROPIUM BROMIDE AND ALBUTEROL SULFATE 1 AMPULE: 2.5; .5 SOLUTION RESPIRATORY (INHALATION) at 03:21

## 2022-11-13 RX ADMIN — METHYLPREDNISOLONE SODIUM SUCCINATE 40 MG: 40 INJECTION, POWDER, FOR SOLUTION INTRAMUSCULAR; INTRAVENOUS at 01:14

## 2022-11-13 RX ADMIN — CEFEPIME 2000 MG: 2 INJECTION, POWDER, FOR SOLUTION INTRAVENOUS at 13:44

## 2022-11-13 ASSESSMENT — ENCOUNTER SYMPTOMS
COUGH: 1
ABDOMINAL DISTENTION: 0
SHORTNESS OF BREATH: 1
NAUSEA: 0
DIARRHEA: 0
RHINORRHEA: 0
CONSTIPATION: 0
VOMITING: 0
WHEEZING: 1

## 2022-11-13 ASSESSMENT — PAIN SCALES - GENERAL
PAINLEVEL_OUTOF10: 0

## 2022-11-13 NOTE — PLAN OF CARE
Problem: Discharge Planning  Goal: Discharge to home or other facility with appropriate resources  Outcome: Progressing  Flowsheets (Taken 11/12/2022 1600)  Discharge to home or other facility with appropriate resources: Identify barriers to discharge with patient and caregiver     Problem: Safety - Adult  Goal: Free from fall injury  Outcome: Progressing  Flowsheets (Taken 11/12/2022 0700 by Yg Kaminski RN)  Free From Fall Injury: Instruct family/caregiver on patient safety     Problem: Pain  Goal: Verbalizes/displays adequate comfort level or baseline comfort level  Outcome: Progressing     Problem: Respiratory - Adult  Goal: Achieves optimal ventilation and oxygenation  Outcome: Progressing  Flowsheets  Taken 11/12/2022 1600 by Morgan Mandujano RN  Achieves optimal ventilation and oxygenation:   Assess for changes in respiratory status   Assess for changes in mentation and behavior   Position to facilitate oxygenation and minimize respiratory effort   Oxygen supplementation based on oxygen saturation or arterial blood gases   Initiate smoking cessation protocol as indicated   Assess the need for suctioning and aspirate as needed   Assess and instruct to report shortness of breath or any respiratory difficulty   Respiratory therapy support as indicated  Taken 11/12/2022 0700 by Yg Kaminski RN  Achieves optimal ventilation and oxygenation:   Assess for changes in respiratory status   Assess for changes in mentation and behavior   Position to facilitate oxygenation and minimize respiratory effort   Oxygen supplementation based on oxygen saturation or arterial blood gases   Initiate smoking cessation protocol as indicated   Respiratory therapy support as indicated   Assess and instruct to report shortness of breath or any respiratory difficulty     Problem: Cardiovascular - Adult  Goal: Maintains optimal cardiac output and hemodynamic stability  Outcome: Progressing  Flowsheets  Taken 11/12/2022 1600 by Britt Mcnair RN  Maintains optimal cardiac output and hemodynamic stability:   Monitor blood pressure and heart rate   Monitor urine output and notify Licensed Independent Practitioner for values outside of normal range   Assess for signs of decreased cardiac output   Administer fluid and/or volume expanders as ordered  Taken 11/12/2022 0700 by Duran Ahumada RN  Maintains optimal cardiac output and hemodynamic stability:   Monitor blood pressure and heart rate   Monitor urine output and notify Licensed Independent Practitioner for values outside of normal range   Assess for signs of decreased cardiac output   Administer fluid and/or volume expanders as ordered  Goal: Absence of cardiac dysrhythmias or at baseline  Outcome: Progressing  Flowsheets  Taken 11/12/2022 1600 by Britt Mcnair RN  Absence of cardiac dysrhythmias or at baseline:   Monitor cardiac rate and rhythm   Assess for signs of decreased cardiac output   Administer antiarrhythmia medication and electrolyte replacement as ordered  Taken 11/12/2022 0700 by Duran Ahumada RN  Absence of cardiac dysrhythmias or at baseline:   Monitor cardiac rate and rhythm   Assess for signs of decreased cardiac output     Problem: Skin/Tissue Integrity - Adult  Goal: Skin integrity remains intact  Outcome: Progressing  Flowsheets  Taken 11/12/2022 1600 by Britt Mcnair RN  Skin Integrity Remains Intact:   Monitor for areas of redness and/or skin breakdown   Assess vascular access sites hourly   Every 4-6 hours minimum: Change oxygen saturation probe site   Every 4-6 hours: If on nasal continuous positive airway pressure, respiratory therapy assesses nares and determine need for appliance change or resting period  Taken 11/12/2022 0700 by Duran Ahumada RN  Skin Integrity Remains Intact: Monitor for areas of redness and/or skin breakdown  Goal: Oral mucous membranes remain intact  Outcome: Progressing  Flowsheets (Taken 11/12/2022 0700 by Stephan Waterman RN)  Oral Mucous Membranes Remain Intact:   Assess oral mucosa and hygiene practices   Implement preventative oral hygiene regimen     Problem: Death & Dying  Goal: Pt/Family communicate acceptance of impending death and feel psychological comfort and peace  Description: INTERVENTIONS:  1. Assess patient/family anxiety and grief process related to end of life issues  2. Provide emotional and spiritual support  3. Provide information about the patient's health status with consideration of family and cultural values  4. Communicate willingness to discuss death and facilitate grief process  with patient/family as appropriate  5. Emphasize sustaining relationships within family system and community, or amy/spiritual traditions  6. Initiate Spiritual Care, Psychosocial Clinical Specialist, consult as needed  Outcome: Progressing  Flowsheets  Taken 11/12/2022 1600 by Lorene Lauren RN  Patient/family communicates acceptance of loss or impending death and feels physical/psychological comfort and peace: Provide emotional and spiritual support  Taken 11/12/2022 0700 by Stephan Waterman RN  Patient/family communicates acceptance of loss or impending death and feels physical/psychological comfort and peace: Provide emotional and spiritual support     Problem: Decision Making  Goal: Pt/Family able to effectively weigh alternatives and participate in decision making related to treatment and care  Description: INTERVENTIONS:  1. Determine when there are differences between patient's view, family's view, and healthcare provider's view of condition  2. Facilitate patient and family articulation of goals for care  3. Help patient and family identify pros/cons of alternative solutions  4. Provide information as requested by patient/family  5. Respect patient/family right to receive or not to receive information  6. Serve as a liaison between patient and family and health care team  7.  Initiate Consults from Ethics, Palliative Care or initiate [facility] as is appropriate  Outcome: Progressing  Flowsheets (Taken 11/12/2022 1600)  Patient/family able to effectively weigh alternatives and participate in decision making related to treatment and care: Determine when there are differences between patient's view, family's view, and healthcare provider's view of condition     Problem: Skin/Tissue Integrity  Goal: Absence of new skin breakdown  Description: 1. Monitor for areas of redness and/or skin breakdown  2. Assess vascular access sites hourly  3. Every 4-6 hours minimum:  Change oxygen saturation probe site  4. Every 4-6 hours:  If on nasal continuous positive airway pressure, respiratory therapy assess nares and determine need for appliance change or resting period.   Outcome: Progressing     Problem: ABCDS Injury Assessment  Goal: Absence of physical injury  Outcome: Progressing  Flowsheets (Taken 11/12/2022 0700 by Chary Jean RN)  Absence of Physical Injury: Implement safety measures based on patient assessment     Problem: Nutrition Deficit:  Goal: Optimize nutritional status  Outcome: Progressing  Flowsheets (Taken 11/12/2022 1355 by Harrison Rivers, RD, LD)  Nutrient intake appropriate for improving, restoring, or maintaining nutritional needs:   Recommend, monitor, and adjust tube feedings and TPN/PPN based on assessed needs   Monitor oral intake, labs, and treatment plans

## 2022-11-13 NOTE — PLAN OF CARE
Problem: Discharge Planning  Goal: Discharge to home or other facility with appropriate resources  11/13/2022 1653 by Dyana Meza RN  Outcome: Progressing  Flowsheets  Taken 11/13/2022 1615  Discharge to home or other facility with appropriate resources: Refer to discharge planning if patient needs post-hospital services based on physician order or complex needs related to functional status, cognitive ability or social support system  Taken 11/13/2022 1200  Discharge to home or other facility with appropriate resources: Refer to discharge planning if patient needs post-hospital services based on physician order or complex needs related to functional status, cognitive ability or social support system  Taken 11/13/2022 0900  Discharge to home or other facility with appropriate resources: Refer to discharge planning if patient needs post-hospital services based on physician order or complex needs related to functional status, cognitive ability or social support system     Problem: Pain  Goal: Verbalizes/displays adequate comfort level or baseline comfort level  11/13/2022 1653 by Dyana Meza RN  Outcome: Progressing  Flowsheets  Taken 11/13/2022 1600  Verbalizes/displays adequate comfort level or baseline comfort level:   Encourage patient to monitor pain and request assistance   Assess pain using appropriate pain scale   Administer analgesics based on type and severity of pain and evaluate response   Implement non-pharmacological measures as appropriate and evaluate response  Taken 11/13/2022 1130  Verbalizes/displays adequate comfort level or baseline comfort level:   Encourage patient to monitor pain and request assistance   Assess pain using appropriate pain scale   Administer analgesics based on type and severity of pain and evaluate response     Problem: Respiratory - Adult  Goal: Achieves optimal ventilation and oxygenation  11/13/2022 1653 by Dyana Meza RN  Outcome: Progressing  Flowsheets  Taken 11/13/2022 1200  Achieves optimal ventilation and oxygenation:   Assess for changes in respiratory status   Assess for changes in mentation and behavior   Position to facilitate oxygenation and minimize respiratory effort   Oxygen supplementation based on oxygen saturation or arterial blood gases  Taken 11/13/2022 0900  Achieves optimal ventilation and oxygenation:   Assess for changes in respiratory status   Oxygen supplementation based on oxygen saturation or arterial blood gases   Position to facilitate oxygenation and minimize respiratory effort   Assess for changes in mentation and behavior

## 2022-11-13 NOTE — PROGRESS NOTES
[address]    PROGRESS NOTE             11/13/2022    10:09 AM    Name:   Juvenal Sun  MRN:     [id number]     Acct:      [de-identified]   Room:   2006/2006-01  IP Day:  5  Admit Date:  11/8/2022 11:16 AM    PCP:  Noris Klein DTR  Code Status:  Full Code    Subjective:     C/C:   Chief Complaint   Patient presents with    Dizziness    Cough    Shortness of Breath     Interval History Status: improved. The patient was seen and examined at the bedside. Overnight event: patient became agitated and refused to wear BiPAP, Precedex was increased. Patient not currently on Precedex. Currently saturating 91% on 4 L. BP well controlled on po Cardizem and Lisinopril . /65     Cardene drip was not started. TPN taper once patient starts adequate food intake. She denies headache, dizziness, chest pain, shortness of breath, abdominal pain, nausea, and vomiting. Brief History:      The patient is a 61year old non- female with a history of COPD (not on home oxygen), smoking (60 pack-years), hypertension, and hyperlipidemia who presents with shortness of breath worsening over the past week. The patient endorses a cough productive of green phlegm that has also worsened over the past week. She denies headache, dizziness, syncope, fever, chills, abdominal pain, nausea, vomiting, constipation and diarrhea. In the ED, the patient was hypoxic  (Sp02 58% on room air), tachycardic (108), and tachypneic (34). ABGs showed hypercapnia (pC02 58.3) and hypoxia (pO2 63.4). The patient was placed on BiPAP, but did not tolerate it and was placed on high flow nasal cannula. The patient was on 30 L high flow with 50% Fi02 and saturating 92%. The patient was given one dose of Solumedrol 125 mg and one dose of Duoneb nebulizer. The patient was given a bolus of IV normal saline. Respiratory panel was positive for adenovirus.  IV ceftriaxone was started. Glucose was 158. Creatinine was 0.71. WBCs were elevated at 18.4. Troponin was elevated at 56; repeat 50. EKG showed sinus tachycardia with occasional PVCs, possible LA enlargement, right superior axis deviation, and incomplete right bundle branch block, RV hypertrophy, T wave abnormality (possible inferior and anterolateral ischemia). CT chest was negative for pulmonary embolism, but showed multifocal tree-in-bud and nodular opacities throughout both lungs likely representing recurrent aspiration or atypical mycobacterium infection; mild emphysema, bronchiectasis and scarring along medial right upper lobe. The patient was admitted for management of acute on chronic hypoxic, hypercapnic respiratory failure secondary to COPD exacerbation and multifocal pneumonia. Pulmonology and infectious disease were consulted     Review of Systems:     Review of Systems   Constitutional:  Negative for activity change, chills, fatigue and fever. HENT:  Negative for congestion and rhinorrhea. Respiratory:  Positive for cough, shortness of breath and wheezing. Cardiovascular:  Negative for chest pain, palpitations and leg swelling. Gastrointestinal:  Negative for abdominal distention, constipation, diarrhea, nausea and vomiting. Endocrine: Negative for cold intolerance and heat intolerance. Genitourinary:  Negative for dysuria, frequency and urgency. Musculoskeletal:  Negative for arthralgias and myalgias. Skin:  Negative for pallor and rash. Neurological:  Negative for dizziness, tremors, syncope, weakness, light-headedness and headaches. Psychiatric/Behavioral:  Negative for agitation and confusion. Medications:      Allergies:  No Known Allergies    Current Meds:   Scheduled Meds:    hydroCHLOROthiazide  25 mg Oral Daily    insulin lispro  0-8 Units SubCUTAneous TID WC    insulin lispro  0-4 Units SubCUTAneous Nightly    potassium chloride  40 mEq Oral Once    lisinopril  40 mg Oral Daily    dilTIAZem  30 mg Oral 4 times per day    [Held by provider] hydrALAZINE  10 mg IntraVENous Q6H    fat emulsion  100 mL IntraVENous Daily    ipratropium-albuterol  1 ampule Inhalation Q4H    doxycycline monohydrate  100 mg Oral 2 times per day    cefepime  2,000 mg IntraVENous Q8H    sodium chloride flush  5-40 mL IntraVENous 2 times per day    enoxaparin  40 mg SubCUTAneous Daily    atorvastatin  40 mg Oral Daily    montelukast  10 mg Oral Nightly    benzonatate  200 mg Oral TID    dextromethorphan  60 mg Oral 2 times per day    nicotine  1 patch TransDERmal Daily    methylPREDNISolone  40 mg IntraVENous Q6H     Continuous Infusions:    PN-Adult Premix 5/20 - Central 41.6 mL/hr at 11/12/22 1840    dextrose      niCARdipine      dexmedetomidine Stopped (11/13/22 0740)    sodium chloride       PRN Meds: potassium chloride **OR** potassium alternative oral replacement **OR** potassium chloride, hydrALAZINE, metoprolol, sodium phosphate IVPB **OR** sodium phosphate IVPB **OR** sodium phosphate IVPB, glucose, dextrose bolus **OR** dextrose bolus, glucagon (rDNA), dextrose, perflutren lipid microspheres, sodium chloride flush, sodium chloride flush, sodium chloride, ondansetron **OR** ondansetron, polyethylene glycol, acetaminophen **OR** acetaminophen    Data:     Past Medical History:   has a past medical history of Acute respiratory failure ([facility] COPD (chronic obstructive pulmonary disease) ([facility] Cough, Current every day smoker, Dyspnea, Heart murmur, Hypertension, and Shortness of breath. Social History:   reports that she has been smoking cigarettes. She has been smoking an average of .5 packs per day. She has never used smokeless tobacco. She reports that she does not drink alcohol and does not use drugs. Family History: History reviewed. No pertinent family history.     Vitals:  BP (!) 145/65   Pulse 67   Temp 97.4 °F (36.3 °C) (Oral)   Resp 21   Ht 4' 11\" (1.499 m)   Wt 129 lb 3 oz (58.6 kg)   SpO2 92%   BMI 26.09 kg/m²   Temp (24hrs), Av.1 °F (36.7 °C), Min:97.4 °F (36.3 °C), Max:98.5 °F (36.9 °C)    Recent Labs     22  1153 22  1724 22  2353 22  0804   POCGLU 234* 135* 246* 230*       I/O(24Hr): Intake/Output Summary (Last 24 hours) at 2022 1009  Last data filed at 2022 1900  Gross per 24 hour   Intake --   Output 150 ml   Net -150 ml       Labs:  [unfilled]    Lab Results   Component Value Date/Time    SPECIAL NOT REPORTED 10/28/2019 07:58 PM     Lab Results   Component Value Date/Time    CULTURE NORMAL RESPIRATORY DAVID MODERATE GROWTH 2022 10:25 PM       [unfilled]    Radiology:    XR CHEST PORTABLE    Result Date: 2022  EXAMINATION: ONE XRAY VIEW OF THE CHEST 2022 6:24 am COMPARISON: Chest radiograph performed 11/10/2022. HISTORY: ORDERING SYSTEM PROVIDED HISTORY: Adenovirus pneumonia, severe COPD TECHNOLOGIST PROVIDED HISTORY: Adenovirus pneumonia, severe COPD Reason for Exam: adenovirus pneumonia, severe COPD FINDINGS: There is no acute consolidation or effusion. There is no pneumothorax. The mediastinal structures are unremarkable. The upper abdomen is unremarkable. The extrathoracic soft tissues are unremarkable. There is a right-sided PICC line with the tip in the mid SVC. No acute cardiopulmonary process. Right-sided PICC line with the tip in the mid SVC. XR CHEST PORTABLE    Result Date: 11/10/2022  EXAMINATION: ONE XRAY VIEW OF THE CHEST 11/10/2022 7:46 am COMPARISON: 2022, 10/29/2019 HISTORY: ORDERING SYSTEM PROVIDED HISTORY: SOB TECHNOLOGIST PROVIDED HISTORY: SOB Reason for Exam: dyspnea FINDINGS: Interstitial opacities with mild septal thickening has increased in the interval.  Perihilar opacities are noted as well. No pneumothorax or significant effusion appreciated. Cardiac and mediastinal contours appear unchanged.      Increasing interstitial opacities, which may represent developing edema versus inflammatory process or atypical infection. XR CHEST PORTABLE    Result Date: 11/8/2022  EXAMINATION: ONE XRAY VIEW OF THE CHEST 11/8/2022 8:37 am COMPARISON: 10/19/2019 HISTORY: ORDERING SYSTEM PROVIDED HISTORY: sob TECHNOLOGIST PROVIDED HISTORY: sob Reason for Exam: sob FINDINGS: Cardial pericardial silhouette is prominent but stable. Increased interstitial opacities noted bilaterally. Focal infiltrate is not identified. No pneumothorax. No free air. No acute bony abnormality. Increased interstitial opacity. While much or all of this may be chronic, please correlate with any clinical evidence of acute interstitial edema. CT CHEST PULMONARY EMBOLISM W CONTRAST    Result Date: 11/8/2022  EXAMINATION: CTA OF THE CHEST 11/8/2022 12:44 pm TECHNIQUE: CTA of the chest was performed after the administration of intravenous contrast.  Multiplanar reformatted images are provided for review. MIP images are provided for review. Automated exposure control, iterative reconstruction, and/or weight based adjustment of the mA/kV was utilized to reduce the radiation dose to as low as reasonably achievable. COMPARISON: 10/31/2019 HISTORY: ORDERING SYSTEM PROVIDED HISTORY: sob tachycardia TECHNOLOGIST PROVIDED HISTORY: odette tachycardia Decision Support Exception - unselect if not a suspected or confirmed emergency medical condition->Emergency Medical Condition (MA) Reason for Exam: sob, COPD 18-year-old female with shortness of breath, COPD, tachycardia FINDINGS: Pulmonary Arteries: No obvious filling defect in the pulmonary arterial vasculature that meets the criteria for pulmonary embolus within the limitations of this study. Evaluation of the pulmonary arterial vasculature is limited due to streak artifact from the contrast bolus in the SVC, suboptimal bolus timing, and respiratory motion. Mediastinum: Visualized thyroid gland grossly unremarkable in appearance.  Atheromatous plaque and atherosclerotic calcification of the aorta. Coronary artery disease. Bilateral hilar lymph node enlargement. No axillary lymphadenopathy. No mediastinal lymphadenopathy. No dissection flap within the visualized thoracic aorta. No pericardial or pleural effusions. No periaortic or mediastinal hemorrhage. Lungs/pleura: Trachea and proximal central airways appear patent. Respiratory motion throughout both lungs. Mild emphysema. Bronchiectasis and scarring along the medial right upper lobe. Tree-in-bud and nodular opacities throughout the bilateral upper lobes. Similar findings are seen within the right middle lobe, lingula, and superior segments of the lower lobes as well as the anteroinferior lower lobes. Upper Abdomen: Fatty liver. Atheromatous plaque and atherosclerotic calcification of the upper abdominal aorta and branch vasculature. Evaluation of the upper abdomen is limited due to respiratory motion. Soft Tissues/Bones: Mild diffuse degenerative changes throughout the spine. 1. No clear evidence for central pulmonary embolus within the limitations of this study. 2. Multifocal tree-in-bud and nodular opacities throughout both lungs as detailed above likely representing sequela of recurrent aspiration or atypical mycobacterial infection. Follow-up is recommended to document resolution. 3. Mild emphysema. Respiratory motion. Bronchiectasis and scarring along the medial right upper lobe. 4. Atheromatous plaque and atherosclerotic calcification of the aorta. Coronary artery disease. 5. Bilateral hilar lymph node enlargement, likely reactive. 6. Fatty liver.      VL Upper Extremity Venous Duplex Left    Result Date: 11/11/2022    [address] [place] [address]  Vascular Upper Extremities Veins Procedure   Patient Name   José Miguel DAUGHERTY  Date of Study           11/10/2022   Date of Birth  1965  Gender                  Female   Age            61 year(s)  Race                       Room Number    2006 Height:                 59 inch, 149.86 cm   Corporate ID # [id number]    Weight:                 128 pounds, 58.1 kg   Patient Acct # [de-identified]   BSA:        1.53 m^2    BMI:       25.85 kg/m^2   MR #           [id number]      Sonographer             Christophebenedicto Cassidy RVT   Accession #    [id number]  Interpreting Physician  [address] [place] [address]   Referring                  Referring Physician     Jovany Pederson  Nurse  Practitioner  Procedure Type of Study:   Veins: Upper Extremities Veins, Venous Scan Upper Left. Indications for Study:R/O DVT. Patient Status: In Patient. Technical Quality:Adequate visualization. Conclusions   Summary   No evidence of superficial or deep venous thrombosis in the left upper  extremity. Signature   ----------------------------------------------------------------  Electronically signed by Yanira Cassidy RVT(Sonographer) on  11/10/2022 12:49 PM  ----------------------------------------------------------------   ----------------------------------------------------------------  Electronically signed by Jeanine Denier Reyes,Arthur(Interpreting  physician) on 11/11/2022 02:42 PM  ----------------------------------------------------------------  Findings:   Right Impression:          Left Impression:  Right subclavian vein is   Left internal jugular, subclavian, axillary,  compressible with normal   brachial, ulnar, radial, cephalic and basilic  doppler responses. veins are compressible with normal doppler                             responses. Velocities are measured in cm/s ; Diameters are measured in cm Right UE Vein Measurements 2D Measurements +---------------+-----------------+----------------------+-----------------+ ! Location       ! Visualized       ! Compressibility       ! Thrombosis       ! +---------------+-----------------+----------------------+-----------------+ ! Dist SCV       ! Yes              ! Yes                   ! None             ! +---------------+-----------------+----------------------+-----------------+ Left UE Vein Measurements 2D Measurements +------------------------------------+----------+---------------+----------+ ! Location                            ! Visualized! Compressibility! Thrombosis! +------------------------------------+----------+---------------+----------+ ! Prox IJV                            ! Yes       ! Yes            ! None      ! +------------------------------------+----------+---------------+----------+ ! Dist IJV                            ! Yes       ! Yes            ! None      ! +------------------------------------+----------+---------------+----------+ ! Prox SCV                            ! Yes       ! Yes            ! None      ! +------------------------------------+----------+---------------+----------+ ! Dist SCV                            ! Yes       ! Yes            ! None      ! +------------------------------------+----------+---------------+----------+ ! Prox Axillary                       ! Yes       ! Yes            ! None      ! +------------------------------------+----------+---------------+----------+ ! Dist Axillary                       ! Yes       ! Yes            ! None      ! +------------------------------------+----------+---------------+----------+ ! Prox Brachial                       !Yes       ! Yes            ! None      ! +------------------------------------+----------+---------------+----------+ ! Dist Brachial                       !Yes       ! Yes            ! None      ! +------------------------------------+----------+---------------+----------+ ! Prox Radial                         !Yes       ! Yes            ! None      ! +------------------------------------+----------+---------------+----------+ ! Dist Radial                         !Yes       ! Yes            ! None      ! +------------------------------------+----------+---------------+----------+ ! Prox Ulnar                          ! Yes       ! Yes !None      ! +------------------------------------+----------+---------------+----------+ ! Dist Ulnar                          ! Yes       ! Yes            ! None      ! +------------------------------------+----------+---------------+----------+ ! Basilic at UA                       ! Yes       ! Yes            ! None      ! +------------------------------------+----------+---------------+----------+ ! Basilic at AF                       ! Yes       ! Yes            ! None      ! +------------------------------------+----------+---------------+----------+ ! Basilic at [address]                       ! Yes       ! Yes            ! None      ! +------------------------------------+----------+---------------+----------+ ! Cephalic at UA                      ! Yes       ! Yes            ! None      ! +------------------------------------+----------+---------------+----------+ ! Cephalic at AF                      ! Yes       ! Yes            ! None      ! +------------------------------------+----------+---------------+----------+ ! Cephalic at [address]                      ! Yes       ! Yes            ! None      ! +------------------------------------+----------+---------------+----------+        Physical Examination:        Physical Exam  Constitutional:       General: She is not in acute distress. Appearance: Normal appearance. HENT:      Head: Normocephalic and atraumatic. Nose: No congestion or rhinorrhea. Eyes:      Extraocular Movements: Extraocular movements intact. Conjunctiva/sclera: Conjunctivae normal.      Pupils: Pupils are equal, round, and reactive to light. Cardiovascular:      Rate and Rhythm: Normal rate and regular rhythm. Pulses: Normal pulses. Heart sounds: Normal heart sounds. No murmur heard. No friction rub. No gallop. Pulmonary:      Effort: Pulmonary effort is normal.      Breath sounds: Wheezing and rhonchi present. No rales. Abdominal:      General: Abdomen is flat.  Bowel sounds are normal. There is no distension. Tenderness: There is no abdominal tenderness. There is no guarding. Musculoskeletal:      Right lower leg: No edema. Left lower leg: No edema. Skin:     Capillary Refill: Capillary refill takes less than 2 seconds. Coloration: Skin is not jaundiced or pale. Neurological:      General: No focal deficit present. Mental Status: She is alert and oriented to person, place, and time. Sensory: No sensory deficit. Motor: No weakness. Psychiatric:         Mood and Affect: Mood normal.         Assessment:        Primary Problem  Acute respiratory failure [facility])    Active Hospital Problems    Diagnosis Date Noted    [facility] [J96.00] 11/08/2022     Priority: Medium       Plan:         Acute on chronic hypoxic, hypercapnic respiratory failure 2/2 COPD exacerbation and multifocal pneumonia r/o TB  -ABGs 11/10: pH 7.325, pC02 81.2, p02 70.6; HC03 42.3  -Patient saturating 91% on 4 L. The patient will receive BiPap when sleeping  -CXR: increased interstitial opacity (edema vs. Atypical resp. Infection)  -Repeat CXR 11/12: no acute cardiopulmonary process  -CT PE: multifocal tree-in-bud and nodular opacities throughout both lungs likely representing recurrent aspiration or atypical mycobacterium infection; mild emphysema, bronchiectasis and scarring along medial right upper lobe; Lt. subclavian artery stenosis vs. occlusion  -WBCs on admission 18.4; today:17.6  -Respiratory viral panel: positive for adenovirus  -Respiratory culture normal valdez  -Legionella negative, S.  Pneumoniae negative, Mycoplasma negative  -QuantiFERON gold test: negative   -Pro-BNP: 1,874  -Echo showed EF of about 55%  -Pro-calcitonin 0.10  -Lactic acid 0.8  -Continue Cefepime 2000 mg IV q 8 for 7 days (last dose 11/17)  -Continue Doxycycline 100 mg po q 12 hours for 7 days (last dose 11/17)  -Continue DuoNeb aerols  -Continue anti-tussives: Benzozonate, Delsym -Continue Singulair 10 mg po nightly   -QuantiFERON-TB test negative  -Pulmonology consulted (hold Solu-medrol wean; continue to monitor BP)  -ID consulted (continue Cefepime and Doxycycline)        Hypokalemia   -K+ today 3.4  -Mg 2.1  -Start 40 mEq potassium chloride po once   -K+ replacement protocol in place   -Continue to monitor      Hyperlipidemia  -Continue Lipitor 40 mg po daily      Hypertension  -Hypertensive urgency yesterday (systolic > 583); now resolved   -Continue Hydralazine 10 mg IV q 6 hours PRN  -Continue Lisinopril 40 mg po daily (home med)  -Continue Diltiazem 30 mg po q 6 hours   -Start HCTZ 25 mg po daily   -Continue Lopressor 5 mg IV q 6 hours PRN   -Cardene drip not started yesterday     Contact and droplet isolation   DVT prophylaxis: Lovenox 40 mg SC daily   Code: Full code  Diet: Regular adult diet ; TPN not given today   PT/OT/SW consulted        Cristiana Hussein MD  11/13/2022  10:09 AM      Attending Physician Statement  I have discussed the care of Marion Goode and I have examined the patient myselft and taken ros and hpi , including pertinent history and exam findings,  with the resident. I have reviewed the key elements of all parts of the encounter with the resident. I agree with the assessment, plan and orders as documented by the resident.   Acute on chronic respiratory failure but very anxious with the BiPAP use needed Precedex drip kept in ICU for the same hypertension improved did not require Cardene drip  Overall prognosis is poor with active smoking severe COPD and intolerant to BiPAP prolonged hospital stay and readmission is expected with above situation    Electronically signed by Blayne Cee MD

## 2022-11-13 NOTE — PROGRESS NOTES
Comprehensive Nutrition Assessment    Type and Reason for Visit:  Reassess    Nutrition Recommendations/Plan:   Continue current diet. Continue oral nutrition supplements. If TPN to continue, adjust additives as follows: NaCl: 35 to 60 mEq, K Phosphate: 0 to 20 mMol, Mg Sulfate: 0 to 2 mEq. Discontinue lipids. Malnutrition Assessment:  Malnutrition Status: At risk for malnutrition (Comment) (due to breathing difficulty & spitting food out) (11/11/22 0986)    Context:  Acute Illness     Findings of the 6 clinical characteristics of malnutrition:  Energy Intake:  50% or less of estimated energy requirements for 5 or more days  Weight Loss:  Unable to assess     Body Fat Loss:  Unable to assess     Muscle Mass Loss:  Unable to assess    Fluid Accumulation:  No significant fluid accumulation     Strength:  Not Performed    Nutrition Assessment:    Pt resistant to BiPAP but has been using it at night along with Precedex. Pt consumed the majority of breakfast and lunch today, including supplements. TPN possibly to be discontinued; await clarification from physician. Discontinue lipids. Nutrition Related Findings:    Edema: +1 Generalized, Facia; Trace RUEBENNYE. Labs and meds reviewed. Wound Type: None       Current Nutrition Intake & Therapies:    Average Meal Intake: %  Average Supplements Intake: %  ADULT DIET; Regular  PN-ADULT PREMIX 5/20 - [facility]  ADULT ORAL NUTRITION SUPPLEMENT; Breakfast, Lunch, Dinner; Diabetic Oral Supplement  Current Parenteral Nutrition Orders:  Type and Formula: 2-in-1 Custom   Lipids: None  Duration: Continuous  Rate/Volume: 41.6 ml/ 998 ml  Current PN Order Provides: 880 kcal and 50 gm protein    Anthropometric Measures:  Height: 4' 11\" (149.9 cm)  Ideal Body Weight (IBW): 95 lbs (43 kg)    Admission Body Weight: 128 lb (58.1 kg)  Current Body Weight: 129 lb 3 oz (58.6 kg), 134.7 % IBW.  Weight Source: Bed Scale  Current BMI (kg/m2): 26.1  Usual Body Weight: 162 lb (73.5 kg) (11-1-2019)  % Weight Change (Calculated): -21                    BMI Categories: Overweight (BMI 25.0-29. 9)    Estimated Daily Nutrient Needs:  Energy Requirements Based On: Kcal/kg  Weight Used for Energy Requirements:  (58.5kg)  Energy (kcal/day): 8056-9406 kcals based on 25-27 kcals/kg  Weight Used for Protein Requirements:  (58.5kg)  Protein (g/day): 76-82 gm protein based on 1.3-1.4 gm/kg    Nutrition Diagnosis:   Inadequate oral intake related to impaired respiratory function as evidenced by  (Previous intake 0-25%)    Nutrition Interventions:   Food and/or Nutrient Delivery: Continue Current Diet, Continue Oral Nutrition Supplement, Modify Parenteral Nutrition (unless otherwise ordered)  Nutrition Education/Counseling: No recommendation at this time  Coordination of Nutrition Care: Continue to monitor while inpatient       Goals:  Previous Goal Met: Progressing toward Goal(s)  Goals: PO intake 75% or greater       Nutrition Monitoring and Evaluation:   Behavioral-Environmental Outcomes: None Identified  Food/Nutrient Intake Outcomes: Food and Nutrient Intake, Supplement Intake, Parenteral Nutrition Intake/Tolerance  Physical Signs/Symptoms Outcomes: Biochemical Data, Chewing or Swallowing, GI Status, Nutrition Focused Physical Findings, Skin, Weight    Discharge Planning:     Too soon to determine     Cleo Rivers RD, LD  Contact: [phone]

## 2022-11-13 NOTE — PROGRESS NOTES
Pulmonary Progress Note  [facility]      Patient - Horacio Cunningham,  Age - 61 y.o.    - 1965      Room Number -    MRN -  [id number]   Acct # - [de-identified]  Date of Admission -  2022 11:16 AM        Malcom Ybarra MD  Primary Care Physician - Gilma Carrasco DTR     SUBJECTIVE   Patient appears to be in fair spirits. Currently on 4 L nasal cannula. O2 saturation 91 to 93%. No apparent fevers. Blood pressures on the higher side of normal to hypertensive    OBJECTIVE   VITALS    height is 4' 11\" (1.499 m) and weight is 129 lb 3 oz (58.6 kg). Her oral temperature is 97.4 °F (36.3 °C). Her blood pressure is 145/65 (abnormal) and her pulse is 67. Her respiration is 21 and oxygen saturation is 92%. Body mass index is 26.09 kg/m². Temperature Range: Temp: 97.4 °F (36.3 °C) Temp  Av.1 °F (36.7 °C)  Min: 97.4 °F (36.3 °C)  Max: 98.5 °F (36.9 °C)  BP Range:  Systolic (61OZO), OOS:793 , Min:128 , VFV:309     Diastolic (18EVC), QHY:58, Min:42, Max:121    Pulse Range: Pulse  Av.6  Min: 58  Max: 116  Respiration Range: Resp  Av.5  Min: 18  Max: 29  Current Pulse Ox[de-identified]  SpO2: 92 %  24HR Pulse Ox Range:  SpO2  Av.1 %  Min: 84 %  Max: 98 %  Oxygen Amount and Delivery: O2 Flow Rate (L/min): 4 L/min    Wt Readings from Last 3 Encounters:   22 129 lb 3 oz (58.6 kg)   19 162 lb 14.7 oz (73.9 kg)   18 150 lb (68 kg)       I/O (24 Hours)    Intake/Output Summary (Last 24 hours) at 2022 1055  Last data filed at 2022 1900  Gross per 24 hour   Intake --   Output 150 ml   Net -150 ml       EXAM     General Appearance  Awake, alert, oriented, in no acute distress  HEENT - normocephalic, atraumatic. Neck - Supple,  trachea midline   Lungs -coarse breath sounds no crackles rales or wheeze  Heart Exam:PMI normal. No lifts, heaves, or thrills. RRR.  No murmurs, clicks, gallops, or rubs  Abdomen Exam: Abdomen soft, non-tender.    Extremity Exam: Coarse breath sounds    MEDS      hydroCHLOROthiazide  25 mg Oral Daily    insulin lispro  0-8 Units SubCUTAneous TID WC    insulin lispro  0-4 Units SubCUTAneous Nightly    potassium chloride  40 mEq Oral Once    lisinopril  40 mg Oral Daily    dilTIAZem  30 mg Oral 4 times per day    [Held by provider] hydrALAZINE  10 mg IntraVENous Q6H    fat emulsion  100 mL IntraVENous Daily    ipratropium-albuterol  1 ampule Inhalation Q4H    doxycycline monohydrate  100 mg Oral 2 times per day    cefepime  2,000 mg IntraVENous Q8H    sodium chloride flush  5-40 mL IntraVENous 2 times per day    enoxaparin  40 mg SubCUTAneous Daily    atorvastatin  40 mg Oral Daily    montelukast  10 mg Oral Nightly    benzonatate  200 mg Oral TID    dextromethorphan  60 mg Oral 2 times per day    nicotine  1 patch TransDERmal Daily    methylPREDNISolone  40 mg IntraVENous Q6H      PN-Adult Premix 5/20 - [facility] 41.6 mL/hr at 11/12/22 1840    dextrose      niCARdipine      dexmedetomidine Stopped (11/13/22 0740)    sodium chloride       potassium chloride **OR** potassium alternative oral replacement **OR** potassium chloride, hydrALAZINE, metoprolol, sodium phosphate IVPB **OR** sodium phosphate IVPB **OR** sodium phosphate IVPB, glucose, dextrose bolus **OR** dextrose bolus, glucagon (rDNA), dextrose, perflutren lipid microspheres, sodium chloride flush, sodium chloride flush, sodium chloride, ondansetron **OR** ondansetron, polyethylene glycol, acetaminophen **OR** acetaminophen    LABS   CBC   Recent Labs     11/13/22  0402   WBC 17.6*   HGB 14.5   HCT 45.4   MCV 92.3        BMP:   Lab Results   Component Value Date/Time     11/13/2022 04:02 AM    K 3.4 11/13/2022 04:02 AM    CL 93 11/13/2022 04:02 AM    CO2 35 11/13/2022 04:02 AM    BUN 20 11/13/2022 04:02 AM    LABALBU 3.3 11/12/2022 04:17 AM    CREATININE <0.40 11/13/2022 04:02 AM    CALCIUM 8.3 11/13/2022 04:02 AM    GFRAA >60 11/01/2019 05:24 AM    LABGLOM Can not be calculated 11/13/2022 04:02 AM     ABGs:  Lab Results   Component Value Date/Time    PHART 7.325 11/10/2022 05:52 PM    PO2ART 70.6 11/10/2022 05:52 PM    PZU2BPI 81.2 11/10/2022 05:52 PM      Lab Results   Component Value Date/Time    MODE BIPAP 11/10/2022 05:52 PM     Ionized Calcium:  No results found for: IONCA  Magnesium:    Lab Results   Component Value Date/Time    MG 2.1 11/13/2022 04:02 AM     Phosphorus:    Lab Results   Component Value Date/Time    PHOS 2.4 11/13/2022 04:02 AM        LIVER PROFILE   Recent Labs     11/12/22  0417   AST 13   ALT 11   BILITOT 0.3   ALKPHOS 49     INR No results for input(s): INR in the last 72 hours. PTT   Lab Results   Component Value Date    APTT 30.0 11/08/2022         RADIOLOGY     (See actual reports for details)    ASSESSMENT/PLAN     Patient Active Problem List   Diagnosis    Acute respiratory failure with hypoxia and hypercapnia (HCC)    Pneumonia    Acute exacerbation of chronic obstructive pulmonary disease (COPD) ([facility] [address]    Tobacco abuse    Chronic respiratory failure (HCC)    COPD exacerbation (HCC)    Hyponatremia    Influenza    Chronic obstructive pulmonary disease with hypoxia (HCC)    Acute respiratory failure (HCC)     Acute hypoxic respiratory failure    Chronic respiratory failure with severe hypercapnia. PCO2 in the 60s with normal pH. Multifocal pneumonia-most likely atypical/adenovirus  Acute exacerbation COPD  Poorly controlled hypertension  Hyponatremia-most likely SIADH from underlying pulmonary issues  Chronic hypercapnic respiratory failure  History of tobacco abuse  Medical noncompliance  Hypertension. Full code. On Maxipime and doxycycline  On Lovenox for DVT prophylaxis. Crease Solu-Medrol from 40 every 6 hours to 30 every 8 hours. On DuoNeb treatments. I did confer with bedside nurse.   I would transfer her to [facility], however, I was told that on BiPAP, she needs Precedex.     Electronically signed by Jose Cruz Driver MD on 11/13/2022 at 10:55 AM

## 2022-11-13 NOTE — PROGRESS NOTES
[facility] -   Infectious diseases evaluation  admission date 11/8/2022    reason for consultation:   Atypical pneumonia    Impression :   Current:  Adenovirus respiratory infection with possible superimposed bacterial infection. Acute COPD exacerbation  Hyponatremia  Chronic hypercapnic respiratory failure  History of smoking    Recommendations   QuantiFERON-TB test was negative  IV cefepime and doxycycline  Nasal swab for MRSA was negative  Procalcitonin level was 0.1 and lactic acid was 0.8 on 11/10/2022  Mycoplasma IgM negative  Legionella urine antigen negative  Normal valdez growth on respiratory culture  HIV screen negative  Respiratory panel was positive for adenovirus PCR  Discussed with nursing staff  Continue supportive care    Infection Control Recommendations   [place] Precautions  Droplet Isolation      Antimicrobial Stewardship Recommendations   Simplification of therapy  Targeted therapy      History of Present Illness:   Initial history:  Pao Calvo is a 61y.o.-year-old female with history of smoking, COPD, chronic hypercapnic respiratory failure, noncompliant with home medication. She presented to hospital with worsening shortness of breath associated with cough productive of green phlegm for several days, symptoms moderate to severe. CT chest was negative for pulmonary embolism but showed multiple reticular nodule infiltrates in the upper lobes right more than left. Respiratory panel was positive for adenovirus PCR. The patient denied sick contact, no recent travel, denied hemoptysis, no chronic fatigue or night sweats. She is actively smoking, denied alcohol or drug abuse. Interval changes  11/13/2022   She is on 4 L of oxygen per nasal cannula, afebrile, on TPN, no new events.   Patient Vitals for the past 8 hrs:   BP Temp Temp src Pulse Resp SpO2   11/13/22 1130 (!) 160/72 98.1 °F (36.7 °C) Oral 94 18 98 %   11/13/22 1054 -- -- -- 80 21 95 %   22 1030 (!) 181/73 -- -- 81 27 95 %   22 0900 (!) 145/65 -- -- 67 21 92 %   22 0740 (!) 163/74 97.4 °F (36.3 °C) Oral 71 23 91 %   22 0722 -- -- -- 76 23 93 %   22 0700 (!) 172/84 -- -- 79 27 97 %   22 0600 (!) 143/121 -- -- 58 18 96 %               I have personally reviewed the past medical history, past surgical history, medications, social history, and family history, and I haveupdated the database accordingly. Allergies:   Patient has no known allergies. Review of Systems:     Review of Systems  As per history of present illness, other than above 12 system review was negative  Physical Examination :       Physical Exam  HENT:      Right Ear: External ear normal.      Left Ear: External ear normal.      Mouth/Throat:      Pharynx: No oropharyngeal exudate. Eyes:      General: No scleral icterus. Conjunctiva/sclera: Conjunctivae normal.   Cardiovascular:      Rate and Rhythm: Normal rate and regular rhythm. Pulmonary:      Effort: Pulmonary effort is normal.      Breath sounds: No wheezing or rhonchi. Abdominal:      General: There is no distension. Palpations: Abdomen is soft. Tenderness: There is no abdominal tenderness. Musculoskeletal:      Cervical back: Neck supple. No rigidity. Right lower leg: No edema. Left lower leg: No edema. Skin:     General: Skin is warm. Coloration: Skin is not jaundiced. Neurological:      Mental Status: She is alert.        Past Medical History:     Past Medical History:   Diagnosis Date    Acute respiratory failure ([address]     due to COPD exacerbation    COPD (chronic obstructive pulmonary disease) ([address]     with hypoxia    Cough     Current every day smoker     Dyspnea     Heart murmur     as a child    Hypertension     Shortness of breath        Past Surgical  History:     Past Surgical History:   Procedure Laterality Date     SECTION         Medications:      hydroCHLOROthiazide 25 mg Oral Daily    insulin lispro  0-8 Units SubCUTAneous TID WC    insulin lispro  0-4 Units SubCUTAneous Nightly    lisinopril  40 mg Oral Daily    dilTIAZem  30 mg Oral 4 times per day    [Held by provider] hydrALAZINE  10 mg IntraVENous Q6H    fat emulsion  100 mL IntraVENous Daily    ipratropium-albuterol  1 ampule Inhalation Q4H    doxycycline monohydrate  100 mg Oral 2 times per day    cefepime  2,000 mg IntraVENous Q8H    sodium chloride flush  5-40 mL IntraVENous 2 times per day    enoxaparin  40 mg SubCUTAneous Daily    atorvastatin  40 mg Oral Daily    montelukast  10 mg Oral Nightly    benzonatate  200 mg Oral TID    dextromethorphan  60 mg Oral 2 times per day    nicotine  1 patch TransDERmal Daily    methylPREDNISolone  40 mg IntraVENous Q6H       Social History:     Social History     Socioeconomic History    Marital status: Legally      Spouse name: Not on file    Number of children: Not on file    Years of education: Not on file    Highest education level: Not on file   Occupational History    Not on file   Tobacco Use    Smoking status: Every Day     Packs/day: 0.50     Types: Cigarettes    Smokeless tobacco: Never   Vaping Use    Vaping Use: Some days   Substance and Sexual Activity    Alcohol use: No    Drug use: No    Sexual activity: Not on file   Other Topics Concern    Not on file   Social History Narrative    Not on file     Social Determinants of Health     Financial Resource Strain: Not on file   Food Insecurity: Not on file   Transportation Needs: Not on file   Physical Activity: Not on file   Stress: Not on file   Social Connections: Not on file   Intimate Partner Violence: Not on file   Housing Stability: Not on file       Family History:   History reviewed. No pertinent family history.    Medical Decision Making:   I have independently reviewed/ordered the following labs:    CBC with Differential:   Recent Labs     11/12/22 0417 11/13/22  0402   WBC 17.7* 17.6*   HGB 15.3 14.5   HCT 47.4* 45.4    320       BMP:  Recent Labs     11/12/22  0417 11/13/22  0402    132*   K 3.9 3.4*   CL 95* 93*   CO2 39* 35*   BUN 25* 20   CREATININE 0.46* <0.40*   MG 2.5 2.1       Hepatic Function Panel:   Recent Labs     11/11/22  1035 11/12/22  0417   PROT  --  6.5   LABALBU 3.4* 3.3*   BILITOT  --  0.3   ALKPHOS  --  49   ALT  --  11   AST  --  13       No results for input(s): RPR in the last 72 hours. No results for input(s): HIV in the last 72 hours. No results for input(s): BC in the last 72 hours. Lab Results   Component Value Date/Time    CREATININE <0.40 11/13/2022 04:02 AM    GLUCOSE 234 11/13/2022 04:02 AM       Detailed results: Thank you for allowing us to participate in the care of this patient. Please call with questions. This note is created with the assistance of a speech recognition program.  While intending to generate adocument that actually reflects the content of the visit, the document can still have some errors including those of syntax and sound a like substitutions which may escape proof reading. It such instances, actual meaningcan be extrapolated by contextual diversion.     Laurie Worthy MD  Office: [phone]  Perfect serve / office [phone]

## 2022-11-13 NOTE — CARE COORDINATION
ONGOING DISCHARGE PLAN:    Writer reviewed chart notes. Pt. Is from home. VNS has been denied. Pt. Is + for Adenovirus. Remains on IV Cefepime/ PO Doxy, WBC 17.6. ID on board. Remains on IV steroids, 40 Q6. Follow for Home Oxygen needs, as well as a new Nebulizer. Currently on 3LNC Oxygen, sating 90%. Pulmonary on board. Will continue to follow for additional discharge needs.     Electronically signed by Peña Wiley RN on 11/13/2022 at 4:50 PM

## 2022-11-13 NOTE — PROGRESS NOTES
PATIENT REFUSES TO WEAR BIPAP     [x] Risks and benefits explained to patient   [] Patient refuses to wear Bipap stating \"I'm not putting that on. \"  Numerous trips to room to encourage pt to wear mask. Adamantly refuses at this time. Will continue to try.

## 2022-11-13 NOTE — PLAN OF CARE
Problem: Discharge Planning  Goal: Discharge to home or other facility with appropriate resources  11/13/2022 0703 by Lou Nelson RN  Outcome: Progressing  11/12/2022 1957 by Shweta Carter RN  Outcome: Progressing  Flowsheets (Taken 11/12/2022 1600)  Discharge to home or other facility with appropriate resources: Identify barriers to discharge with patient and caregiver     Problem: Safety - Adult  Goal: Free from fall injury  11/13/2022 0703 by Lou Nelson RN  Outcome: Progressing  11/12/2022 1957 by Shweta Carter RN  Outcome: [place] (Taken 11/12/2022 0700 by Zainab Ballesteros RN)  Free From Fall Injury: Instruct family/caregiver on patient safety     Problem: Pain  Goal: Verbalizes/displays adequate comfort level or baseline comfort level  11/13/2022 0703 by Lou Nelson RN  Outcome: Progressing  11/12/2022 1957 by Shweta Carter RN  Outcome: Progressing     Problem: Respiratory - Adult  Goal: Achieves optimal ventilation and oxygenation  11/13/2022 0703 by Lou Nelson RN  Outcome: Progressing  11/12/2022 1957 by Shweta Carter RN  Outcome: Progressing  Flowsheets  Taken 11/12/2022 1600 by Shweta Carter RN  Achieves optimal ventilation and oxygenation:   Assess for changes in respiratory status   Assess for changes in mentation and behavior   Position to facilitate oxygenation and minimize respiratory effort   Oxygen supplementation based on oxygen saturation or arterial blood gases   Initiate smoking cessation protocol as indicated   Assess the need for suctioning and aspirate as needed   Assess and instruct to report shortness of breath or any respiratory difficulty   Respiratory therapy support as indicated  Taken 11/12/2022 0700 by Zainab Ballesteros RN  Achieves optimal ventilation and oxygenation:   Assess for changes in respiratory status   Assess for changes in mentation and behavior Position to facilitate oxygenation and minimize respiratory effort   Oxygen supplementation based on oxygen saturation or arterial blood gases   Initiate smoking cessation protocol as indicated   Respiratory therapy support as indicated   Assess and instruct to report shortness of breath or any respiratory difficulty

## 2022-11-14 LAB
ANION GAP SERPL CALCULATED.3IONS-SCNC: 6 MMOL/L (ref 9–17)
BUN BLDV-MCNC: 15 MG/DL (ref 6–20)
CALCIUM SERPL-MCNC: 9.2 MG/DL (ref 8.6–10.4)
CHLORIDE BLD-SCNC: 92 MMOL/L (ref 98–107)
CO2: 34 MMOL/L (ref 20–31)
CREAT SERPL-MCNC: 0.4 MG/DL (ref 0.5–0.9)
GFR SERPL CREATININE-BSD FRML MDRD: >60 ML/MIN/1.73M2
GLUCOSE BLD-MCNC: 121 MG/DL (ref 65–105)
GLUCOSE BLD-MCNC: 136 MG/DL (ref 65–105)
GLUCOSE BLD-MCNC: 137 MG/DL (ref 65–105)
GLUCOSE BLD-MCNC: 141 MG/DL (ref 65–105)
GLUCOSE BLD-MCNC: 143 MG/DL (ref 70–99)
HCT VFR BLD CALC: 45.2 % (ref 36–46)
HEMOGLOBIN: 15 G/DL (ref 12–16)
MAGNESIUM: 1.9 MG/DL (ref 1.6–2.6)
MCH RBC QN AUTO: 29.9 PG (ref 26–34)
MCHC RBC AUTO-ENTMCNC: 33.2 G/DL (ref 31–37)
MCV RBC AUTO: 89.9 FL (ref 80–100)
PDW BLD-RTO: 13.8 % (ref 11.5–14.9)
PHOSPHORUS: 2.8 MG/DL (ref 2.6–4.5)
PLATELET # BLD: 269 K/UL (ref 150–450)
PMV BLD AUTO: 7.8 FL (ref 6–12)
POTASSIUM SERPL-SCNC: 3.3 MMOL/L (ref 3.7–5.3)
RBC # BLD: 5.03 M/UL (ref 4–5.2)
SODIUM BLD-SCNC: 132 MMOL/L (ref 135–144)
WBC # BLD: 21.1 K/UL (ref 3.5–11)

## 2022-11-14 PROCEDURE — 6370000000 HC RX 637 (ALT 250 FOR IP): Performed by: INTERNAL MEDICINE

## 2022-11-14 PROCEDURE — 6360000002 HC RX W HCPCS: Performed by: STUDENT IN AN ORGANIZED HEALTH CARE EDUCATION/TRAINING PROGRAM

## 2022-11-14 PROCEDURE — 80048 BASIC METABOLIC PNL TOTAL CA: CPT

## 2022-11-14 PROCEDURE — 6360000002 HC RX W HCPCS: Performed by: INTERNAL MEDICINE

## 2022-11-14 PROCEDURE — 97530 THERAPEUTIC ACTIVITIES: CPT

## 2022-11-14 PROCEDURE — 6370000000 HC RX 637 (ALT 250 FOR IP): Performed by: STUDENT IN AN ORGANIZED HEALTH CARE EDUCATION/TRAINING PROGRAM

## 2022-11-14 PROCEDURE — 84100 ASSAY OF PHOSPHORUS: CPT

## 2022-11-14 PROCEDURE — 85027 COMPLETE CBC AUTOMATED: CPT

## 2022-11-14 PROCEDURE — 2580000003 HC RX 258: Performed by: INTERNAL MEDICINE

## 2022-11-14 PROCEDURE — 36415 COLL VENOUS BLD VENIPUNCTURE: CPT

## 2022-11-14 PROCEDURE — 94761 N-INVAS EAR/PLS OXIMETRY MLT: CPT

## 2022-11-14 PROCEDURE — 2060000000 HC ICU INTERMEDIATE R&B

## 2022-11-14 PROCEDURE — 82947 ASSAY GLUCOSE BLOOD QUANT: CPT

## 2022-11-14 PROCEDURE — 2700000000 HC OXYGEN THERAPY PER DAY

## 2022-11-14 PROCEDURE — 6360000002 HC RX W HCPCS: Performed by: NURSE PRACTITIONER

## 2022-11-14 PROCEDURE — 94640 AIRWAY INHALATION TREATMENT: CPT

## 2022-11-14 PROCEDURE — 99233 SBSQ HOSP IP/OBS HIGH 50: CPT | Performed by: INTERNAL MEDICINE

## 2022-11-14 PROCEDURE — 2500000003 HC RX 250 WO HCPCS: Performed by: INTERNAL MEDICINE

## 2022-11-14 PROCEDURE — 99232 SBSQ HOSP IP/OBS MODERATE 35: CPT | Performed by: INTERNAL MEDICINE

## 2022-11-14 PROCEDURE — 6370000000 HC RX 637 (ALT 250 FOR IP)

## 2022-11-14 PROCEDURE — 83735 ASSAY OF MAGNESIUM: CPT

## 2022-11-14 RX ORDER — HYDRALAZINE HYDROCHLORIDE 25 MG/1
25 TABLET, FILM COATED ORAL EVERY 8 HOURS SCHEDULED
Status: DISCONTINUED | OUTPATIENT
Start: 2022-11-14 | End: 2022-11-15

## 2022-11-14 RX ORDER — LORAZEPAM 0.5 MG/1
0.5 TABLET ORAL PRN
Status: DISCONTINUED | OUTPATIENT
Start: 2022-11-14 | End: 2022-11-17 | Stop reason: HOSPADM

## 2022-11-14 RX ORDER — METHYLPREDNISOLONE SODIUM SUCCINATE 40 MG/ML
30 INJECTION, POWDER, LYOPHILIZED, FOR SOLUTION INTRAMUSCULAR; INTRAVENOUS EVERY 8 HOURS
Status: DISCONTINUED | OUTPATIENT
Start: 2022-11-14 | End: 2022-11-16

## 2022-11-14 RX ADMIN — LORAZEPAM 0.5 MG: 0.5 TABLET ORAL at 20:57

## 2022-11-14 RX ADMIN — MONTELUKAST 10 MG: 10 TABLET, FILM COATED ORAL at 20:57

## 2022-11-14 RX ADMIN — Medication 60 MG: at 20:57

## 2022-11-14 RX ADMIN — CEFEPIME 2000 MG: 2 INJECTION, POWDER, FOR SOLUTION INTRAVENOUS at 14:29

## 2022-11-14 RX ADMIN — SODIUM CHLORIDE 5 MG/HR: 9 INJECTION, SOLUTION INTRAVENOUS at 04:39

## 2022-11-14 RX ADMIN — LISINOPRIL 40 MG: 20 TABLET ORAL at 08:41

## 2022-11-14 RX ADMIN — DILTIAZEM HYDROCHLORIDE 30 MG: 30 TABLET, FILM COATED ORAL at 12:45

## 2022-11-14 RX ADMIN — HYDRALAZINE HYDROCHLORIDE 25 MG: 25 TABLET, FILM COATED ORAL at 14:21

## 2022-11-14 RX ADMIN — HYDRALAZINE HYDROCHLORIDE 25 MG: 25 TABLET, FILM COATED ORAL at 09:09

## 2022-11-14 RX ADMIN — DILTIAZEM HYDROCHLORIDE 30 MG: 30 TABLET, FILM COATED ORAL at 18:15

## 2022-11-14 RX ADMIN — DOXYCYCLINE 100 MG: 100 CAPSULE ORAL at 08:41

## 2022-11-14 RX ADMIN — DILTIAZEM HYDROCHLORIDE 30 MG: 30 TABLET, FILM COATED ORAL at 05:45

## 2022-11-14 RX ADMIN — DOXYCYCLINE 100 MG: 100 CAPSULE ORAL at 20:57

## 2022-11-14 RX ADMIN — FLUTICASONE PROPIONATE 1 SPRAY: 50 SPRAY, METERED NASAL at 08:48

## 2022-11-14 RX ADMIN — BENZONATATE 200 MG: 200 CAPSULE ORAL at 14:22

## 2022-11-14 RX ADMIN — METHYLPREDNISOLONE SODIUM SUCCINATE 30 MG: 40 INJECTION, POWDER, FOR SOLUTION INTRAMUSCULAR; INTRAVENOUS at 18:15

## 2022-11-14 RX ADMIN — BENZONATATE 200 MG: 200 CAPSULE ORAL at 08:41

## 2022-11-14 RX ADMIN — IPRATROPIUM BROMIDE AND ALBUTEROL SULFATE 1 AMPULE: 2.5; .5 SOLUTION RESPIRATORY (INHALATION) at 11:13

## 2022-11-14 RX ADMIN — METHYLPREDNISOLONE SODIUM SUCCINATE 40 MG: 40 INJECTION, POWDER, FOR SOLUTION INTRAMUSCULAR; INTRAVENOUS at 00:09

## 2022-11-14 RX ADMIN — CEFEPIME 2000 MG: 2 INJECTION, POWDER, FOR SOLUTION INTRAVENOUS at 05:48

## 2022-11-14 RX ADMIN — BENZONATATE 200 MG: 200 CAPSULE ORAL at 20:57

## 2022-11-14 RX ADMIN — IPRATROPIUM BROMIDE AND ALBUTEROL SULFATE 1 AMPULE: 2.5; .5 SOLUTION RESPIRATORY (INHALATION) at 20:01

## 2022-11-14 RX ADMIN — HYDRALAZINE HYDROCHLORIDE 25 MG: 25 TABLET, FILM COATED ORAL at 20:57

## 2022-11-14 RX ADMIN — IPRATROPIUM BROMIDE AND ALBUTEROL SULFATE 1 AMPULE: 2.5; .5 SOLUTION RESPIRATORY (INHALATION) at 03:58

## 2022-11-14 RX ADMIN — IPRATROPIUM BROMIDE AND ALBUTEROL SULFATE 1 AMPULE: 2.5; .5 SOLUTION RESPIRATORY (INHALATION) at 07:37

## 2022-11-14 RX ADMIN — POTASSIUM CHLORIDE 40 MEQ: 1500 TABLET, EXTENDED RELEASE ORAL at 05:45

## 2022-11-14 RX ADMIN — HYDROCHLOROTHIAZIDE 25 MG: 25 TABLET ORAL at 08:41

## 2022-11-14 RX ADMIN — ENOXAPARIN SODIUM 40 MG: 100 INJECTION SUBCUTANEOUS at 08:41

## 2022-11-14 RX ADMIN — DILTIAZEM HYDROCHLORIDE 30 MG: 30 TABLET, FILM COATED ORAL at 23:08

## 2022-11-14 RX ADMIN — HYDRALAZINE HYDROCHLORIDE 10 MG: 20 INJECTION INTRAMUSCULAR; INTRAVENOUS at 00:54

## 2022-11-14 RX ADMIN — METHYLPREDNISOLONE SODIUM SUCCINATE 30 MG: 40 INJECTION, POWDER, FOR SOLUTION INTRAMUSCULAR; INTRAVENOUS at 08:40

## 2022-11-14 RX ADMIN — Medication 60 MG: at 08:41

## 2022-11-14 RX ADMIN — ATORVASTATIN CALCIUM 40 MG: 40 TABLET, FILM COATED ORAL at 08:41

## 2022-11-14 RX ADMIN — CEFEPIME 2000 MG: 2 INJECTION, POWDER, FOR SOLUTION INTRAVENOUS at 22:18

## 2022-11-14 RX ADMIN — IPRATROPIUM BROMIDE AND ALBUTEROL SULFATE 1 AMPULE: 2.5; .5 SOLUTION RESPIRATORY (INHALATION) at 15:13

## 2022-11-14 RX ADMIN — HYDRALAZINE HYDROCHLORIDE 10 MG: 20 INJECTION INTRAMUSCULAR; INTRAVENOUS at 22:22

## 2022-11-14 ASSESSMENT — PAIN SCALES - GENERAL
PAINLEVEL_OUTOF10: 0

## 2022-11-14 ASSESSMENT — ENCOUNTER SYMPTOMS
CHEST TIGHTNESS: 0
DIARRHEA: 0
ABDOMINAL PAIN: 0
VOMITING: 0
WHEEZING: 0
COUGH: 1
ABDOMINAL DISTENTION: 0
NAUSEA: 0
SHORTNESS OF BREATH: 1
BACK PAIN: 0
CONSTIPATION: 0
RHINORRHEA: 0

## 2022-11-14 NOTE — PROGRESS NOTES
Comprehensive Nutrition Assessment    Type and Reason for Visit:  Reassess    Nutrition Recommendations/Plan:   Continue diet and supplements as ordered. Malnutrition Assessment:  Malnutrition Status: At risk for malnutrition (Comment) (due to breathing difficulty & spitting food out) (11/11/22 0463)    Context:  Acute Illness     Findings of the 6 clinical characteristics of malnutrition:  Energy Intake:  50% or less of estimated energy requirements for 5 or more days  Weight Loss:  Unable to assess     Body Fat Loss:  Unable to assess     Muscle Mass Loss:  Unable to assess    Fluid Accumulation:  No significant fluid accumulation     Strength:  Not Performed    Nutrition Assessment:    TPN discontinued yesterday. Nurse reports not much of food eaten for breakfast but does like the Glucerna. Oral intake seems to be variable with better intake yesterday. Pt is resistant to wearing the bipap. Nutrition Related Findings:    Edema: +1 Generalized, Facia; Trace RUE, LUE. Labs and meds reviewed. Wound Type: None       Current Nutrition Intake & Therapies:    Average Meal Intake: 1-25%, %  Average Supplements Intake: %  ADULT DIET; Regular  ADULT ORAL NUTRITION SUPPLEMENT; Breakfast, Lunch, Dinner; Diabetic Oral Supplement    Anthropometric Measures:  Height: 4' 11\" (149.9 cm)  Ideal Body Weight (IBW): 95 lbs (43 kg)    Admission Body Weight: 128 lb (58.1 kg)  Current Body Weight: 129 lb 3 oz (58.6 kg), 134.7 % IBW. Weight Source: Bed Scale  Current BMI (kg/m2): 26.1  Usual Body Weight: 162 lb (73.5 kg) (11-1-2019)  % Weight Change (Calculated): -21                    BMI Categories: Overweight (BMI 25.0-29. 9)    Estimated Daily Nutrient Needs:  Energy Requirements Based On: Kcal/kg  Weight Used for Energy Requirements:  (58.5kg)  Energy (kcal/day): 9848-0879 kcals based on 25-27 kcals/kg  Weight Used for Protein Requirements:  (58.5kg)  Protein (g/day): 76-82 gm protein based on 1.3-1.4 gm/kg

## 2022-11-14 NOTE — PROGRESS NOTES
RN attempted to educate patient again on importance of wearing the bipap. Patient refusing and getting verbally aggressive stating we can not make her wear it and stating she will not wear it.

## 2022-11-14 NOTE — PROGRESS NOTES
Pulmonary Progress Note  [facility]      Patient - Bambi Cuba,  Age - 61 y.o.    - 1965      Room Number -    N -  [id number]   [facility]  Date of Admission -  2022 11:16 AM    Jerrell Reid MD  Primary Care Physician - Susana Ocasio, DTR     SUBJECTIVE   Patient in fair spirits. Currently on 4 L nasal cannula. 93% oxygen. I do not see any fevers at this time. OBJECTIVE   VITALS    height is 4' 11\" (1.499 m) and weight is 129 lb 3 oz (58.6 kg). Her axillary temperature is 97.6 °F (36.4 °C). Her blood pressure is 158/71 (abnormal) and her pulse is 82. Her respiration is 20 and oxygen saturation is 93%. Body mass index is 26.09 kg/m². Temperature Range: Temp: 97.6 °F (36.4 °C) Temp  Av.4 °F (36.3 °C)  Min: 97.3 °F (36.3 °C)  Max: 97.6 °F (36.4 °C)  BP Range:  Systolic (30LGR), LEC:710 , Min:124 , NWB:475     Diastolic (28BQL), EKT:50, Min:36, Max:136    Pulse Range: Pulse  Av.7  Min: 72  Max: 96  Respiration Range: Resp  Av.1  Min: 15  Max: 34  Current Pulse Ox[de-identified]  SpO2: 93 %  24HR Pulse Ox Range:  SpO2  Av.9 %  Min: 84 %  Max: 95 %  Oxygen Amount and Delivery: O2 Flow Rate (L/min): 4 L/min    Wt Readings from Last 3 Encounters:   22 129 lb 3 oz (58.6 kg)   19 162 lb 14.7 oz (73.9 kg)   18 150 lb (68 kg)       I/O (24 Hours)    Intake/Output Summary (Last 24 hours) at 2022 1327  Last data filed at 2022 1303  Gross per 24 hour   Intake 1228.31 ml   Output 600 ml   Net 628.31 ml       EXAM     General Appearance  Awake, alert, oriented, in no acute distress  HEENT - normocephalic, atraumatic. Neck - Supple,  trachea midline   Lungs -coarse breath sounds no crackles rales or wheeze  Heart Exam:PMI normal. No lifts, heaves, or thrills. RRR. No murmurs, clicks, gallops, or rubs  Abdomen Exam: Abdomen soft, non-tender.   Extremity Exam: No signs of cyanosis    MEDS      methylPREDNISolone  30 mg IntraVENous Q8H    hydrALAZINE  25 mg Oral 3 times per day    hydroCHLOROthiazide  25 mg Oral Daily    insulin lispro  0-8 Units SubCUTAneous TID WC    insulin lispro  0-4 Units SubCUTAneous Nightly    fluticasone  1 spray Each Nostril Daily    lisinopril  40 mg Oral Daily    dilTIAZem  30 mg Oral 4 times per day    ipratropium-albuterol  1 ampule Inhalation Q4H    doxycycline monohydrate  100 mg Oral 2 times per day    cefepime  2,000 mg IntraVENous Q8H    sodium chloride flush  5-40 mL IntraVENous 2 times per day    enoxaparin  40 mg SubCUTAneous Daily    atorvastatin  40 mg Oral Daily    montelukast  10 mg Oral Nightly    benzonatate  200 mg Oral TID    dextromethorphan  60 mg Oral 2 times per day    nicotine  1 patch TransDERmal Daily      dextrose      niCARdipine Stopped (11/14/22 0853)    dexmedetomidine Stopped (11/13/22 0740)    sodium chloride       potassium chloride **OR** potassium alternative oral replacement **OR** potassium chloride, hydrALAZINE, sodium phosphate IVPB **OR** sodium phosphate IVPB **OR** sodium phosphate IVPB, glucose, dextrose bolus **OR** dextrose bolus, glucagon (rDNA), dextrose, perflutren lipid microspheres, sodium chloride flush, sodium chloride flush, sodium chloride, ondansetron **OR** ondansetron, polyethylene glycol, acetaminophen **OR** acetaminophen    LABS   CBC   Recent Labs     11/14/22  0440   WBC 21.1*   HGB 15.0   HCT 45.2   MCV 89.9        BMP:   Lab Results   Component Value Date/Time     11/14/2022 04:40 AM    K 3.3 11/14/2022 04:40 AM    CL 92 11/14/2022 04:40 AM    CO2 34 11/14/2022 04:40 AM    BUN 15 11/14/2022 04:40 AM    LABALBU 3.3 11/12/2022 04:17 AM    CREATININE 0.40 11/14/2022 04:40 AM    CALCIUM 9.2 11/14/2022 04:40 AM    GFRAA >60 11/01/2019 05:24 AM    LABGLOM >60 11/14/2022 04:40 AM     ABGs:  Lab Results   Component Value Date/Time    PHART 7.325 11/10/2022 05:52 PM    PO2ART 70.6 11/10/2022 05:52 PM    IQD5PCO 81.2 11/10/2022 05:52 PM      Lab Results   Component Value Date/Time    MODE BIPAP 11/10/2022 05:52 PM     Ionized Calcium:  No results found for: IONCA  Magnesium:    Lab Results   Component Value Date/Time    MG 1.9 11/14/2022 04:40 AM     Phosphorus:    Lab Results   Component Value Date/Time    PHOS 2.8 11/14/2022 04:40 AM        LIVER PROFILE   Recent Labs     11/12/22  0417   AST 13   ALT 11   BILITOT 0.3   ALKPHOS 49     INR No results for input(s): INR in the last 72 hours. PTT   Lab Results   Component Value Date    APTT 30.0 11/08/2022         RADIOLOGY     (See actual reports for details)    ASSESSMENT/PLAN     Patient Active Problem List   Diagnosis    Acute respiratory failure with hypoxia and hypercapnia (HCC)    Pneumonia    Acute exacerbation of chronic obstructive pulmonary disease (COPD) (HCC)    Tobacco abuse    Chronic respiratory failure (HCC)    COPD exacerbation (HCC)    Hyponatremia    Influenza    Chronic obstructive pulmonary disease ([address]    Acute respiratory failure (HCC)     Acute hypoxic respiratory failure  Chronic respiratory failure with severe hypercapnia. PCO2 in the 60s with normal pH. Multifocal pneumonia-most likely atypical/adenovirus  Acute exacerbation COPD  Poorly controlled hypertension  Hyponatremia-most likely SIADH from underlying pulmonary issues  Chronic hypercapnic respiratory failure  History of tobacco abuse  Medical noncompliance  Hypertension. Full code. Currently on Maxipime and doxycycline. On Lovenox for DVT prophylaxis. Decrease Solu-Medrol to 30 mg every 8 hours  On NicoDerm patch  Rest of unit at other services discretion.   Electronically signed by Rufus Cruz MD on 11/14/2022 at 1:27 PM

## 2022-11-14 NOTE — PROGRESS NOTES
PATIENT REFUSES TO WEAR BIPAP     [x] Risks and benefits explained to patient   [x] Patient refuses to wear Bipap stating I can't  [x] Patient verbalizes understanding of information presented. Pt unable to tolerate bipap for more than a few minutes without anxiety attack.

## 2022-11-14 NOTE — PLAN OF CARE
Problem: Discharge Planning  Goal: Discharge to home or other facility with appropriate resources  11/14/2022 0603 by Lizeth Betancourt RN  Outcome: Progressing  11/13/2022 1653 by Neville Bishop RN  Outcome: Progressing  Flowsheets  Taken 11/13/2022 1615  Discharge to home or other facility with appropriate resources: Refer to discharge planning if patient needs post-hospital services based on physician order or complex needs related to functional status, cognitive ability or social support system  Taken 11/13/2022 1200  Discharge to home or other facility with appropriate resources: Refer to discharge planning if patient needs post-hospital services based on physician order or complex needs related to functional status, cognitive ability or social support system  Taken 11/13/2022 0900  Discharge to home or other facility with appropriate resources: Refer to discharge planning if patient needs post-hospital services based on physician order or complex needs related to functional status, cognitive ability or social support system     Problem: Safety - Adult  Goal: Free from fall injury  11/14/2022 0603 by Lizeth Betancourt RN  Outcome: Progressing  11/13/2022 1653 by Neville Bishop RN  Outcome: Progressing     Problem: Pain  Goal: Verbalizes/displays adequate comfort level or baseline comfort level  11/14/2022 0603 by Lizeth Betancourt RN  Outcome: Progressing  11/13/2022 1653 by Neville Bishop RN  Outcome: Progressing  Flowsheets  Taken 11/13/2022 1600  Verbalizes/displays adequate comfort level or baseline comfort level:   Encourage patient to monitor pain and request assistance   Assess pain using appropriate pain scale   Administer analgesics based on type and severity of pain and evaluate response   Implement non-pharmacological measures as appropriate and evaluate response  Taken 11/13/2022 1130  Verbalizes/displays adequate comfort level or baseline comfort level:   Encourage patient to monitor pain and request assistance   Assess pain using appropriate pain scale   Administer analgesics based on type and severity of pain and evaluate response     Problem: Respiratory - Adult  Goal: Achieves optimal ventilation and oxygenation  11/14/2022 0603 by Werner Councilman, RN  Outcome: Progressing  11/13/2022 1653 by Sarah Alfaro RN  Outcome: Progressing  Flowsheets  Taken 11/13/2022 1200  Achieves optimal ventilation and oxygenation:   Assess for changes in respiratory status   Assess for changes in mentation and behavior   Position to facilitate oxygenation and minimize respiratory effort   Oxygen supplementation based on oxygen saturation or arterial blood gases  Taken 11/13/2022 0900  Achieves optimal ventilation and oxygenation:   Assess for changes in respiratory status   Oxygen supplementation based on oxygen saturation or arterial blood gases   Position to facilitate oxygenation and minimize respiratory effort   Assess for changes in mentation and behavior     Problem: Cardiovascular - Adult  Goal: Maintains optimal cardiac output and hemodynamic stability  11/14/2022 0603 by Werner Councilman, RN  Outcome: Progressing  11/13/2022 1653 by Sarah Alfaro RN  Outcome: Progressing  Flowsheets  Taken 11/13/2022 1615  Maintains optimal cardiac output and hemodynamic stability:   Monitor blood pressure and heart rate   Monitor urine output and notify Licensed Independent Practitioner for values outside of normal range   Assess for signs of decreased cardiac output  Taken 11/13/2022 1200  Maintains optimal cardiac output and hemodynamic stability:   Monitor blood pressure and heart rate   Monitor urine output and notify Licensed Independent Practitioner for values outside of normal range   Assess for signs of decreased cardiac output  Taken 11/13/2022 0900  Maintains optimal cardiac output and hemodynamic stability:   Monitor blood pressure and heart rate   Monitor urine output and notify Licensed Independent Practitioner for values outside of normal range   Assess for signs of decreased cardiac output  Goal: Absence of cardiac dysrhythmias or at baseline  11/14/2022 0603 by Inga Fonseca RN  Outcome: Progressing  11/13/2022 1653 by Isabel Birmingham RN  Outcome: Progressing  Flowsheets  Taken 11/13/2022 1615  Absence of cardiac dysrhythmias or at baseline:   Monitor cardiac rate and rhythm   Assess for signs of decreased cardiac output  Taken 11/13/2022 1200  Absence of cardiac dysrhythmias or at baseline:   Monitor cardiac rate and rhythm   Assess for signs of decreased cardiac output  Taken 11/13/2022 0900  Absence of cardiac dysrhythmias or at baseline:   Monitor cardiac rate and rhythm   Assess for signs of decreased cardiac output     Problem: Skin/Tissue Integrity - Adult  Goal: Skin integrity remains intact  11/14/2022 0603 by Inga Fonseca RN  Outcome: Progressing  11/13/2022 1653 by Isabel Birmingham RN  Outcome: Progressing  Flowsheets  Taken 11/13/2022 1615  Skin Integrity Remains Intact:   Monitor for areas of redness and/or skin breakdown   Assess vascular access sites hourly   Every 4-6 hours minimum: Change oxygen saturation probe site  Taken 11/13/2022 1200  Skin Integrity Remains Intact:   Monitor for areas of redness and/or skin breakdown   Assess vascular access sites hourly  Taken 11/13/2022 0900  Skin Integrity Remains Intact:   Monitor for areas of redness and/or skin breakdown   Assess vascular access sites hourly  Goal: Oral mucous membranes remain intact  11/14/2022 0603 by Inga Fonseca RN  Outcome: Progressing  11/13/2022 1653 by Isabel Birmingham RN  Outcome: Progressing  Flowsheets  Taken 11/13/2022 1615  Oral Mucous Membranes Remain Intact:   Assess oral mucosa and hygiene practices   Implement preventative oral hygiene regimen  Taken 11/13/2022 1200  Oral Mucous Membranes Remain Intact:   Implement preventative oral hygiene regimen   Assess oral mucosa and hygiene practices  Taken 11/13/2022 0900  Oral Mucous Membranes Remain Intact:   Assess oral mucosa and hygiene practices   Implement preventative oral hygiene regimen     Problem: Death & Dying  Goal: Pt/Family communicate acceptance of impending death and feel psychological comfort and peace  Description: INTERVENTIONS:  1. Assess patient/family anxiety and grief process related to end of life issues  2. Provide emotional and spiritual support  3. Provide information about the patient's health status with consideration of family and cultural values  4. Communicate willingness to discuss death and facilitate grief process  with patient/family as appropriate  5. Emphasize sustaining relationships within family system and community, or amy/spiritual traditions  6.  Initiate Spiritual Care, Psychosocial Clinical Specialist, consult as needed  11/14/2022 0603 by Akiko Alfaro RN  Outcome: Progressing  11/13/2022 1653 by Shavon Jiménez RN  Outcome: Progressing  Flowsheets  Taken 11/13/2022 1615  Patient/family communicates acceptance of loss or impending death and feels physical/psychological comfort and peace:   Assess patient/family anxiety and grief process related to end of life issues   Provide emotional and spiritual support   Provide information about the patients health status with consideration of family and cultural values  Taken 11/13/2022 1200  Patient/family communicates acceptance of loss or impending death and feels physical/psychological comfort and peace:   Assess patient/family anxiety and grief process related to end of life issues   Provide emotional and spiritual support   Provide information about the patients health status with consideration of family and cultural values  Taken 11/13/2022 0900  Patient/family communicates acceptance of loss or impending death and feels physical/psychological comfort and peace:   Assess patient/family anxiety and grief process related to end of life issues   Provide emotional and spiritual support   Provide information about the patients health status with consideration of family and cultural values     Problem: Decision Making  Goal: Pt/Family able to effectively weigh alternatives and participate in decision making related to treatment and care  Description: INTERVENTIONS:  1. Determine when there are differences between patient's view, family's view, and healthcare provider's view of condition  2. Facilitate patient and family articulation of goals for care  3. Help patient and family identify pros/cons of alternative solutions  4. Provide information as requested by patient/family  5. Respect patient/family right to receive or not to receive information  6. Serve as a liaison between patient and family and health care team  7.  Initiate Consults from Ethics, Palliative Care or initiate [facility] as is appropriate  11/14/2022 0603 by Patrick Shelton RN  Outcome: Progressing  11/13/2022 1653 by Arash Aviles RN  Outcome: Progressing  Flowsheets  Taken 11/13/2022 1615  Patient/family able to effectively weigh alternatives and participate in decision making related to treatment and care:   Determine when there are differences between patient's view, family's view, and healthcare provider's view of condition   Facilitate patient and family articulation of goals for care  Taken 11/13/2022 1200  Patient/family able to effectively weigh alternatives and participate in decision making related to treatment and care:   Determine when there are differences between patient's view, family's view, and healthcare provider's view of condition   Facilitate patient and family articulation of goals for care   Help patient and family identify pros/cons of alternative solutions  Taken 11/13/2022 0900  Patient/family able to effectively weigh alternatives and participate in decision making related to treatment and care:   Determine when there are differences between patient's view, family's view, and healthcare provider's view of condition   Facilitate patient and family articulation of goals for care     Problem: Infection - Adult  Goal: Absence of infection at discharge  11/14/2022 0603 by Jennifer Reddy RN  Outcome: Progressing  11/13/2022 1653 by Rod Perez RN  Outcome: Progressing  Flowsheets  Taken 11/13/2022 1615  Absence of infection at discharge:   Assess and monitor for signs and symptoms of infection   Monitor lab/diagnostic results   Monitor all insertion sites i.e., indwelling lines, tubes and drains  Taken 11/13/2022 1200  Absence of infection at discharge:   Assess and monitor for signs and symptoms of infection   Monitor lab/diagnostic results   Monitor all insertion sites i.e., indwelling lines, tubes and drains  Taken 11/13/2022 0900  Absence of infection at discharge:   Assess and monitor for signs and symptoms of infection   Monitor lab/diagnostic results   Monitor all insertion sites i.e., indwelling lines, tubes and drains  Goal: Absence of fever/infection during anticipated neutropenic period  11/14/2022 0603 by Jennifer Reddy RN  Outcome: Progressing  11/13/2022 1653 by Rod Perez RN  Outcome: Progressing  Flowsheets  Taken 11/13/2022 1615  Absence of fever/infection during anticipated neutropenic period:   Monitor white blood cell count   Administer growth factors as ordered  Taken 11/13/2022 1200  Absence of fever/infection during anticipated neutropenic period:   Monitor white blood cell count   Administer growth factors as ordered  Taken 11/13/2022 0900  Absence of fever/infection during anticipated neutropenic period:   Monitor white blood cell count   Administer growth factors as ordered     Problem: Skin/Tissue Integrity  Goal: Absence of new skin breakdown  Description: 1. Monitor for areas of redness and/or skin breakdown  2. Assess vascular access sites hourly  3. Every 4-6 hours minimum:  Change oxygen saturation probe site  4. Every 4-6 hours:   If on nasal continuous positive airway pressure, respiratory therapy assess nares and determine need for appliance change or resting period.   11/14/2022 0603 by Jurgen Uribe RN  Outcome: Progressing  11/13/2022 1653 by Frannie Silveira RN  Outcome: Progressing     Problem: ABCDS Injury Assessment  Goal: Absence of physical injury  11/14/2022 0603 by Jurgen Uribe RN  Outcome: Progressing  11/13/2022 1653 by Frannie Silveira RN  Outcome: Progressing     Problem: Nutrition Deficit:  Goal: Optimize nutritional status  11/14/2022 0603 by Jurgen Uribe RN  Outcome: Progressing  11/13/2022 1653 by Frannie Silveira RN  Outcome: Progressing  Flowsheets (Taken 11/13/2022 1406 by Davon Rivers, RD, LD)  Nutrient intake appropriate for improving, restoring, or maintaining nutritional needs:   Monitor oral intake, labs, and treatment plans   Recommend, monitor, and adjust tube feedings and TPN/PPN based on assessed needs

## 2022-11-14 NOTE — PROGRESS NOTES
[facility]    PROGRESS NOTE             11/14/2022    8:08 AM    Name:   Marion Goode  MRN:     [id number]     Acct:      [de-identified]   Room:   2006/2006-01  IP Day:  6  Admit Date:  11/8/2022 11:16 AM    PCP:  Vida Hightower DTR  Code Status:  Full Code    Subjective:     C/C:   Chief Complaint   Patient presents with    Dizziness    Cough    Shortness of Breath     Interval History Status: improved. The patient was seen and examined at the bedside. The patient is refusing to wear BiPAP. Precedex not started. The patient became hypertensive overnight (-210). Cardene drip started. BP now better controlled (160s). Hypokalemia (3.3). Brief History:      The patient is a 61year old non- female with a history of COPD (not on home oxygen), smoking (60 pack-years), hypertension, and hyperlipidemia who presents with shortness of breath worsening over the past week. The patient endorses a cough productive of green phlegm that has also worsened over the past week. She denies headache, dizziness, syncope, fever, chills, abdominal pain, nausea, vomiting, constipation and diarrhea. In the ED, the patient was hypoxic  (Sp02 58% on room air), tachycardic (108), and tachypneic (34). ABGs showed hypercapnia (pC02 58.3) and hypoxia (pO2 63.4). The patient was placed on BiPAP, but did not tolerate it and was placed on high flow nasal cannula. The patient was on 30 L high flow with 50% Fi02 and saturating 92%. The patient was given one dose of Solumedrol 125 mg and one dose of Duoneb nebulizer. The patient was given a bolus of IV normal saline. Respiratory panel was positive for adenovirus. IV ceftriaxone was started. Glucose was 158. Creatinine was 0.71. WBCs were elevated at 18.4. Troponin was elevated at 56; repeat 50.  EKG showed sinus tachycardia with occasional PVCs, possible LA enlargement, right superior axis deviation, and incomplete right bundle branch block, RV hypertrophy, T wave abnormality (possible inferior and anterolateral ischemia). CT chest was negative for pulmonary embolism, but showed multifocal tree-in-bud and nodular opacities throughout both lungs likely representing recurrent aspiration or atypical mycobacterium infection; mild emphysema, bronchiectasis and scarring along medial right upper lobe. The patient was admitted for management of acute on chronic hypoxic, hypercapnic respiratory failure secondary to COPD exacerbation and multifocal pneumonia. Pulmonology and infectious disease were consulted     Review of Systems:     Review of Systems   Constitutional:  Negative for activity change, chills, fatigue and fever. HENT:  Negative for congestion and rhinorrhea. Respiratory:  Positive for cough and shortness of breath. Negative for chest tightness and wheezing. Cardiovascular:  Negative for chest pain, palpitations and leg swelling. Gastrointestinal:  Negative for abdominal distention, abdominal pain, constipation, diarrhea, nausea and vomiting. Endocrine: Negative for cold intolerance and heat intolerance. Genitourinary:  Negative for dysuria, frequency and urgency. Musculoskeletal:  Negative for back pain and myalgias. Skin:  Negative for pallor and rash. Neurological:  Negative for dizziness, tremors, syncope, weakness and headaches. Psychiatric/Behavioral:  Negative for agitation and confusion. Medications:      Allergies:  No Known Allergies    Current Meds:   Scheduled Meds:    methylPREDNISolone  30 mg IntraVENous Q8H    hydrALAZINE  25 mg Oral 3 times per day    hydroCHLOROthiazide  25 mg Oral Daily    insulin lispro  0-8 Units SubCUTAneous TID WC    insulin lispro  0-4 Units SubCUTAneous Nightly    fluticasone  1 spray Each Nostril Daily    lisinopril  40 mg Oral Daily    dilTIAZem  30 mg Oral 4 times per day    ipratropium-albuterol  1 ampule Inhalation Q4H doxycycline monohydrate  100 mg Oral 2 times per day    cefepime  2,000 mg IntraVENous Q8H    sodium chloride flush  5-40 mL IntraVENous 2 times per day    enoxaparin  40 mg SubCUTAneous Daily    atorvastatin  40 mg Oral Daily    montelukast  10 mg Oral Nightly    benzonatate  200 mg Oral TID    dextromethorphan  60 mg Oral 2 times per day    nicotine  1 patch TransDERmal Daily     Continuous Infusions:    dextrose      niCARdipine 5 mg/hr (22 0439)    dexmedetomidine Stopped (22 2387)    sodium chloride       PRN Meds: potassium chloride **OR** potassium alternative oral replacement **OR** potassium chloride, hydrALAZINE, sodium phosphate IVPB **OR** sodium phosphate IVPB **OR** sodium phosphate IVPB, glucose, dextrose bolus **OR** dextrose bolus, glucagon (rDNA), dextrose, perflutren lipid microspheres, sodium chloride flush, sodium chloride flush, sodium chloride, ondansetron **OR** ondansetron, polyethylene glycol, acetaminophen **OR** acetaminophen    Data:     Past Medical History:   has a past medical history of Acute respiratory failure ([facility] [address] COPD (chronic obstructive pulmonary disease) ([facility] [address] Cough, Current every day smoker, Dyspnea, Heart murmur, Hypertension, and Shortness of breath. Social History:   reports that she has been smoking cigarettes. She has been smoking an average of .5 packs per day. She has never used smokeless tobacco. She reports that she does not drink alcohol and does not use drugs. Family History: History reviewed. No pertinent family history. Vitals:  BP (!) 160/67   Pulse 82   Temp 97.4 °F (36.3 °C) (Oral)   Resp 19   Ht 4' 11\" (1.499 m)   Wt 129 lb 3 oz (58.6 kg)   SpO2 94%   BMI 26.09 kg/m²   Temp (24hrs), Av.5 °F (36.4 °C), Min:97.3 °F (36.3 °C), Max:98.1 °F (36.7 °C)    Recent Labs     22  1218 22  1804 22  0707   POCGLU 156* 158* 154* 136*       I/O(24Hr):     Intake/Output Summary (Last 24 hours) at 2022 4493  Last data filed at 11/14/2022 7911  Gross per 24 hour   Intake 1387.31 ml   Output 600 ml   Net 787.31 ml       Labs:  [unfilled]    Lab Results   Component Value Date/Time    SPECIAL NOT REPORTED 10/28/2019 07:58 PM     Lab Results   Component Value Date/Time    CULTURE NORMAL RESPIRATORY DAVID MODERATE GROWTH 11/08/2022 10:25 PM       [unfilled]    Radiology:    XR CHEST PORTABLE    Result Date: 11/12/2022  EXAMINATION: ONE XRAY VIEW OF THE CHEST 11/12/2022 6:24 am COMPARISON: Chest radiograph performed 11/10/2022. HISTORY: ORDERING SYSTEM PROVIDED HISTORY: Adenovirus pneumonia, severe COPD TECHNOLOGIST PROVIDED HISTORY: Adenovirus pneumonia, severe COPD Reason for Exam: adenovirus pneumonia, severe COPD FINDINGS: There is no acute consolidation or effusion. There is no pneumothorax. The mediastinal structures are unremarkable. The upper abdomen is unremarkable. The extrathoracic soft tissues are unremarkable. There is a right-sided PICC line with the tip in the mid SVC. No acute cardiopulmonary process. Right-sided PICC line with the tip in the mid SVC. XR CHEST PORTABLE    Result Date: 11/10/2022  EXAMINATION: ONE XRAY VIEW OF THE CHEST 11/10/2022 7:46 am COMPARISON: 11/08/2022, 10/29/2019 HISTORY: ORDERING SYSTEM PROVIDED HISTORY: SOB TECHNOLOGIST PROVIDED HISTORY: SOB Reason for Exam: dyspnea FINDINGS: Interstitial opacities with mild septal thickening has increased in the interval.  Perihilar opacities are noted as well. No pneumothorax or significant effusion appreciated. Cardiac and mediastinal contours appear unchanged. Increasing interstitial opacities, which may represent developing edema versus inflammatory process or atypical infection.      XR CHEST PORTABLE    Result Date: 11/8/2022  EXAMINATION: ONE XRAY VIEW OF THE CHEST 11/8/2022 8:37 am COMPARISON: 10/19/2019 HISTORY: ORDERING SYSTEM PROVIDED HISTORY: sob TECHNOLOGIST PROVIDED HISTORY: sob Reason for Exam: sob FINDINGS: Cardial pericardial silhouette is prominent but stable. Increased interstitial opacities noted bilaterally. Focal infiltrate is not identified. No pneumothorax. No free air. No acute bony abnormality. Increased interstitial opacity. While much or all of this may be chronic, please correlate with any clinical evidence of acute interstitial edema. CT CHEST PULMONARY EMBOLISM W CONTRAST    Result Date: 11/8/2022  EXAMINATION: CTA OF THE CHEST 11/8/2022 12:44 pm TECHNIQUE: CTA of the chest was performed after the administration of intravenous contrast.  Multiplanar reformatted images are provided for review. MIP images are provided for review. Automated exposure control, iterative reconstruction, and/or weight based adjustment of the mA/kV was utilized to reduce the radiation dose to as low as reasonably achievable. COMPARISON: 10/31/2019 HISTORY: ORDERING SYSTEM PROVIDED HISTORY: odette tachycardia TECHNOLOGIST PROVIDED HISTORY: odette tachycardia Decision Support Exception - unselect if not a suspected or confirmed emergency medical condition->Emergency Medical Condition (MA) Reason for Exam: sob, COPD 70-year-old female with shortness of breath, COPD, tachycardia FINDINGS: Pulmonary Arteries: No obvious filling defect in the pulmonary arterial vasculature that meets the criteria for pulmonary embolus within the limitations of this study. Evaluation of the pulmonary arterial vasculature is limited due to streak artifact from the contrast bolus in the SVC, suboptimal bolus timing, and respiratory motion. Mediastinum: Visualized thyroid gland grossly unremarkable in appearance. Atheromatous plaque and atherosclerotic calcification of the aorta. Coronary artery disease. Bilateral hilar lymph node enlargement. No axillary lymphadenopathy. No mediastinal lymphadenopathy. No dissection flap within the visualized thoracic aorta. No pericardial or pleural effusions. No periaortic or mediastinal hemorrhage. Lungs/pleura: Trachea and proximal central airways appear patent. Respiratory motion throughout both lungs. Mild emphysema. Bronchiectasis and scarring along the medial right upper lobe. Tree-in-bud and nodular opacities throughout the bilateral upper lobes. Similar findings are seen within the right middle lobe, lingula, and superior segments of the lower lobes as well as the anteroinferior lower lobes. Upper Abdomen: Fatty liver. Atheromatous plaque and atherosclerotic calcification of the upper abdominal aorta and branch vasculature. Evaluation of the upper abdomen is limited due to respiratory motion. Soft Tissues/Bones: Mild diffuse degenerative changes throughout the spine. 1. No clear evidence for central pulmonary embolus within the limitations of this study. 2. Multifocal tree-in-bud and nodular opacities throughout both lungs as detailed above likely representing sequela of recurrent aspiration or atypical mycobacterial infection. Follow-up is recommended to document resolution. 3. Mild emphysema. Respiratory motion. Bronchiectasis and scarring along the medial right upper lobe. 4. Atheromatous plaque and atherosclerotic calcification of the aorta. Coronary artery disease. 5. Bilateral hilar lymph node enlargement, likely reactive. 6. Fatty liver.      VL Upper Extremity Venous Duplex Left    Result Date: 11/11/2022    [address] [place] [address]  Vascular Upper Extremities Veins Procedure   Patient Name   Denia DAUGHERTY  Date of Study           11/10/2022   Date of Birth  1965  Gender                  Female   Age            61 year(s)  Race                       Room Number    2006        Height:                 59 inch, 149.86 cm   Corporate ID # [id number]    Weight:                 128 pounds, 58.1 kg   Patient Acct # [de-identified]   BSA:        1.53 m^2    BMI:       25.85 kg/m^2   MR #           [id number]      Sonographer             Jenniffer Garnados, T   Accession #    [id number] Interpreting Physician  [id number]   Referring                  Referring Physician     Danielle Gifford  Nurse  Practitioner  Procedure Type of Study:   Veins: Upper Extremities Veins, Venous Scan Upper Left. Indications for Study:R/O DVT. Patient Status: In Patient. Technical Quality:Adequate visualization. Conclusions   Summary   No evidence of superficial or deep venous thrombosis in the left upper  extremity. Signature   ----------------------------------------------------------------  Electronically signed by Saroj Welsh RVT(Sonographer) on  11/10/2022 12:49 PM  ----------------------------------------------------------------   ----------------------------------------------------------------  Electronically signed by Neil Reiter Reyes,Arthur(Interpreting  physician) on 11/11/2022 02:42 PM  ----------------------------------------------------------------  Findings:   Right Impression:          Left Impression:  Right subclavian vein is   Left internal jugular, subclavian, axillary,  compressible with normal   brachial, ulnar, radial, cephalic and basilic  doppler responses. veins are compressible with normal doppler                             responses. Velocities are measured in cm/s ; Diameters are measured in cm Right UE Vein Measurements 2D Measurements +---------------+-----------------+----------------------+-----------------+ ! Location       ! Visualized       ! Compressibility       ! Thrombosis       ! +---------------+-----------------+----------------------+-----------------+ ! Dist SCV       ! Yes              ! Yes                   ! None             ! +---------------+-----------------+----------------------+-----------------+ Left UE Vein Measurements 2D Measurements +------------------------------------+----------+---------------+----------+ ! Location                            ! Visualized! Compressibility! Thrombosis! +------------------------------------+----------+---------------+----------+ ! Prox IJV                            ! Yes       ! Yes            ! None      ! +------------------------------------+----------+---------------+----------+ ! Dist IJV                            ! Yes       ! Yes            ! None      ! +------------------------------------+----------+---------------+----------+ ! Prox SCV                            ! Yes       ! Yes            ! None      ! +------------------------------------+----------+---------------+----------+ ! Dist SCV                            ! Yes       ! Yes            ! None      ! +------------------------------------+----------+---------------+----------+ ! Prox Axillary                       ! Yes       ! Yes            ! None      ! +------------------------------------+----------+---------------+----------+ ! Dist Axillary                       ! Yes       ! Yes            ! None      ! +------------------------------------+----------+---------------+----------+ ! Prox Brachial                       !Yes       ! Yes            ! None      ! +------------------------------------+----------+---------------+----------+ ! Dist Brachial                       !Yes       ! Yes            ! None      ! +------------------------------------+----------+---------------+----------+ ! Prox Radial                         !Yes       ! Yes            ! None      ! +------------------------------------+----------+---------------+----------+ ! Dist Radial                         !Yes       ! Yes            ! None      ! +------------------------------------+----------+---------------+----------+ ! Prox Ulnar                          ! Yes       ! Yes            ! None      ! +------------------------------------+----------+---------------+----------+ ! Dist Ulnar                          ! Yes       ! Yes            ! None      ! +------------------------------------+----------+---------------+----------+ ! Shelley at UA !Yes       !Yes            ! None      ! +------------------------------------+----------+---------------+----------+ ! Basilic at AF                       ! Yes       ! Yes            ! None      ! +------------------------------------+----------+---------------+----------+ ! Basilic at [address]                       ! Yes       ! Yes            ! None      ! +------------------------------------+----------+---------------+----------+ ! Cephalic at UA                      ! Yes       ! Yes            ! None      ! +------------------------------------+----------+---------------+----------+ ! Cephalic at AF                      ! Yes       ! Yes            ! None      ! +------------------------------------+----------+---------------+----------+ ! Cephalic at [address]                      ! Yes       ! Yes            ! None      ! +------------------------------------+----------+---------------+----------+        Physical Examination:        Physical Exam  Constitutional:       General: She is not in acute distress. Appearance: Normal appearance. HENT:      Head: Normocephalic and atraumatic. Nose: No congestion or rhinorrhea. Eyes:      Extraocular Movements: Extraocular movements intact. Conjunctiva/sclera: Conjunctivae normal.      Pupils: Pupils are equal, round, and reactive to light. Cardiovascular:      Rate and Rhythm: Normal rate and regular rhythm. Pulses: Normal pulses. Heart sounds: Normal heart sounds. No murmur heard. No friction rub. No gallop. Pulmonary:      Effort: Pulmonary effort is normal. No respiratory distress. Breath sounds: Wheezing and rhonchi present. No rales. Abdominal:      General: Abdomen is flat. Bowel sounds are normal. There is no distension. Tenderness: There is no abdominal tenderness. There is no guarding. Skin:     Capillary Refill: Capillary refill takes less than 2 seconds. Coloration: Skin is not jaundiced or pale.    Neurological: General: No focal deficit present. Mental Status: She is alert and oriented to person, place, and time. Sensory: No sensory deficit. Motor: No weakness. Psychiatric:         Mood and Affect: Mood normal.         Assessment:        Primary Problem  Acute respiratory failure [facility])    Active Hospital Problems    Diagnosis Date Noted    Acute respiratory failure ([facility] [address] [J96.00] 11/08/2022     Priority: Medium       Plan:         Acute on chronic hypoxic, hypercapnic respiratory failure 2/2 COPD exacerbation and multifocal pneumonia r/o TB  -ABGs 11/10: pH 7.325, pC02 81.2, p02 70.6; HC03 42.3  -Patient saturating 94% on 5 L. The patient will receive BiPap when sleeping (however she has been refusing due to anxiety)  -CXR: increased interstitial opacity (edema vs. Atypical resp. Infection)  -Repeat CXR 11/12: no acute cardiopulmonary process  -CT PE: multifocal tree-in-bud and nodular opacities throughout both lungs likely representing recurrent aspiration or atypical mycobacterium infection; mild emphysema, bronchiectasis and scarring along medial right upper lobe; Lt. subclavian artery stenosis vs. occlusion  -WBCs on admission 18.4; today: 21.1  -Respiratory viral panel: positive for adenovirus  -Respiratory culture normal valdez  -Legionella negative, S.  Pneumoniae negative, Mycoplasma negative  -QuantiFERON gold test: negative   -Pro-BNP: 1,874  -Echo showed EF of about 55%  -Pro-calcitonin 0.10  -Lactic acid 0.8  -Continue Cefepime 2000 mg IV q 8 for 7 days (last dose 11/17)  -Continue Doxycycline 100 mg po q 12 hours for 7 days (last dose 11/17)  -Continue DuoNeb aerols  -Continue anti-tussives: Benzozonate, Delsym   -Continue Singulair 10 mg po nightly   -Continue Flonase   -Pulmonology consulted (decrease Solu-Medrol to 30 q 8 hours)  -ID consulted (continue Cefepime and Doxycycline)        Hypokalemia   -K+ today 3.3  -Mg 1.9  -Start 40 mEq potassium chloride po once   -K+ replacement protocol in place, currently replacing   -Continue to monitor      Hyperlipidemia  -Continue Lipitor 40 mg po daily      Hypertension  -Hypertensive urgency overnight (systolic > 718); now resolved   -Continue Hydralazine 10 mg IV q 6 hours PRN  -Continue Lisinopril 40 mg po daily (home med)  -Continue Diltiazem 30 mg po q 6 hours   -Start HCTZ 25 mg po daily   -Continue Lopressor 5 mg IV q 6 hours PRN   -Cardene drip started     Contact and droplet isolation   DVT prophylaxis: Lovenox 40 mg SC daily   Code: Full code  Diet: Regular adult diet ; TPN not given today   PT/OT/SW consulted       Mary Delaney MD  11/14/2022  8:08 AM        I have discussed the care of Jair Mojica , including pertinent history and exam findings,    today with the resident. I have seen and examined the patient and the key elements of all parts of the encounter have been performed by me . I agree with the assessment, plan and orders as documented by the resident. Principal Problem:    Acute respiratory failure ([address]  Resolved Problems:    * No resolved hospital problems. *        Overall  course ;                                   are improving over time.         Patient, clinically doing better  Did not use her BiPAP last night  On 5 L of oxygen  Cardene drip is off  Adding hydralazine for blood pressure control  Can shift out of ICU once okay with pulm          Electronically signed by Kwasi Choi MD

## 2022-11-14 NOTE — PROGRESS NOTES
[facility] -   Infectious diseases evaluation  admission date 11/8/2022    reason for consultation:   Atypical pneumonia    Impression :   Current:  Adenovirus respiratory infection with possible superimposed bacterial infection. Acute COPD exacerbation  Hyponatremia  Chronic hypercapnic respiratory failure  History of smoking    Recommendations   QuantiFERON-TB test was negative  IV cefepime and doxycycline day 5  Nasal swab for MRSA was negative  Procalcitonin level was 0.1 and lactic acid was 0.8 on 11/10/2022  Mycoplasma IgM negative  Legionella urine antigen negative  Normal valdez growth on respiratory culture  HIV screen negative  Respiratory panel was positive for adenovirus PCR  Continue supportive care    Infection Control Recommendations   [place] Precautions  Droplet Isolation      Antimicrobial Stewardship Recommendations   Simplification of therapy  Targeted therapy      History of Present Illness:   Initial history:  Bambi Cuba is a 61y.o.-year-old female with history of smoking, COPD, chronic hypercapnic respiratory failure, noncompliant with home medication. She presented to hospital with worsening shortness of breath associated with cough productive of green phlegm for several days, symptoms moderate to severe. CT chest was negative for pulmonary embolism but showed multiple reticular nodule infiltrates in the upper lobes right more than left. Respiratory panel was positive for adenovirus PCR. The patient denied sick contact, no recent travel, denied hemoptysis, no chronic fatigue or night sweats. She is actively smoking, denied alcohol or drug abuse. Interval changes  11/14/2022   She is improving, comfortable on oxygen per nasal cannula, less cough and shortness of breath, afebrile, no new events.     Patient Vitals for the past 8 hrs:   BP Temp Temp src Pulse Resp SpO2   11/14/22 1514 -- -- -- 78 11 92 %   11/14/22 1421 (!) 146/76 -- -- -- -- --   11/14/22 1400 (!) 146/76 -- -- 75 28 90 %   11/14/22 1300 (!) 158/71 -- -- 82 20 93 %   11/14/22 1200 (!) 163/77 98.1 °F (36.7 °C) Axillary 80 24 92 %   11/14/22 1113 -- -- -- 96 20 (!) 84 %   11/14/22 1100 (!) 160/56 -- -- 80 17 94 %   11/14/22 1033 (!) 177/99 -- -- 86 30 94 %   11/14/22 1003 (!) 143/87 -- -- 77 24 93 %   11/14/22 0855 (!) 160/63 -- -- 83 25 (!) 89 %   11/14/22 0845 (!) 124/103 -- -- 85 24 (!) 87 %               I have personally reviewed the past medical history, past surgical history, medications, social history, and family history, and I haveupdated the database accordingly. Allergies:   Patient has no known allergies. Review of Systems:     Review of Systems  As per history of present illness, other than above 12 system review was negative  Physical Examination :       Physical Exam  HENT:      Right Ear: External ear normal.      Left Ear: External ear normal.      Mouth/Throat:      Pharynx: No oropharyngeal exudate. Eyes:      General: No scleral icterus. Conjunctiva/sclera: Conjunctivae normal.   Cardiovascular:      Rate and Rhythm: Normal rate and regular rhythm. Pulmonary:      Effort: Pulmonary effort is normal.      Breath sounds: No wheezing or rhonchi. Abdominal:      General: There is no distension. Palpations: Abdomen is soft. Tenderness: There is no abdominal tenderness. Musculoskeletal:      Cervical back: Neck supple. No rigidity. Right lower leg: No edema. Left lower leg: No edema. Skin:     General: Skin is warm. Coloration: Skin is not jaundiced. Neurological:      Mental Status: She is alert.        Past Medical History:     Past Medical History:   Diagnosis Date    Acute respiratory failure ([address]     due to COPD exacerbation    COPD (chronic obstructive pulmonary disease) [address] [address]     with hypoxia    Cough     Current every day smoker     Dyspnea     Heart murmur     as a child    Hypertension     Shortness of breath Past Surgical  History:     Past Surgical History:   Procedure Laterality Date     SECTION         Medications:      methylPREDNISolone  30 mg IntraVENous Q8H    hydrALAZINE  25 mg Oral 3 times per day    hydroCHLOROthiazide  25 mg Oral Daily    insulin lispro  0-8 Units SubCUTAneous TID WC    insulin lispro  0-4 Units SubCUTAneous Nightly    fluticasone  1 spray Each Nostril Daily    lisinopril  40 mg Oral Daily    dilTIAZem  30 mg Oral 4 times per day    ipratropium-albuterol  1 ampule Inhalation Q4H    doxycycline monohydrate  100 mg Oral 2 times per day    cefepime  2,000 mg IntraVENous Q8H    sodium chloride flush  5-40 mL IntraVENous 2 times per day    enoxaparin  40 mg SubCUTAneous Daily    atorvastatin  40 mg Oral Daily    montelukast  10 mg Oral Nightly    benzonatate  200 mg Oral TID    dextromethorphan  60 mg Oral 2 times per day    nicotine  1 patch TransDERmal Daily       Social History:     Social History     Socioeconomic History    Marital status: Legally      Spouse name: Not on file    Number of children: Not on file    Years of education: Not on file    Highest education level: Not on file   Occupational History    Not on file   Tobacco Use    Smoking status: Every Day     Packs/day: 0.50     Types: Cigarettes    Smokeless tobacco: Never   Vaping Use    Vaping Use: Some days   Substance and Sexual Activity    Alcohol use: No    Drug use: No    Sexual activity: Not on file   Other Topics Concern    Not on file   Social History Narrative    Not on file     Social Determinants of Health     Financial Resource Strain: Not on file   Food Insecurity: Not on file   Transportation Needs: Not on file   Physical Activity: Not on file   Stress: Not on file   Social Connections: Not on file   Intimate Partner Violence: Not on file   Housing Stability: Not on file       Family History:   History reviewed. No pertinent family history.    Medical Decision Making:   I have independently reviewed/ordered the following labs:    CBC with Differential:   Recent Labs     11/13/22  0402 11/14/22 0440   WBC 17.6* 21.1*   HGB 14.5 15.0   HCT 45.4 45.2    269       BMP:  Recent Labs     11/13/22  0402 11/14/22  0440   * 132*   K 3.4* 3.3*   CL 93* 92*   CO2 35* 34*   BUN 20 15   CREATININE <0.40* 0.40*   MG 2.1 1.9       Hepatic Function Panel:   Recent Labs     11/12/22  0417   PROT 6.5   LABALBU 3.3*   BILITOT 0.3   ALKPHOS 49   ALT 11   AST 13       No results for input(s): RPR in the last 72 hours. No results for input(s): HIV in the last 72 hours. No results for input(s): BC in the last 72 hours. Lab Results   Component Value Date/Time    CREATININE 0.40 11/14/2022 04:40 AM    GLUCOSE 143 11/14/2022 04:40 AM       Detailed results: Thank you for allowing us to participate in the care of this patient. Please call with questions. This note is created with the assistance of a speech recognition program.  While intending to generate adocument that actually reflects the content of the visit, the document can still have some errors including those of syntax and sound a like substitutions which may escape proof reading. It such instances, actual meaningcan be extrapolated by contextual diversion.     Aliza Dean MD  Office: [phone]  Perfect serve / office [phone]

## 2022-11-14 NOTE — PLAN OF CARE
Problem: Discharge Planning  Goal: Discharge to home or other facility with appropriate resources  11/14/2022 1746 by Hilario Herndon RN  Outcome: Progressing  Flowsheets (Taken 11/14/2022 0800)  Discharge to home or other facility with appropriate resources: Refer to discharge planning if patient needs post-hospital services based on physician order or complex needs related to functional status, cognitive ability or social support system  11/14/2022 0603 by Kassidy Gonzalez RN  Outcome: Progressing     Problem: Safety - Adult  Goal: Free from fall injury  11/14/2022 1746 by Hilario Herndon RN  Outcome: Progressing  Flowsheets (Taken 11/14/2022 0800)  Free From Fall Injury: Instruct family/caregiver on patient safety  11/14/2022 0603 by Kassidy Gonzalez RN  Outcome: Progressing     Problem: Respiratory - Adult  Goal: Achieves optimal ventilation and oxygenation  11/14/2022 1746 by Hilario Herndon RN  Outcome: Progressing  Flowsheets (Taken 11/14/2022 0800)  Achieves optimal ventilation and oxygenation:   Assess for changes in respiratory status   Assess for changes in mentation and behavior   Position to facilitate oxygenation and minimize respiratory effort   Assess and instruct to report shortness of breath or any respiratory difficulty   Respiratory therapy support as indicated  11/14/2022 0603 by Kassidy Gonzalez RN  Outcome: Progressing     Problem: Pain  Goal: Verbalizes/displays adequate comfort level or baseline comfort level  11/14/2022 1746 by Hilario Herndon RN  Outcome: Progressing  Flowsheets (Taken 11/14/2022 0800)  Verbalizes/displays adequate comfort level or baseline comfort level:   Encourage patient to monitor pain and request assistance   Assess pain using appropriate pain scale  11/14/2022 0603 by Kassidy Gonzalez RN  Outcome: Progressing     Problem: Cardiovascular - Adult  Goal: Maintains optimal cardiac output and hemodynamic stability  11/14/2022 1746 by Alejandra Collier RN  Outcome: Progressing  Flowsheets (Taken 11/14/2022 0800)  Maintains optimal cardiac output and hemodynamic stability:   Monitor blood pressure and heart rate   Assess for signs of decreased cardiac output  11/14/2022 0603 by Akiko Alfaro RN  Outcome: Progressing  Goal: Absence of cardiac dysrhythmias or at baseline  11/14/2022 1746 by Alejandra Collier RN  Outcome: Progressing  Flowsheets (Taken 11/14/2022 0800)  Absence of cardiac dysrhythmias or at baseline:   Monitor cardiac rate and rhythm   Assess for signs of decreased cardiac output   Administer antiarrhythmia medication and electrolyte replacement as ordered  11/14/2022 0603 by Akiko Alfaro RN  Outcome: Progressing     Problem: Skin/Tissue Integrity - Adult  Goal: Skin integrity remains intact  11/14/2022 1746 by Alejandra Collier RN  Outcome: Progressing  Flowsheets  Taken 11/14/2022 1600  Skin Integrity Remains Intact: Monitor for areas of redness and/or skin breakdown  Taken 11/14/2022 0800  Skin Integrity Remains Intact:   Monitor for areas of redness and/or skin breakdown   Assess vascular access sites hourly   Every 4-6 hours minimum: Change oxygen saturation probe site   Every 4-6 hours: If on nasal continuous positive airway pressure, respiratory therapy assesses nares and determine need for appliance change or resting period  11/14/2022 0603 by Akiko Alfaro RN  Outcome: Progressing  Goal: Oral mucous membranes remain intact  11/14/2022 1746 by Alejandra Collier RN  Outcome: Progressing  Flowsheets  Taken 11/14/2022 1600  Oral Mucous Membranes Remain Intact: Implement preventative oral hygiene regimen  Taken 11/14/2022 0800  Oral Mucous Membranes Remain Intact:   Assess oral mucosa and hygiene practices   Implement preventative oral hygiene regimen   Implement oral medicated treatments as ordered  11/14/2022 0603 by Akiko Alfaro RN  Outcome: Progressing     Problem: Infection - Adult  Goal: Absence of infection at discharge  11/14/2022 1746 by Jorge L Cervantes RN  Outcome: Progressing  Flowsheets (Taken 11/14/2022 0800)  Absence of infection at discharge:   Assess and monitor for signs and symptoms of infection   Monitor lab/diagnostic results   Administer medications as ordered   Instruct and encourage patient and family to use good hand hygiene technique  11/14/2022 0603 by Lizeth Betancourt RN  Outcome: Progressing  Goal: Absence of fever/infection during anticipated neutropenic period  11/14/2022 1746 by Jorge L Cervantes RN  Outcome: Progressing  Flowsheets (Taken 11/14/2022 0800)  Absence of fever/infection during anticipated neutropenic period:   Monitor white blood cell count   Administer growth factors as ordered   Implement neutropenic guidelines  11/14/2022 0603 by Lizeth Betancourt RN  Outcome: Progressing     Problem: Death & Dying  Goal: Pt/Family communicate acceptance of impending death and feel psychological comfort and peace  Description: INTERVENTIONS:  1. Assess patient/family anxiety and grief process related to end of life issues  2. Provide emotional and spiritual support  3. Provide information about the patient's health status with consideration of family and cultural values  4. Communicate willingness to discuss death and facilitate grief process  with patient/family as appropriate  5. Emphasize sustaining relationships within family system and community, or amy/spiritual traditions  6.  Initiate Spiritual Care, Psychosocial Clinical Specialist, consult as needed  11/14/2022 1746 by Jorge L Cervantes RN  Outcome: Progressing  Flowsheets (Taken 11/14/2022 0800)  Patient/family communicates acceptance of loss or impending death and feels physical/psychological comfort and peace: Provide emotional and spiritual support  11/14/2022 0603 by Lizeth Betancourt RN  Outcome: Progressing     Problem: Skin/Tissue Integrity  Goal: Absence of new skin breakdown  Description: 1. Monitor for areas of redness and/or skin breakdown  2. Assess vascular access sites hourly  3. Every 4-6 hours minimum:  Change oxygen saturation probe site  4. Every 4-6 hours:  If on nasal continuous positive airway pressure, respiratory therapy assess nares and determine need for appliance change or resting period. 11/14/2022 1746 by Dilma Zaman RN  Outcome: Progressing  11/14/2022 0603 by Elpidio Kent RN  Outcome: Progressing     Problem: Decision Making  Goal: Pt/Family able to effectively weigh alternatives and participate in decision making related to treatment and care  Description: INTERVENTIONS:  1. Determine when there are differences between patient's view, family's view, and healthcare provider's view of condition  2. Facilitate patient and family articulation of goals for care  3. Help patient and family identify pros/cons of alternative solutions  4. Provide information as requested by patient/family  5. Respect patient/family right to receive or not to receive information  6. Serve as a liaison between patient and family and health care team  7. Initiate Consults from Ethics, Palliative Care or initiate [facility] as is appropriate  11/14/2022 1746 by Dilma Zaman RN  Outcome: Progressing  Flowsheets (Taken 11/14/2022 0800)  Patient/family able to effectively weigh alternatives and participate in decision making related to treatment and care:    Facilitate patient and family articulation of goals for care   Help patient and family identify pros/cons of alternative solutions  11/14/2022 0603 by Elpidio Kent RN  Outcome: Progressing     Problem: Nutrition Deficit:  Goal: Optimize nutritional status  11/14/2022 1746 by Dilma Zaman RN  Outcome: Progressing  11/14/2022 0603 by Elpidio Kent RN  Outcome: Progressing     Problem: ABCDS Injury Assessment  Goal: Absence of physical injury  11/14/2022 1746 by Dilma Zaman RN  Outcome: Progressing  Flowsheets (Taken 11/14/2022 0800)  Absence of Physical Injury: Implement safety measures based on patient assessment  11/14/2022 0603 by Claudia Browning RN  Outcome: Progressing

## 2022-11-14 NOTE — PROGRESS NOTES
Patient continues to refuse bipap at this time. RN educated patient on benefits of wearing bipap, and how bipap works and assists patient. RN stated we could try the precedex gtt to help with anxiety. RN educated patient on how precedex works and will help patient feel calm. Patient stating she does not want the bipap and does not want precedex.

## 2022-11-14 NOTE — CARE COORDINATION
ONGOING DISCHARGE PLAN:    Patient is alert and oriented x4. Spoke with patient regarding discharge plan and patient confirms that plan is still with no VNS.    +Adenovirus    Needs new nebulizer for discharge along with Home oxygen eval.    Possible transfer to PCU today. Remains on IV cefepime, IV solu medrol 30mg Q8hrs      Will continue to follow for additional discharge needs.     Electronically signed by Rolo Person RN on 11/14/2022 at 5:15 PM

## 2022-11-14 NOTE — PROGRESS NOTES
Patient refusing BiPAP at this time, patient asking RN to come back in an hour and she will wear it then.

## 2022-11-14 NOTE — PROGRESS NOTES
[address]   INPATIENT OCCUPATIONAL THERAPY  PROGRESS NOTE  Date: 2022  Patient Name: Jaclyn Molina       Room:   MRN: [id number]    : 1965  (61 y.o.)  Gender: female   Referring Practitioner: Alejandro Boyle MD  Diagnosis: Acute respiratory failure    Restrictions/Precautions  Restrictions/Precautions  Restrictions/Precautions: Isolation; Fall Risk;Contact Precautions; Up as Tolerated;General Precautions (Contact and Droplet isolation - adenovirus pneumonia)  Required Braces or Orthoses?: No  Implants present? :  (Pt denies)    Vitals  BP Location: Right lower arm  O2 Device: Nasal cannula  Comment: O2 sats ranges 88-91% on 4L during AROM while sitting EOB and after toilet>bed transfer. Subjective  Subjective  Subjective: \"I'm feeling better today compared to the last few days. \" \"I'm done with smoking\"  Subjective  Pain: Pt denies pain  Comments: Ok per [facility] for therapy. Objective  Orientation  Overall Orientation Status: Within Functional Limits  Cognition  Overall Cognitive Status: WNL  ADL  Feeding: Setup  Grooming: Setup  UE Bathing: Stand by assistance  LE Bathing: Contact guard assistance  UE Dressing: Stand by assistance  LE Dressing: Contact guard assistance  Toileting: Stand by assistance  Toileting Skilled Clinical Factors: pt completes pericare in stand with 1 UE support on toilet rail. c SBA for safety while standing  Additional Comments: ADL scores based on clinical reasoning and skilled observation unless otherwise noted. Pt currently limited due to decreased strength, balance, and activity tolerance impacting safety and independence with self care tasks. IADL Comments: Pt reports sleeping in flat bed  Additional Comments: Pt reports that daughter lives close by and able to assist as needed. Daughter is not working.  Pt reports that boyfriend works during the week and is home during the weekend    Balance  Balance  Sitting Balance: Stand by assistance  Standing Balance: Contact guard assistance (1 LOB)  Standing Balance  Time: ~1 minute x 2 with no device  Activity: bed<>toilet  Comment: pt requires assist with multiple line mgt during transfer    Transfers/Mobility  Bed mobility  Rolling to Left: Stand by assistance  Supine to Sit: Stand by assistance  Sit to Supine: Stand by assistance  Scooting: Stand by assistance  Bed Mobility Comments: Pt seated up in bed upon arrival and seated EOB upon exit  Transfers  Sit to stand: Stand by assistance  Stand to sit: Stand by assistance  Toilet Transfers  Toilet - Technique: Ambulating  Equipment Used: Standard toilet  Toilet Transfer: Stand by assistance    Functional Mobility  Functional - Mobility Device: No device  Activity: Other; To/from bathroom (bed <>toilet within room)  Assist Level: Stand by assistance  Functional Mobility Comments: VCs for safety. ie slow pace and line awareness. pt requires assist with multiple line mgt during transfer    Functional Activities  OT Exercises  A/AROM Exercises: BUE, AROM X 10 repsto support transfers and increase endurance during functional tasks.  ie shoulder flexion, horizontal ab/adduction, shoulder ad/abduction, elbow flexion      Patient Education  Patient Education  Education Given To: Patient  Education Provided: Role of Therapy, Plan of Care, Transfer Training  Education Method: Verbal  Barriers to Learning: None  Education Outcome: Verbalized understanding      Goals  Short Term Goals  Time Frame for Short Term Goals: by discharge  Short Term Goal 1: Pt will complete BADLs with modified independence and Good safety while maintaining Spo2 above 90%  Short Term Goal 2: Pt will complete functional transfers/mobility during self care tasks with mod I and Good safety while maintaining SpO2 above 90%  Short Term Goal 3: Pt will tolerate standing 7+ minutes during functional activity of choice with Good safety while maintaining Spo2 above 90%  Short Term Goal 4: Pt will verbalize/demonstrate Good understanding of home safety/fall prevention/energy conservation strategies to increase safety and independence with self care and mobility  Short Term Goal 5: Pt will participate in 15+ minutes of therapeutic exercises/functional activities to increase safety and independence with self care and mobility  Occupational Therapy Plan  Times Per Week: 3-5  Current Treatment Recommendations: Self-Care / ADL, Strengthening, Balance training, Functional mobility training, Endurance training, Safety education & training, Patient/Caregiver education & training, Equipment evaluation, education, & procurement, Home management training      Assessment  Activity Tolerance  Activity Tolerance: Patient Tolerated treatment well, Patient limited by fatigue  Assessment  Performance deficits / Impairments: Decreased ADL status, Decreased functional mobility , Decreased endurance, Decreased balance, Decreased high-level IADLs  Treatment Diagnosis: Impaired self care status  Prognosis: Good  Decision Making: Medium Complexity  Discharge Recommendations: Patient would benefit from continued therapy after discharge  OT Equipment Recommendations  Other: TBD  Safety Devices  Type of Devices: Call light within reach, Gait belt, Patient at risk for falls, Left in bed, Nurse notified      AM-PAC Daily Activities Inpatient  AM-PAC Daily Activity Inpatient   How much help for putting on and taking off regular lower body clothing?: A Little  How much help for Bathing?: A Little  How much help for Toileting?: A Little  How much help for putting on and taking off regular upper body clothing?: A Little  How much help for taking care of personal grooming?: A Little  How much help for eating meals?: A Little  AM-PAC Inpatient Daily Activity Raw Score: 18  AM-PAC Inpatient ADL T-Scale Score : 38.66  ADL Inpatient CMS 0-100% Score: 46.65  ADL Inpatient CMS G-Code Modifier : CK    OT Minutes  OT Individual Minutes  Time In: 4663  Time Out: [address]  Minutes: [address] [facility], [facility]

## 2022-11-14 NOTE — PROGRESS NOTES
RN notified John Rain NP of patient being hypertensive through out shift, PRN lopressor and hydralazine are not due to be given. Patient sbp ranging 190-210. Kacie WATERMAN advised that ordered cardene gtt be started.

## 2022-11-15 LAB
ANION GAP SERPL CALCULATED.3IONS-SCNC: 6 MMOL/L (ref 9–17)
BUN BLDV-MCNC: 14 MG/DL (ref 6–20)
CALCIUM SERPL-MCNC: 9.1 MG/DL (ref 8.6–10.4)
CHLORIDE BLD-SCNC: 92 MMOL/L (ref 98–107)
CO2: 35 MMOL/L (ref 20–31)
CREAT SERPL-MCNC: <0.4 MG/DL (ref 0.5–0.9)
GFR SERPL CREATININE-BSD FRML MDRD: ABNORMAL ML/MIN/1.73M2
GLUCOSE BLD-MCNC: 128 MG/DL (ref 65–105)
GLUCOSE BLD-MCNC: 131 MG/DL (ref 65–105)
GLUCOSE BLD-MCNC: 135 MG/DL (ref 70–99)
GLUCOSE BLD-MCNC: 146 MG/DL (ref 65–105)
HCT VFR BLD CALC: 46 % (ref 36–46)
HEMOGLOBIN: 15.3 G/DL (ref 12–16)
MAGNESIUM: 2.2 MG/DL (ref 1.6–2.6)
MCH RBC QN AUTO: 29.8 PG (ref 26–34)
MCHC RBC AUTO-ENTMCNC: 33.2 G/DL (ref 31–37)
MCV RBC AUTO: 89.9 FL (ref 80–100)
PDW BLD-RTO: 13.6 % (ref 11.5–14.9)
PHOSPHORUS: 3.6 MG/DL (ref 2.6–4.5)
PLATELET # BLD: 227 K/UL (ref 150–450)
PMV BLD AUTO: 8 FL (ref 6–12)
POTASSIUM SERPL-SCNC: 3.3 MMOL/L (ref 3.7–5.3)
PROCALCITONIN: 0.03 NG/ML
RBC # BLD: 5.11 M/UL (ref 4–5.2)
SODIUM BLD-SCNC: 133 MMOL/L (ref 135–144)
WBC # BLD: 15.4 K/UL (ref 3.5–11)

## 2022-11-15 PROCEDURE — 6370000000 HC RX 637 (ALT 250 FOR IP): Performed by: INTERNAL MEDICINE

## 2022-11-15 PROCEDURE — 85027 COMPLETE CBC AUTOMATED: CPT

## 2022-11-15 PROCEDURE — 2060000000 HC ICU INTERMEDIATE R&B

## 2022-11-15 PROCEDURE — 6370000000 HC RX 637 (ALT 250 FOR IP): Performed by: STUDENT IN AN ORGANIZED HEALTH CARE EDUCATION/TRAINING PROGRAM

## 2022-11-15 PROCEDURE — 36415 COLL VENOUS BLD VENIPUNCTURE: CPT

## 2022-11-15 PROCEDURE — 2580000003 HC RX 258: Performed by: INTERNAL MEDICINE

## 2022-11-15 PROCEDURE — 2700000000 HC OXYGEN THERAPY PER DAY

## 2022-11-15 PROCEDURE — 82947 ASSAY GLUCOSE BLOOD QUANT: CPT

## 2022-11-15 PROCEDURE — 6360000002 HC RX W HCPCS: Performed by: INTERNAL MEDICINE

## 2022-11-15 PROCEDURE — 97110 THERAPEUTIC EXERCISES: CPT

## 2022-11-15 PROCEDURE — 6370000000 HC RX 637 (ALT 250 FOR IP)

## 2022-11-15 PROCEDURE — 84100 ASSAY OF PHOSPHORUS: CPT

## 2022-11-15 PROCEDURE — 83735 ASSAY OF MAGNESIUM: CPT

## 2022-11-15 PROCEDURE — 99233 SBSQ HOSP IP/OBS HIGH 50: CPT | Performed by: INTERNAL MEDICINE

## 2022-11-15 PROCEDURE — 6360000002 HC RX W HCPCS: Performed by: STUDENT IN AN ORGANIZED HEALTH CARE EDUCATION/TRAINING PROGRAM

## 2022-11-15 PROCEDURE — 99232 SBSQ HOSP IP/OBS MODERATE 35: CPT | Performed by: INTERNAL MEDICINE

## 2022-11-15 PROCEDURE — 94660 CPAP INITIATION&MGMT: CPT

## 2022-11-15 PROCEDURE — 94761 N-INVAS EAR/PLS OXIMETRY MLT: CPT

## 2022-11-15 PROCEDURE — 84145 PROCALCITONIN (PCT): CPT

## 2022-11-15 PROCEDURE — 94640 AIRWAY INHALATION TREATMENT: CPT

## 2022-11-15 PROCEDURE — 97116 GAIT TRAINING THERAPY: CPT

## 2022-11-15 PROCEDURE — 6360000002 HC RX W HCPCS: Performed by: NURSE PRACTITIONER

## 2022-11-15 PROCEDURE — 80048 BASIC METABOLIC PNL TOTAL CA: CPT

## 2022-11-15 PROCEDURE — 97535 SELF CARE MNGMENT TRAINING: CPT

## 2022-11-15 RX ORDER — HYDRALAZINE HYDROCHLORIDE 50 MG/1
50 TABLET, FILM COATED ORAL EVERY 8 HOURS SCHEDULED
Status: DISCONTINUED | OUTPATIENT
Start: 2022-11-15 | End: 2022-11-17 | Stop reason: HOSPADM

## 2022-11-15 RX ADMIN — ONDANSETRON 4 MG: 2 INJECTION INTRAMUSCULAR; INTRAVENOUS at 16:44

## 2022-11-15 RX ADMIN — ATORVASTATIN CALCIUM 40 MG: 40 TABLET, FILM COATED ORAL at 08:14

## 2022-11-15 RX ADMIN — HYDRALAZINE HYDROCHLORIDE 50 MG: 50 TABLET, FILM COATED ORAL at 21:41

## 2022-11-15 RX ADMIN — LISINOPRIL 40 MG: 20 TABLET ORAL at 08:14

## 2022-11-15 RX ADMIN — FLUTICASONE PROPIONATE 1 SPRAY: 50 SPRAY, METERED NASAL at 08:15

## 2022-11-15 RX ADMIN — Medication 60 MG: at 08:13

## 2022-11-15 RX ADMIN — CEFEPIME 2000 MG: 2 INJECTION, POWDER, FOR SOLUTION INTRAVENOUS at 05:43

## 2022-11-15 RX ADMIN — POTASSIUM CHLORIDE 40 MEQ: 1500 TABLET, EXTENDED RELEASE ORAL at 06:52

## 2022-11-15 RX ADMIN — HYDRALAZINE HYDROCHLORIDE 50 MG: 50 TABLET, FILM COATED ORAL at 13:15

## 2022-11-15 RX ADMIN — IPRATROPIUM BROMIDE AND ALBUTEROL SULFATE 1 AMPULE: 2.5; .5 SOLUTION RESPIRATORY (INHALATION) at 14:40

## 2022-11-15 RX ADMIN — IPRATROPIUM BROMIDE AND ALBUTEROL SULFATE 1 AMPULE: 2.5; .5 SOLUTION RESPIRATORY (INHALATION) at 11:09

## 2022-11-15 RX ADMIN — METHYLPREDNISOLONE SODIUM SUCCINATE 30 MG: 40 INJECTION, POWDER, FOR SOLUTION INTRAMUSCULAR; INTRAVENOUS at 00:35

## 2022-11-15 RX ADMIN — METHYLPREDNISOLONE SODIUM SUCCINATE 30 MG: 40 INJECTION, POWDER, FOR SOLUTION INTRAMUSCULAR; INTRAVENOUS at 16:44

## 2022-11-15 RX ADMIN — HYDRALAZINE HYDROCHLORIDE 10 MG: 20 INJECTION INTRAMUSCULAR; INTRAVENOUS at 14:38

## 2022-11-15 RX ADMIN — HYDROCHLOROTHIAZIDE 25 MG: 25 TABLET ORAL at 08:14

## 2022-11-15 RX ADMIN — BENZONATATE 200 MG: 200 CAPSULE ORAL at 08:14

## 2022-11-15 RX ADMIN — IPRATROPIUM BROMIDE AND ALBUTEROL SULFATE 1 AMPULE: 2.5; .5 SOLUTION RESPIRATORY (INHALATION) at 00:09

## 2022-11-15 RX ADMIN — DOXYCYCLINE 100 MG: 100 CAPSULE ORAL at 21:41

## 2022-11-15 RX ADMIN — IPRATROPIUM BROMIDE AND ALBUTEROL SULFATE 1 AMPULE: 2.5; .5 SOLUTION RESPIRATORY (INHALATION) at 07:22

## 2022-11-15 RX ADMIN — METHYLPREDNISOLONE SODIUM SUCCINATE 30 MG: 40 INJECTION, POWDER, FOR SOLUTION INTRAMUSCULAR; INTRAVENOUS at 08:14

## 2022-11-15 RX ADMIN — DILTIAZEM HYDROCHLORIDE 30 MG: 30 TABLET, FILM COATED ORAL at 17:39

## 2022-11-15 RX ADMIN — DILTIAZEM HYDROCHLORIDE 30 MG: 30 TABLET, FILM COATED ORAL at 05:43

## 2022-11-15 RX ADMIN — ENOXAPARIN SODIUM 40 MG: 100 INJECTION SUBCUTANEOUS at 08:14

## 2022-11-15 RX ADMIN — BENZONATATE 200 MG: 200 CAPSULE ORAL at 13:15

## 2022-11-15 RX ADMIN — BENZONATATE 200 MG: 200 CAPSULE ORAL at 21:41

## 2022-11-15 RX ADMIN — LORAZEPAM 0.5 MG: 0.5 TABLET ORAL at 08:15

## 2022-11-15 RX ADMIN — DOXYCYCLINE 100 MG: 100 CAPSULE ORAL at 08:14

## 2022-11-15 RX ADMIN — Medication 60 MG: at 21:41

## 2022-11-15 RX ADMIN — MONTELUKAST 10 MG: 10 TABLET, FILM COATED ORAL at 21:41

## 2022-11-15 RX ADMIN — DILTIAZEM HYDROCHLORIDE 30 MG: 30 TABLET, FILM COATED ORAL at 13:15

## 2022-11-15 RX ADMIN — SODIUM CHLORIDE, PRESERVATIVE FREE 10 ML: 5 INJECTION INTRAVENOUS at 21:47

## 2022-11-15 RX ADMIN — IPRATROPIUM BROMIDE AND ALBUTEROL SULFATE 1 AMPULE: 2.5; .5 SOLUTION RESPIRATORY (INHALATION) at 20:49

## 2022-11-15 RX ADMIN — HYDRALAZINE HYDROCHLORIDE 25 MG: 25 TABLET, FILM COATED ORAL at 05:43

## 2022-11-15 ASSESSMENT — ENCOUNTER SYMPTOMS
NAUSEA: 0
RHINORRHEA: 0
STRIDOR: 0
CONSTIPATION: 0
VOMITING: 0
WHEEZING: 0
COUGH: 0
ABDOMINAL PAIN: 0
DIARRHEA: 0
ABDOMINAL DISTENTION: 0
SHORTNESS OF BREATH: 0

## 2022-11-15 ASSESSMENT — PAIN SCALES - GENERAL
PAINLEVEL_OUTOF10: 0

## 2022-11-15 NOTE — PROGRESS NOTES
Bipap on standby at this time. Pulse Ox--92% 4lpm  Pt did not wear last night. Skin score-0-      Nasal gel pad available at bedside. Pt awake/alert/no distress noted.         BRONCHOSPASM/BRONCHOCONSTRICTION     [x]         IMPROVE AERATION/BREATH SOUNDS  [x]   ADMINISTER BRONCHODILATOR THERAPY AS APPROPRIATE  [x]   ASSESS BREATH SOUNDS  []   IMPLEMENT AEROSOL/MDI PROTOCOL  [x]   PATIENT EDUCATION AS NEEDED   PROVIDE ADEQUATE OXYGENATION WITH ACCEPTABLE SP02/ABG'S    [x]  IDENTIFY APPROPRIATE OXYGEN THERAPY  [x]   MONITOR SP02/ABG'S AS NEEDED   [x]   PATIENT EDUCATION AS NEEDED

## 2022-11-15 NOTE — CARE COORDINATION
ONGOING DISCHARGE PLAN:    Patient is alert and oriented     Spoke with patient regarding discharge plan and patient confirms that plan is still Home with no needs. +Adenovirus    Following for Home oxygen eval and na new nebulizer, will need DME order and face to face. Remains on IV solu medrol,       Will continue to follow for additional discharge needs.     Electronically signed by Mark Hernandez RN on 11/15/2022 at 4:01 PM

## 2022-11-15 NOTE — PROGRESS NOTES
Physical Therapy  Facility/Department: [facility] ICU  Daily Treatment Note  NAME: Jose J Salas  : 1965  MRN: [id number]    Date of Service: 11/15/2022    Discharge Recommendations:  Patient would benefit from continued therapy after discharge (pulmonary rehab)        Patient Diagnosis(es): The primary encounter diagnosis was COPD exacerbation ([facility] [address] Diagnoses of Acute respiratory failure with hypoxia (HCC) and Pneumonia of both lungs due to infectious organism, unspecified part of lung were also pertinent to this visit. Assessment   Activity Tolerance: Patient tolerated treatment well     Plan    Physcial Therapy Plan  General Plan: 3-5 times per week     Restrictions  Restrictions/Precautions  Restrictions/Precautions: Isolation, Fall Risk, Contact Precautions, Up as Tolerated, General Precautions  Required Braces or Orthoses?: No  Implants present? :  (Pt denies)     Subjective    Subjective  Subjective: Pt positioned semifowlers in bed, RN Amparo gamble tx and pt is agreeable.  Pt reportingly dropped a few pills on the  Pain: No c/o pain  Orientation  Overall Orientation Status: Within Functional Limits  Cognition  Overall Cognitive Status: WNL     Objective   Vitals  O2 Device: Nasal cannula  Bed Mobility Training  Bed Mobility Training: Yes (HOB elevated and use of bed rails.)  Rolling: Supervision  Supine to Sit: Supervision  Scooting: Supervision (to EOB)  Balance  Sitting: Intact  Standing: Intact  Transfer Training  Transfer Training: Yes (With and without AD)  Sit to Stand: Stand-by assistance (for line management)  Stand to Sit: Supervision  Stand Pivot Transfers: Stand-by assistance  Bed to Chair: Stand-by assistance  Gait Training  Gait Training: Yes  Gait  Overall Level of Assistance: Stand-by assistance (with and without RW)  Speed/Belinda: Slow  Distance (ft): 5 Feet (x3, pt limited to lines.)  Assistive Device: Walker, rolling  Stairs - Level of Assistance: Supervision  Number of Stairs Trained: 6 (step ups on 6\" box step)     PT Exercises  Resistive Exercises: Seated B LE ex's x10 with LGTB  Dynamic Standing Balance Exercises: Standing in RW B LE ex's x10-15         [facility] Mobility Inpatient   How much difficulty turning over in bed?: A Little  How much difficulty sitting down on / standing up from a chair with arms?: A Little  How much difficulty moving from lying on back to sitting on side of bed?: A Little  How much help from another person moving to and from a bed to a chair?: A Little  How much help from another person needed to walk in hospital room?: A Little  How much help from another person for climbing 3-5 steps with a railing?: A Little  [facility] Inpatient Mobility Raw Score : 18  AM-PAC Inpatient T-Scale Score : 43.63  Mobility Inpatient CMS 0-100% Score: 46.58  Mobility Inpatient CMS G-Code Modifier : CK      Goals  Short Term Goals  Time Frame for Short Term Goals: 8 visits  Short Term Goal 1: pt to demo independent bed mobility  Short Term Goal 2: pt to demo MOD I transfers with device as needed  Short Term Goal 3: pt to ambulate 54-65' with device as needed Mod I or independent  Short Term Goal 4: pt to improve standing balance to FAIR + with device as needed to decrease fall risk  Short Term Goal 5: pt to demo good technique for BLE strengthening exercises for HEP  Additional Goals?: Yes  Short Term Goal 6: pt to negotiate single step without rail and device as needed with SBA  Patient Goals   Patient Goals : To return home    Education  Patient Education  Education Given To: Patient  Education Provided: Precautions; Fall Prevention Strategies; Energy Conservation  Education Method: Verbal  Barriers to Learning: Vision  Education Outcome: Verbalized understanding;Demonstrated understanding;Continued education needed    Safety measures utilized: Gait belt, Call light within reach, Sitting in chair, and Nurse notified        Therapy Time   Individual Concurrent Group Co-treatment   Time In 22 [id number] Time Out 1406         Minutes Jazzy Iqbal, [place]

## 2022-11-15 NOTE — PLAN OF CARE
Problem: Discharge Planning  Goal: Discharge to home or other facility with appropriate resources  Outcome: Progressing  Flowsheets (Taken 11/14/2022 2000 by Nikki Moore RN)  Discharge to home or other facility with appropriate resources:   Identify barriers to discharge with patient and caregiver   Arrange for needed discharge resources and transportation as appropriate   Identify discharge learning needs (meds, wound care, etc)     Problem: Safety - Adult  Goal: Free from fall injury  Outcome: Progressing  Flowsheets (Taken 11/14/2022 0800 by Dilma Zaman RN)  Free From Fall Injury: Instruct family/caregiver on patient safety     Problem: Pain  Goal: Verbalizes/displays adequate comfort level or baseline comfort level  Outcome: Progressing  Flowsheets (Taken 11/14/2022 2000 by Nikki Moore RN)  Verbalizes/displays adequate comfort level or baseline comfort level:   Encourage patient to monitor pain and request assistance   Assess pain using appropriate pain scale   Administer analgesics based on type and severity of pain and evaluate response   Implement non-pharmacological measures as appropriate and evaluate response     Problem: Respiratory - Adult  Goal: Achieves optimal ventilation and oxygenation  Outcome: Progressing  Flowsheets (Taken 11/14/2022 2000 by Nikki Moore RN)  Achieves optimal ventilation and oxygenation:   Assess for changes in respiratory status   Assess for changes in mentation and behavior   Position to facilitate oxygenation and minimize respiratory effort   Oxygen supplementation based on oxygen saturation or arterial blood gases   Encourage broncho-pulmonary hygiene including cough, deep breathe, incentive spirometry   Assess and instruct to report shortness of breath or any respiratory difficulty     Problem: Cardiovascular - Adult  Goal: Maintains optimal cardiac output and hemodynamic stability  Outcome: Progressing  Flowsheets (Taken 11/14/2022 2000 by [id number] [facility] Jen Darden RN)  Maintains optimal cardiac output and hemodynamic stability:   Monitor blood pressure and heart rate   Assess for signs of decreased cardiac output     Problem: Cardiovascular - Adult  Goal: Absence of cardiac dysrhythmias or at baseline  Outcome: Progressing     Problem: Skin/Tissue Integrity - Adult  Goal: Skin integrity remains intact  Outcome: Progressing     Problem: Skin/Tissue Integrity - Adult  Goal: Oral mucous membranes remain intact  Outcome: Progressing  Flowsheets (Taken 11/15/2022 1534)  Oral Mucous Membranes Remain Intact: Assess oral mucosa and hygiene practices     Problem: Death & Dying  Goal: Pt/Family communicate acceptance of impending death and feel psychological comfort and peace  Description: INTERVENTIONS:  1. Assess patient/family anxiety and grief process related to end of life issues  2. Provide emotional and spiritual support  3. Provide information about the patient's health status with consideration of family and cultural values  4. Communicate willingness to discuss death and facilitate grief process  with patient/family as appropriate  5. Emphasize sustaining relationships within family system and community, or amy/spiritual traditions  6. Initiate Spiritual Care, Psychosocial Clinical Specialist, consult as needed  Outcome: Sharon Gatica (Taken 11/14/2022 2000 by Yuri Rose RN)  Patient/family communicates acceptance of loss or impending death and feels physical/psychological comfort and peace:   Provide emotional and spiritual support   Provide information about the patients health status with consideration of family and cultural values     Problem: Decision Making  Goal: Pt/Family able to effectively weigh alternatives and participate in decision making related to treatment and care  Description: INTERVENTIONS:  1. Determine when there are differences between patient's view, family's view, and healthcare provider's view of condition  2.  Facilitate patient and family articulation of goals for care  3. Help patient and family identify pros/cons of alternative solutions  4. Provide information as requested by patient/family  5. Respect patient/family right to receive or not to receive information  6. Serve as a liaison between patient and family and health care team  7. Initiate Consults from Ethics, Palliative Care or initiate [address] as is appropriate  Outcome: Progressing  Flowsheets (Taken 11/14/2022 0800 by Doroteo Sanders, RN)  Patient/family able to effectively weigh alternatives and participate in decision making related to treatment and care: Facilitate patient and family articulation of goals for care   Help patient and family identify pros/cons of alternative solutions     Problem: Infection - Adult  Goal: Absence of infection at discharge  Outcome: Progressing     Problem: Infection - Adult  Goal: Absence of fever/infection during anticipated neutropenic period  Outcome: Progressing  Flowsheets (Taken 11/15/2022 1534)  Absence of fever/infection during anticipated neutropenic period: Monitor white blood cell count     Problem: Skin/Tissue Integrity  Goal: Absence of new skin breakdown  Description: 1. Monitor for areas of redness and/or skin breakdown  2. Assess vascular access sites hourly  3. Every 4-6 hours minimum:  Change oxygen saturation probe site  4. Every 4-6 hours:  If on nasal continuous positive airway pressure, respiratory therapy assess nares and determine need for appliance change or resting period.   Outcome: Progressing     Problem: ABCDS Injury Assessment  Goal: Absence of physical injury  Outcome: Progressing  Flowsheets (Taken 11/14/2022 0800 by Doroteo Sanders RN)  Absence of Physical Injury: Implement safety measures based on patient assessment     Problem: Nutrition Deficit:  Goal: Optimize nutritional status  Outcome: Progressing

## 2022-11-15 NOTE — PROGRESS NOTES
[address]    INPATIENT OCCUPATIONAL THERAPY  PROGRESS NOTE  Date: 11/15/2022  Patient Name: Mariano Royal       Room:   MRN: [id number]    : 1965  (61 y.o.)  Gender: female   Referring Practitioner: Yocasta Bowman MD  Diagnosis: Acute respiratory failure    Restrictions/Precautions  Restrictions/Precautions  Restrictions/Precautions: Isolation; Fall Risk;Contact Precautions; Up as Tolerated;General Precautions  Required Braces or Orthoses?: No         Subjective  Subjective  Subjective: \" I am just so confused, I want to get better and go home\"  Comments: pt is pleasant and cooperative    Objective  Orientation  Overall Orientation Status: Within Functional Limits  Cognition  Overall Cognitive Status: WNL  ADL  Feeding: Setup  Grooming: Setup  UE Bathing: Stand by assistance  LE Bathing: Contact guard assistance  UE Dressing: Stand by assistance  LE Dressing: Contact guard assistance  Toileting: Stand by assistance  Toileting Skilled Clinical Factors: Pt completed toileting on toilet in ICU room, walking to toilet with RW and pulling brief up and down when standing  Additional Comments: ADL scores based on skilled clinical observation only this date unless otherwise stated. Pt limited due to endurance and decreased strength.          Balance  Balance  Sitting Balance: Stand by assistance (EOB and on toilet)  Standing Balance: Contact guard assistance (with RW)  Standing Balance  Time: 1-2 min  Activity: mobility to and from toilet    Transfers/Mobility  Bed mobility  Supine to Sit: Stand by assistance  Sit to Supine: Stand by assistance  Scooting: Stand by assistance  Bed Mobility Comments: HOB elevated  Transfers  Sit to stand: Stand by assistance  Stand to sit: Stand by assistance  Toilet Transfers  Toilet - Technique: Ambulating  Equipment Used: Standard toilet  Toilet Transfer: Stand by assistance    Functional Mobility  Functional - Mobility Device: Rolling Walker  Activity: (to and from toilet in room)  Assist Level: Stand by assistance  Functional Mobility Comments: VCs for safety. ie slow pace and line awareness.  pt requires assist with multiple line mgt during transfer    Patient Education  Patient Education  Education Given To: Patient  Education Provided: Role of Therapy, Plan of Care, Transfer Training  Education Method: Verbal  Barriers to Learning: None  Education Outcome: Verbalized understanding      Goals  Short Term Goals  Time Frame for Short Term Goals: by discharge  Short Term Goal 1: Pt will complete BADLs with modified independence and Good safety while maintaining Spo2 above 90%  Short Term Goal 2: Pt will complete functional transfers/mobility during self care tasks with mod I and Good safety while maintaining SpO2 above 90%  Short Term Goal 3: Pt will tolerate standing 7+ minutes during functional activity of choice with Good safety while maintaining Spo2 above 90%  Short Term Goal 4: Pt will verbalize/demonstrate Good understanding of home safety/fall prevention/energy conservation strategies to increase safety and independence with self care and mobility  Short Term Goal 5: Pt will participate in 15+ minutes of therapeutic exercises/functional activities to increase safety and independence with self care and mobility  Occupational Therapy Plan  Times Per Week: 3-5  Current Treatment Recommendations: Self-Care / ADL, Strengthening, Balance training, Functional mobility training, Endurance training, Safety education & training, Patient/Caregiver education & training, Equipment evaluation, education, & procurement, Home management training      Assessment  Activity Tolerance  Activity Tolerance: Patient Tolerated treatment well, Patient limited by fatigue  Assessment  Performance deficits / Impairments: Decreased ADL status, Decreased functional mobility , Decreased endurance, Decreased balance, Decreased high-level IADLs  Treatment Diagnosis: Impaired self care status  Prognosis: Good  Decision Making: Medium Complexity  Discharge Recommendations: Patient would benefit from continued therapy after discharge  OT Equipment Recommendations  Other: TBD  Safety Devices  Type of Devices: Call light within reach, Gait belt, Patient at risk for falls, Left in bed, Nurse notified      AM-PAC Daily Activities Inpatient  AM-PAC Daily Activity Inpatient   How much help for putting on and taking off regular lower body clothing?: A Little  How much help for Bathing?: A Little  How much help for Toileting?: A Little  How much help for putting on and taking off regular upper body clothing?: A Little  How much help for taking care of personal grooming?: A Little  How much help for eating meals?: A Little  AM-PAC Inpatient Daily Activity Raw Score: 18  AM-PAC Inpatient ADL T-Scale Score : 38.66  ADL Inpatient CMS 0-100% Score: 46.65  ADL Inpatient CMS G-Code Modifier : CK    OT Minutes  OT Individual Minutes  Time In: 0820  Time Out: 5071  Minutes: 23      THERON Hillman

## 2022-11-15 NOTE — PROGRESS NOTES
Prayed outside patient's room     11/14/22 2814   Encounter Summary   Encounter Overview/Reason  Spiritual/Emotional Needs   Service Provided For: Patient   Referral/Consult From: Rounding   Complexity of Encounter Low   Spiritual/Emotional needs   Type Spiritual Support   Assessment/Intervention/Outcome   Assessment Unable to assess  (patient with nurse)   Intervention Prayer (assurance of)/[place]

## 2022-11-15 NOTE — PLAN OF CARE
Problem: Discharge Planning  Goal: Discharge to home or other facility with appropriate resources  11/14/2022 2135 by Jeanette Zamora RN  Outcome: Progressing  11/14/2022 1746 by Rachel Munoz RN  Outcome: Progressing  Flowsheets (Taken 11/14/2022 0800)  Discharge to home or other facility with appropriate resources: Refer to discharge planning if patient needs post-hospital services based on physician order or complex needs related to functional status, cognitive ability or social support system     Problem: Safety - Adult  Goal: Free from fall injury  11/14/2022 2135 by Jeanette Zamora RN  Outcome: Progressing  11/14/2022 1746 by Rachel Munoz RN  Outcome: Progressing  Flowsheets (Taken 11/14/2022 0800)  Free From Fall Injury: Instruct family/caregiver on patient safety     Problem: Pain  Goal: Verbalizes/displays adequate comfort level or baseline comfort level  11/14/2022 2135 by Jeanette Zamora RN  Outcome: Progressing  11/14/2022 1746 by Rachel Munoz RN  Outcome: Progressing  Flowsheets (Taken 11/14/2022 0800)  Verbalizes/displays adequate comfort level or baseline comfort level:   Encourage patient to monitor pain and request assistance   Assess pain using appropriate pain scale     Problem: Respiratory - Adult  Goal: Achieves optimal ventilation and oxygenation  11/14/2022 2135 by Jeanette Zamora RN  Outcome: Progressing  11/14/2022 1746 by Rachel Munoz RN  Outcome: Progressing  Flowsheets (Taken 11/14/2022 0800)  Achieves optimal ventilation and oxygenation:   Assess for changes in respiratory status   Assess for changes in mentation and behavior   Position to facilitate oxygenation and minimize respiratory effort   Assess and instruct to report shortness of breath or any respiratory difficulty   Respiratory therapy support as indicated     Problem: Cardiovascular - Adult  Goal: Maintains optimal cardiac output and hemodynamic stability  11/14/2022 2135 by Arabella Albarran [employer]  Outcome: Progressing  11/14/2022 1746 by Ki Agustin RN  Outcome: Progressing  Flowsheets (Taken 11/14/2022 0800)  Maintains optimal cardiac output and hemodynamic stability:   Monitor blood pressure and heart rate   Assess for signs of decreased cardiac output  Goal: Absence of cardiac dysrhythmias or at baseline  11/14/2022 2135 by Sol Cavazos RN  Outcome: Progressing  11/14/2022 1746 by Ki Agustin RN  Outcome: Progressing  Flowsheets (Taken 11/14/2022 0800)  Absence of cardiac dysrhythmias or at baseline:   Monitor cardiac rate and rhythm   Assess for signs of decreased cardiac output   Administer antiarrhythmia medication and electrolyte replacement as ordered     Problem: Skin/Tissue Integrity - Adult  Goal: Skin integrity remains intact  11/14/2022 2135 by Sol Cavazos RN  Outcome: Progressing  11/14/2022 1746 by Ki Agustin RN  Outcome: Progressing  Flowsheets  Taken 11/14/2022 1600  Skin Integrity Remains Intact: Monitor for areas of redness and/or skin breakdown  Taken 11/14/2022 0800  Skin Integrity Remains Intact:   Monitor for areas of redness and/or skin breakdown   Assess vascular access sites hourly   Every 4-6 hours minimum: Change oxygen saturation probe site   Every 4-6 hours: If on nasal continuous positive airway pressure, respiratory therapy assesses nares and determine need for appliance change or resting period  Goal: Oral mucous membranes remain intact  11/14/2022 2135 by Sol Cavazos RN  Outcome: Progressing  11/14/2022 1746 by Ki Agustin RN  Outcome: Progressing  Flowsheets  Taken 11/14/2022 1600  Oral Mucous Membranes Remain Intact: Implement preventative oral hygiene regimen  Taken 11/14/2022 0800  Oral Mucous Membranes Remain Intact:   Assess oral mucosa and hygiene practices   Implement preventative oral hygiene regimen   Implement oral medicated treatments as ordered     Problem: Death & Dying  Goal: Pt/Family communicate acceptance of impending death and feel psychological comfort and peace  Description: INTERVENTIONS:  1. Assess patient/family anxiety and grief process related to end of life issues  2. Provide emotional and spiritual support  3. Provide information about the patient's health status with consideration of family and cultural values  4. Communicate willingness to discuss death and facilitate grief process  with patient/family as appropriate  5. Emphasize sustaining relationships within family system and community, or amy/spiritual traditions  6. Initiate Spiritual Care, Psychosocial Clinical Specialist, consult as needed  11/14/2022 2135 by Yong Peters RN  Outcome: Progressing  11/14/2022 1746 by Terri Fisher RN  Outcome: Progressing  Flowsheets (Taken 11/14/2022 0800)  Patient/family communicates acceptance of loss or impending death and feels physical/psychological comfort and peace: Provide emotional and spiritual support     Problem: Decision Making  Goal: Pt/Family able to effectively weigh alternatives and participate in decision making related to treatment and care  Description: INTERVENTIONS:  1. Determine when there are differences between patient's view, family's view, and healthcare provider's view of condition  2. Facilitate patient and family articulation of goals for care  3. Help patient and family identify pros/cons of alternative solutions  4. Provide information as requested by patient/family  5. Respect patient/family right to receive or not to receive information  6. Serve as a liaison between patient and family and health care team  7.  Initiate Consults from Ethics, Palliative Care or initiate [facility] as is appropriate  11/14/2022 2135 by Yong Peters RN  Outcome: Progressing  11/14/2022 1746 by Terri Fisher RN  Outcome: Progressing  Flowsheets (Taken 11/14/2022 0800)  Patient/family able to effectively weigh alternatives and participate in decision making related to treatment and care: Facilitate patient and family articulation of goals for care   Help patient and family identify pros/cons of alternative solutions     Problem: Infection - Adult  Goal: Absence of infection at discharge  11/14/2022 2135 by Rosie Cazares RN  Outcome: Progressing  11/14/2022 1746 by Kay Interiano RN  Outcome: Progressing  Flowsheets (Taken 11/14/2022 0800)  Absence of infection at discharge:   Assess and monitor for signs and symptoms of infection   Monitor lab/diagnostic results   Administer medications as ordered   Instruct and encourage patient and family to use good hand hygiene technique  Goal: Absence of fever/infection during anticipated neutropenic period  11/14/2022 2135 by Rosie Cazares RN  Outcome: Progressing  11/14/2022 1746 by Kay Interiano RN  Outcome: Progressing  Flowsheets (Taken 11/14/2022 0800)  Absence of fever/infection during anticipated neutropenic period:   Monitor white blood cell count   Administer growth factors as ordered   Implement neutropenic guidelines     Problem: Skin/Tissue Integrity  Goal: Absence of new skin breakdown  Description: 1. Monitor for areas of redness and/or skin breakdown  2. Assess vascular access sites hourly  3. Every 4-6 hours minimum:  Change oxygen saturation probe site  4. Every 4-6 hours:  If on nasal continuous positive airway pressure, respiratory therapy assess nares and determine need for appliance change or resting period.   11/14/2022 2135 by Rosie Cazares RN  Outcome: Progressing  11/14/2022 1746 by Kay Interiano RN  Outcome: Progressing     Problem: ABCDS Injury Assessment  Goal: Absence of physical injury  11/14/2022 2135 by Rosie Cazares RN  Outcome: Progressing  11/14/2022 1746 by Kay Interiano RN  Outcome: Progressing  Flowsheets (Taken 11/14/2022 0800)  Absence of Physical Injury: Implement safety measures based on patient assessment     Problem: Nutrition Deficit:  Goal: Optimize nutritional status  11/14/2022 2135 by Teresa Rodriguez RN  Outcome: Progressing  11/14/2022 1746 by Jamey March RN  Outcome: Progressing

## 2022-11-15 NOTE — PROGRESS NOTES
[facility] -   Infectious diseases evaluation  admission date 11/8/2022    reason for consultation:   Atypical pneumonia    Impression :   Current:  Adenovirus respiratory infection with possible superimposed bacterial infection. Acute COPD exacerbation  Hyponatremia  Chronic hypercapnic respiratory failure  History of smoking    Recommendations   QuantiFERON-TB test was negative  Discontinue IV cefepime   P.o. doxycycline through 11/16/2022  Nasal swab for MRSA was negative  Procalcitonin level was 0.1 and lactic acid was 0.8 on 11/10/2022  Mycoplasma IgM negative  Legionella urine antigen negative  Normal valdez growth on respiratory culture  HIV screen negative  Respiratory panel was positive for adenovirus PCR  Continue supportive care    Infection Control Recommendations   [place] Precautions  Droplet Isolation      Antimicrobial Stewardship Recommendations   Simplification of therapy  Targeted therapy      History of Present Illness:   Initial history:  Jair Mojica is a 61y.o.-year-old female with history of smoking, COPD, chronic hypercapnic respiratory failure, noncompliant with home medication. She presented to hospital with worsening shortness of breath associated with cough productive of green phlegm for several days, symptoms moderate to severe. CT chest was negative for pulmonary embolism but showed multiple reticular nodule infiltrates in the upper lobes right more than left. Respiratory panel was positive for adenovirus PCR. The patient denied sick contact, no recent travel, denied hemoptysis, no chronic fatigue or night sweats. She is actively smoking, denied alcohol or drug abuse.     Interval changes  11/15/2022   She continues to improve, afebrile, on 4 L of oxygen per nasal cannula, blood pressure on the high side, no other complaints    Patient Vitals for the past 8 hrs:   BP Temp Temp src Pulse Resp SpO2   11/15/22 1110 -- -- -- 77 23 93 % 11/15/22 1100 -- -- -- -- 14 --   11/15/22 0800 (!) 168/65 97.7 °F (36.5 °C) Oral 77 18 93 %   11/15/22 0726 -- -- -- 76 20 94 %   11/15/22 0700 -- -- -- -- 18 --   11/15/22 0607 (!) 167/69 -- -- 73 21 90 %               I have personally reviewed the past medical history, past surgical history, medications, social history, and family history, and I haveupdated the database accordingly. Allergies:   Patient has no known allergies. Review of Systems:     Review of Systems  As per history of present illness, other than above 12 system review was negative  Physical Examination :       Physical Exam  HENT:      Right Ear: External ear normal.      Left Ear: External ear normal.      Mouth/Throat:      Pharynx: No oropharyngeal exudate. Cardiovascular:      Rate and Rhythm: Normal rate and regular rhythm. Pulmonary:      Effort: Pulmonary effort is normal.      Breath sounds: No wheezing or rhonchi. Abdominal:      General: There is no distension. Palpations: Abdomen is soft. Tenderness: There is no abdominal tenderness. Musculoskeletal:      Right lower leg: No edema. Left lower leg: No edema. Skin:     General: Skin is warm. Coloration: Skin is not jaundiced.        Past Medical History:     Past Medical History:   Diagnosis Date    Acute respiratory failure ([address]     due to COPD exacerbation    COPD (chronic obstructive pulmonary disease) ([address]     with hypoxia    Cough     Current every day smoker     Dyspnea     Heart murmur     as a child    Hypertension     Shortness of breath        Past Surgical  History:     Past Surgical History:   Procedure Laterality Date     SECTION         Medications:      hydrALAZINE  50 mg Oral 3 times per day    methylPREDNISolone  30 mg IntraVENous Q8H    hydroCHLOROthiazide  25 mg Oral Daily    insulin lispro  0-8 Units SubCUTAneous TID WC    insulin lispro  0-4 Units SubCUTAneous Nightly    fluticasone  1 spray Each Nostril Daily lisinopril  40 mg Oral Daily    dilTIAZem  30 mg Oral 4 times per day    ipratropium-albuterol  1 ampule Inhalation Q4H    doxycycline monohydrate  100 mg Oral 2 times per day    cefepime  2,000 mg IntraVENous Q8H    sodium chloride flush  5-40 mL IntraVENous 2 times per day    enoxaparin  40 mg SubCUTAneous Daily    atorvastatin  40 mg Oral Daily    montelukast  10 mg Oral Nightly    benzonatate  200 mg Oral TID    dextromethorphan  60 mg Oral 2 times per day    nicotine  1 patch TransDERmal Daily       Social History:     Social History     Socioeconomic History    Marital status: Legally      Spouse name: Not on file    Number of children: Not on file    Years of education: Not on file    Highest education level: Not on file   Occupational History    Not on file   Tobacco Use    Smoking status: Every Day     Packs/day: 0.50     Types: Cigarettes    Smokeless tobacco: Never   Vaping Use    Vaping Use: Some days   Substance and Sexual Activity    Alcohol use: No    Drug use: No    Sexual activity: Not on file   Other Topics Concern    Not on file   Social History Narrative    Not on file     Social Determinants of Health     Financial Resource Strain: Not on file   Food Insecurity: Not on file   Transportation Needs: Not on file   Physical Activity: Not on file   Stress: Not on file   Social Connections: Not on file   Intimate Partner Violence: Not on file   Housing Stability: Not on file       Family History:   History reviewed. No pertinent family history.    Medical Decision Making:   I have independently reviewed/ordered the following labs:    CBC with Differential:   Recent Labs     11/14/22 0440 11/15/22  0430   WBC 21.1* 15.4*   HGB 15.0 15.3   HCT 45.2 46.0    227       BMP:  Recent Labs     11/14/22 0440 11/15/22  0430   * 133*   K 3.3* 3.3*   CL 92* 92*   CO2 34* 35*   BUN 15 14   CREATININE 0.40* <0.40*   MG 1.9 2.2       Hepatic Function Panel: No results for input(s): PROT, LABALBU, BILIDIR, IBILI, BILITOT, ALKPHOS, ALT, AST in the last 72 hours. No results for input(s): RPR in the last 72 hours. No results for input(s): HIV in the last 72 hours. No results for input(s): BC in the last 72 hours. Lab Results   Component Value Date/Time    CREATININE <0.40 11/15/2022 04:30 AM    GLUCOSE 135 11/15/2022 04:30 AM       Detailed results: Thank you for allowing us to participate in the care of this patient. Please call with questions. This note is created with the assistance of a speech recognition program.  While intending to generate adocument that actually reflects the content of the visit, the document can still have some errors including those of syntax and sound a like substitutions which may escape proof reading. It such instances, actual meaningcan be extrapolated by contextual diversion.     Derrick Holguin MD  Office: [phone]  Perfect serve / office [phone]

## 2022-11-15 NOTE — PROGRESS NOTES
[facility] [employer]    PROGRESS NOTE             11/15/2022    8:33 AM    Name:   Sheri Brown  MRN:     [id number]     Acct:      [de-identified]   Room:   2006/2006-01  IP Day:  7  Admit Date:  11/8/2022 11:16 AM    PCP:  Jordyn Garcia DTR  Code Status:  Full Code    Subjective:     C/C:   Chief Complaint   Patient presents with    Dizziness    Cough    Shortness of Breath     Interval History Status: improved. The patient was seen and examined at the bedside. The patient has been refusing to wear BiPAP and has been refusing Precedex. Vital signs stable. /69. Currently saturating 94% on 4 L NC (was on 5 L yesterday). Cardene drip discontinued. K+ low (3.3). Continue to monitor. Na+ low (133). Cefepime and Doxycycline day 5 of 7    Brief History:      The patient is a 61year old non- female with a history of COPD (not on home oxygen), smoking (60 pack-years), hypertension, and hyperlipidemia who presents with shortness of breath worsening over the past week. The patient endorses a cough productive of green phlegm that has also worsened over the past week. She denies headache, dizziness, syncope, fever, chills, abdominal pain, nausea, vomiting, constipation and diarrhea. In the ED, the patient was hypoxic  (Sp02 58% on room air), tachycardic (108), and tachypneic (34). ABGs showed hypercapnia (pC02 58.3) and hypoxia (pO2 63.4). The patient was placed on BiPAP, but did not tolerate it and was placed on high flow nasal cannula. The patient was on 30 L high flow with 50% Fi02 and saturating 92%. The patient was given one dose of Solumedrol 125 mg and one dose of Duoneb nebulizer. The patient was given a bolus of IV normal saline. Respiratory panel was positive for adenovirus. IV ceftriaxone was started. Glucose was 158. Creatinine was 0.71. WBCs were elevated at 18.4. Troponin was elevated at 56; repeat 50.  EKG showed sinus tachycardia with occasional PVCs, possible LA enlargement, right superior axis deviation, and incomplete right bundle branch block, RV hypertrophy, T wave abnormality (possible inferior and anterolateral ischemia). CT chest was negative for pulmonary embolism, but showed multifocal tree-in-bud and nodular opacities throughout both lungs likely representing recurrent aspiration or atypical mycobacterium infection; mild emphysema, bronchiectasis and scarring along medial right upper lobe. The patient was admitted for management of acute on chronic hypoxic, hypercapnic respiratory failure secondary to COPD exacerbation and multifocal pneumonia. Pulmonology and infectious disease were consulted     Review of Systems:     Review of Systems   Constitutional:  Negative for activity change, chills, fatigue and fever. HENT:  Negative for congestion and rhinorrhea. Respiratory:  Negative for cough, shortness of breath, wheezing and stridor. Cardiovascular:  Negative for chest pain, palpitations and leg swelling. Gastrointestinal:  Negative for abdominal distention, abdominal pain, constipation, diarrhea, nausea and vomiting. Endocrine: Negative for cold intolerance and heat intolerance. Genitourinary:  Negative for dysuria, frequency and urgency. Musculoskeletal:  Negative for arthralgias and myalgias. Skin:  Negative for pallor and rash. Neurological:  Negative for dizziness, tremors, syncope, weakness, light-headedness and headaches. Psychiatric/Behavioral:  Negative for agitation and confusion. Medications:      Allergies:  No Known Allergies    Current Meds:   Scheduled Meds:    hydrALAZINE  50 mg Oral 3 times per day    methylPREDNISolone  30 mg IntraVENous Q8H    hydroCHLOROthiazide  25 mg Oral Daily    insulin lispro  0-8 Units SubCUTAneous TID WC    insulin lispro  0-4 Units SubCUTAneous Nightly    fluticasone  1 spray Each Nostril Daily    lisinopril  40 mg Oral Daily dilTIAZem  30 mg Oral 4 times per day    ipratropium-albuterol  1 ampule Inhalation Q4H    doxycycline monohydrate  100 mg Oral 2 times per day    cefepime  2,000 mg IntraVENous Q8H    sodium chloride flush  5-40 mL IntraVENous 2 times per day    enoxaparin  40 mg SubCUTAneous Daily    atorvastatin  40 mg Oral Daily    montelukast  10 mg Oral Nightly    benzonatate  200 mg Oral TID    dextromethorphan  60 mg Oral 2 times per day    nicotine  1 patch TransDERmal Daily     Continuous Infusions:    dextrose      sodium chloride       PRN Meds: LORazepam, potassium chloride **OR** potassium alternative oral replacement **OR** potassium chloride, hydrALAZINE, sodium phosphate IVPB **OR** sodium phosphate IVPB **OR** sodium phosphate IVPB, glucose, dextrose bolus **OR** dextrose bolus, glucagon (rDNA), dextrose, perflutren lipid microspheres, sodium chloride flush, sodium chloride flush, sodium chloride, ondansetron **OR** ondansetron, polyethylene glycol, acetaminophen **OR** acetaminophen    Data:     Past Medical History:   has a past medical history of Acute respiratory failure ([facility] [address] COPD (chronic obstructive pulmonary disease) ([facility] [address] Cough, Current every day smoker, Dyspnea, Heart murmur, Hypertension, and Shortness of breath. Social History:   reports that she has been smoking cigarettes. She has been smoking an average of .5 packs per day. She has never used smokeless tobacco. She reports that she does not drink alcohol and does not use drugs. Family History: History reviewed. No pertinent family history. Vitals:  BP (!) 167/69   Pulse 76   Temp 98.2 °F (36.8 °C) (Oral)   Resp 20   Ht 4' 11\" (1.499 m)   Wt 129 lb 3 oz (58.6 kg)   SpO2 94%   BMI 26.09 kg/m²   Temp (24hrs), Av.3 °F (36.8 °C), Min:98.1 °F (36.7 °C), Max:98.6 °F (37 °C)    Recent Labs     22  0707 22  1118 22  1613 22   POCGLU 136* 141* 121* 137*       I/O(24Hr):     Intake/Output Summary (Last 24 hours) at 11/15/2022 7856  Last data filed at 11/15/2022 0451  Gross per 24 hour   Intake 2458.93 ml   Output --   Net 2458.93 ml       Labs:  [unfilled]    Lab Results   Component Value Date/Time    SPECIAL NOT REPORTED 10/28/2019 07:58 PM     Lab Results   Component Value Date/Time    CULTURE NORMAL RESPIRATORY DAVID MODERATE GROWTH 11/08/2022 10:25 PM       [unfilled]    Radiology:    XR CHEST PORTABLE    Result Date: 11/12/2022  EXAMINATION: ONE XRAY VIEW OF THE CHEST 11/12/2022 6:24 am COMPARISON: Chest radiograph performed 11/10/2022. HISTORY: ORDERING SYSTEM PROVIDED HISTORY: Adenovirus pneumonia, severe COPD TECHNOLOGIST PROVIDED HISTORY: Adenovirus pneumonia, severe COPD Reason for Exam: adenovirus pneumonia, severe COPD FINDINGS: There is no acute consolidation or effusion. There is no pneumothorax. The mediastinal structures are unremarkable. The upper abdomen is unremarkable. The extrathoracic soft tissues are unremarkable. There is a right-sided PICC line with the tip in the mid SVC. No acute cardiopulmonary process. Right-sided PICC line with the tip in the mid SVC. XR CHEST PORTABLE    Result Date: 11/10/2022  EXAMINATION: ONE XRAY VIEW OF THE CHEST 11/10/2022 7:46 am COMPARISON: 11/08/2022, 10/29/2019 HISTORY: ORDERING SYSTEM PROVIDED HISTORY: SOB TECHNOLOGIST PROVIDED HISTORY: SOB Reason for Exam: dyspnea FINDINGS: Interstitial opacities with mild septal thickening has increased in the interval.  Perihilar opacities are noted as well. No pneumothorax or significant effusion appreciated. Cardiac and mediastinal contours appear unchanged. Increasing interstitial opacities, which may represent developing edema versus inflammatory process or atypical infection.      XR CHEST PORTABLE    Result Date: 11/8/2022  EXAMINATION: ONE XRAY VIEW OF THE CHEST 11/8/2022 8:37 am COMPARISON: 10/19/2019 HISTORY: ORDERING SYSTEM PROVIDED HISTORY: sob TECHNOLOGIST PROVIDED HISTORY: sob Reason for Exam: sob FINDINGS: Cardial pericardial silhouette is prominent but stable. Increased interstitial opacities noted bilaterally. Focal infiltrate is not identified. No pneumothorax. No free air. No acute bony abnormality. Increased interstitial opacity. While much or all of this may be chronic, please correlate with any clinical evidence of acute interstitial edema. CT CHEST PULMONARY EMBOLISM W CONTRAST    Result Date: 11/8/2022  EXAMINATION: CTA OF THE CHEST 11/8/2022 12:44 pm TECHNIQUE: CTA of the chest was performed after the administration of intravenous contrast.  Multiplanar reformatted images are provided for review. MIP images are provided for review. Automated exposure control, iterative reconstruction, and/or weight based adjustment of the mA/kV was utilized to reduce the radiation dose to as low as reasonably achievable. COMPARISON: 10/31/2019 HISTORY: ORDERING SYSTEM PROVIDED HISTORY: odette tachycardia TECHNOLOGIST PROVIDED HISTORY: odette tachycardia Decision Support Exception - unselect if not a suspected or confirmed emergency medical condition->Emergency Medical Condition (MA) Reason for Exam: sob, COPD 79-year-old female with shortness of breath, COPD, tachycardia FINDINGS: Pulmonary Arteries: No obvious filling defect in the pulmonary arterial vasculature that meets the criteria for pulmonary embolus within the limitations of this study. Evaluation of the pulmonary arterial vasculature is limited due to streak artifact from the contrast bolus in the SVC, suboptimal bolus timing, and respiratory motion. Mediastinum: Visualized thyroid gland grossly unremarkable in appearance. Atheromatous plaque and atherosclerotic calcification of the aorta. Coronary artery disease. Bilateral hilar lymph node enlargement. No axillary lymphadenopathy. No mediastinal lymphadenopathy. No dissection flap within the visualized thoracic aorta. No pericardial or pleural effusions.   No periaortic or mediastinal hemorrhage. Lungs/pleura: Trachea and proximal central airways appear patent. Respiratory motion throughout both lungs. Mild emphysema. Bronchiectasis and scarring along the medial right upper lobe. Tree-in-bud and nodular opacities throughout the bilateral upper lobes. Similar findings are seen within the right middle lobe, lingula, and superior segments of the lower lobes as well as the anteroinferior lower lobes. Upper Abdomen: Fatty liver. Atheromatous plaque and atherosclerotic calcification of the upper abdominal aorta and branch vasculature. Evaluation of the upper abdomen is limited due to respiratory motion. Soft Tissues/Bones: Mild diffuse degenerative changes throughout the spine. 1. No clear evidence for central pulmonary embolus within the limitations of this study. 2. Multifocal tree-in-bud and nodular opacities throughout both lungs as detailed above likely representing sequela of recurrent aspiration or atypical mycobacterial infection. Follow-up is recommended to document resolution. 3. Mild emphysema. Respiratory motion. Bronchiectasis and scarring along the medial right upper lobe. 4. Atheromatous plaque and atherosclerotic calcification of the aorta. Coronary artery disease. 5. Bilateral hilar lymph node enlargement, likely reactive. 6. Fatty liver.      VL Upper Extremity Venous Duplex Left    Result Date: 11/11/2022    [facility]  Vascular Upper Extremities Veins Procedure   Patient Name   Yuri DAUGHERTY  Date of Study           11/10/2022   Date of Birth  1965  Gender                  Female   Age            61 year(s)  Race                       Room Number    2006        Height:                 59 inch, 149.86 cm   Corporate ID # [id number]    Weight:                 128 pounds, 58.1 kg   Patient Acct # [de-identified]   BSA:        1.53 m^2    BMI:       25.85 kg/m^2   MR #           [id number]      Sonographer             Erica Chakraborty JOSEFINA   Accession # [id number]  Interpreting Physician  Fanny Sanchez   Referring                  Referring Physician     Lashon Triana  Nurse  Practitioner  Procedure Type of Study:   Veins: Upper Extremities Veins, Venous Scan Upper Left. Indications for Study:R/O DVT. Patient Status: In Patient. Technical Quality:Adequate visualization. Conclusions   Summary   No evidence of superficial or deep venous thrombosis in the left upper  extremity. Signature   ----------------------------------------------------------------  Electronically signed by Krista Zapata RVT(Sonographer) on  11/10/2022 12:49 PM  ----------------------------------------------------------------   ----------------------------------------------------------------  Electronically signed by Wyona Beards Reyes,Arthur(Interpreting  physician) on 11/11/2022 02:42 PM  ----------------------------------------------------------------  Findings:   Right Impression:          Left Impression:  Right subclavian vein is   Left internal jugular, subclavian, axillary,  compressible with normal   brachial, ulnar, radial, cephalic and basilic  doppler responses. veins are compressible with normal doppler                             responses. Velocities are measured in cm/s ; Diameters are measured in cm Right UE Vein Measurements 2D Measurements +---------------+-----------------+----------------------+-----------------+ ! Location       ! Visualized       ! Compressibility       ! Thrombosis       ! +---------------+-----------------+----------------------+-----------------+ ! Dist SCV       ! Yes              ! Yes                   ! None             ! +---------------+-----------------+----------------------+-----------------+ Left UE Vein Measurements 2D Measurements +------------------------------------+----------+---------------+----------+ ! Location                            ! Visualized! Compressibility! Thrombosis! +------------------------------------+----------+---------------+----------+ ! Prox IJV                            ! Yes       ! Yes            ! None      ! +------------------------------------+----------+---------------+----------+ ! Dist IJV                            ! Yes       ! Yes            ! None      ! +------------------------------------+----------+---------------+----------+ ! Prox SCV                            ! Yes       ! Yes            ! None      ! +------------------------------------+----------+---------------+----------+ ! Dist SCV                            ! Yes       ! Yes            ! None      ! +------------------------------------+----------+---------------+----------+ ! Prox Axillary                       ! Yes       ! Yes            ! None      ! +------------------------------------+----------+---------------+----------+ ! Dist Axillary                       ! Yes       ! Yes            ! None      ! +------------------------------------+----------+---------------+----------+ ! Prox Brachial                       !Yes       ! Yes            ! None      ! +------------------------------------+----------+---------------+----------+ ! Dist Brachial                       !Yes       ! Yes            ! None      ! +------------------------------------+----------+---------------+----------+ ! Prox Radial                         !Yes       ! Yes            ! None      ! +------------------------------------+----------+---------------+----------+ ! Dist Radial                         !Yes       ! Yes            ! None      ! +------------------------------------+----------+---------------+----------+ ! Prox Ulnar                          ! Yes       ! Yes            ! None      ! +------------------------------------+----------+---------------+----------+ ! Dist Ulnar                          ! Yes       ! Yes            ! None      ! +------------------------------------+----------+---------------+----------+ ! Shelley at UA !Yes       !Yes            ! None      ! +------------------------------------+----------+---------------+----------+ ! Basilic at AF                       ! Yes       ! Yes            ! None      ! +------------------------------------+----------+---------------+----------+ ! Basilic at [address]                       ! Yes       ! Yes            ! None      ! +------------------------------------+----------+---------------+----------+ ! Cephalic at UA                      ! Yes       ! Yes            ! None      ! +------------------------------------+----------+---------------+----------+ ! Cephalic at AF                      ! Yes       ! Yes            ! None      ! +------------------------------------+----------+---------------+----------+ ! Cephalic at [address]                      ! Yes       ! Yes            ! None      ! +------------------------------------+----------+---------------+----------+        Physical Examination:        Physical Exam  Constitutional:       General: She is not in acute distress. Appearance: Normal appearance. HENT:      Head: Normocephalic and atraumatic. Nose: No congestion or rhinorrhea. Eyes:      Extraocular Movements: Extraocular movements intact. Conjunctiva/sclera: Conjunctivae normal.      Pupils: Pupils are equal, round, and reactive to light. Cardiovascular:      Rate and Rhythm: Normal rate and regular rhythm. Pulses: Normal pulses. Heart sounds: No murmur heard. No friction rub. No gallop. Pulmonary:      Effort: Pulmonary effort is normal. No respiratory distress. Breath sounds: Normal breath sounds. No wheezing, rhonchi or rales. Comments: Coarse breath sounds   Abdominal:      General: Abdomen is flat. Bowel sounds are normal. There is no distension. Tenderness: There is no abdominal tenderness. There is no guarding. Musculoskeletal:      Right lower leg: No edema. Left lower leg: No edema.    Skin:     Capillary Refill: Capillary refill takes less than 2 seconds. Coloration: Skin is not jaundiced or pale. Neurological:      General: No focal deficit present. Mental Status: She is alert and oriented to person, place, and time. Sensory: No sensory deficit. Motor: No weakness. Psychiatric:         Mood and Affect: Mood normal.         Assessment:        Primary Problem  Acute respiratory failure [facility])    Active Hospital Problems    Diagnosis Date Noted    Acute respiratory failure ([facility] [J96.00] 11/08/2022     Priority: Medium    Chronic obstructive pulmonary disease ([facility] [J44.9] 10/28/2019       Plan:         Acute on chronic hypoxic, hypercapnic respiratory failure 2/2 COPD exacerbation and multifocal pneumonia r/o TB  -ABGs 11/10: pH 7.325, pC02 81.2, p02 70.6; HC03 42.3  -Patient saturating 94% on 5 L. The patient will receive BiPap when sleeping (however she has been refusing due to anxiety)  -CXR: increased interstitial opacity (edema vs. Atypical resp. Infection)  -Repeat CXR 11/12: no acute cardiopulmonary process  -CT PE: multifocal tree-in-bud and nodular opacities throughout both lungs likely representing recurrent aspiration or atypical mycobacterium infection; mild emphysema, bronchiectasis and scarring along medial right upper lobe; Lt. subclavian artery stenosis vs. occlusion  -WBCs on admission 18.4; today: 15.4  -Respiratory viral panel: positive for adenovirus  -Respiratory culture normal valdez  -Legionella negative, S.  Pneumoniae negative, Mycoplasma negative  -QuantiFERON gold test: negative   -Pro-BNP: 1,874  -Echo showed EF of about 55%  -Pro-calcitonin 0.10  -Lactic acid 0.8  -Continue Cefepime 2000 mg IV q 8 for 7 days (last dose 11/17)  -Continue Doxycycline 100 mg po q 12 hours for 7 days (last dose 11/17)  -Decrease Solu-medrol to 30 mg q 8 hours   -Continue DuoNeb aerols  -Continue anti-tussives: Benzozonate, Delsym   -Continue Singulair 10 mg po nightly   -Continue Flonase -Pulmonology consulted (decrease Solu-Medrol to 30 q 8 hours)  -ID consulted (continue Cefepime and Doxycycline)     Hypokalemia   -K+ today 3.3  -Mg 2.2  -Start 40 mEq potassium chloride po once   -K+ replacement protocol in place, currently replacing   -Continue to monitor      Hyperlipidemia  -Continue Lipitor 40 mg po daily      Hypertension  -Hypertensive urgency overnight (systolic > 516); now resolved   -Continue Hydralazine 10 mg IV q 6 hours PRN  -Start Hydralazine 50 mg po q 8 hours   -Continue Lisinopril 40 mg po daily (home med)  -Continue Diltiazem 30 mg po q 6 hours   -Continue HCTZ 25 mg po daily   -Discontinue Lopressor 5 mg IV q 6 hours PRN   -Cardene drip discontinued      Contact and droplet isolation   DVT prophylaxis: Lovenox 40 mg SC daily   Code: Full code  Diet: Regular adult diet  PT/OT/SW consulted    Doc Garcia MD  11/15/2022  8:33 AM      Attending Physician Statement  I have discussed the care of Barrett Briseno and I have examined the patient myselft and taken ros and hpi , including pertinent history and exam findings,  with the resident. I have reviewed the key elements of all parts of the encounter with the resident. I agree with the assessment, plan and orders as documented by the resident. Critical care time spent 37 minutes in reviewing data/medicines/talking to patient/family,  explaining and answering all the questions.     Acute on chronic hypoxic and hypercapnic respiratory failure,  COPD exacerbation,  Multifocal pneumonia, TB ruled out,  Noncompliance,  Current smoker,  60-pack-year history of smoking,  Hypokalemia, next hyperlipidemia,  Hypertension,      Electronically signed by Maria Antonia Bautista MD

## 2022-11-15 NOTE — PROGRESS NOTES
Pulmonary Progress Note  [facility]      Patient - Jair Mojica,  Age - 61 y.o.    - 1965      Room Number -    MRN -  [id number]   Acct # - [de-identified]  Date of Admission -  2022 11:16 AM        Destiny Walters MD  Primary Care Physician - Keyana Black, DTR     SUBJECTIVE   Patient claims she is doing okay. Currently on 4 L nasal cannula. OBJECTIVE   VITALS    height is 4' 11\" (1.499 m) and weight is 129 lb 3 oz (58.6 kg). Her oral temperature is 97.7 °F (36.5 °C). Her blood pressure is 168/65 (abnormal) and her pulse is 77. Her respiration is 23 and oxygen saturation is 93%. Body mass index is 26.09 kg/m². Temperature Range: Temp: 97.7 °F (36.5 °C) Temp  Av.2 °F (36.8 °C)  Min: 97.7 °F (36.5 °C)  Max: 98.6 °F (37 °C)  BP Range:  Systolic (33GPW), SIZ:165 , Min:138 , ISE:373     Diastolic (55GNE), BAB:14, Min:46, Max:84    Pulse Range: Pulse  Av.1  Min: 63  Max: 88  Respiration Range: Resp  Av.8  Min: 11  Max: 28  Current Pulse Ox[de-identified]  SpO2: 93 %  24HR Pulse Ox Range:  SpO2  Av.6 %  Min: 87 %  Max: 96 %  Oxygen Amount and Delivery: O2 Flow Rate (L/min): 4 L/min    Wt Readings from Last 3 Encounters:   22 129 lb 3 oz (58.6 kg)   19 162 lb 14.7 oz (73.9 kg)   18 150 lb (68 kg)       I/O (24 Hours)    Intake/Output Summary (Last 24 hours) at 11/15/2022 1338  Last data filed at 11/15/2022 0451  Gross per 24 hour   Intake 2218.93 ml   Output --   Net 2218.93 ml       EXAM     General Appearance  Awake, alert, oriented, in no acute distress  HEENT - normocephalic, atraumatic. Neck - Supple,  trachea midline   Lungs -coarse breath sounds no crackles rales or wheezes. Heart Exam:PMI normal. No lifts, heaves, or thrills. RRR.  No murmurs, clicks, gallops, or rubs  Abdomen Exam: Abdomen soft, non-tender  Extremity Exam: No signs of cyanosis    MEDS      hydrALAZINE  50 mg Oral 3 times per day    methylPREDNISolone  30 mg IntraVENous Q8H    hydroCHLOROthiazide  25 mg Oral Daily    insulin lispro  0-8 Units SubCUTAneous TID     insulin lispro  0-4 Units SubCUTAneous Nightly    fluticasone  1 spray Each Nostril Daily    lisinopril  40 mg Oral Daily    dilTIAZem  30 mg Oral 4 times per day    ipratropium-albuterol  1 ampule Inhalation Q4H    doxycycline monohydrate  100 mg Oral 2 times per day    sodium chloride flush  5-40 mL IntraVENous 2 times per day    enoxaparin  40 mg SubCUTAneous Daily    atorvastatin  40 mg Oral Daily    montelukast  10 mg Oral Nightly    benzonatate  200 mg Oral TID    dextromethorphan  60 mg Oral 2 times per day    nicotine  1 patch TransDERmal Daily      dextrose      sodium chloride       LORazepam, potassium chloride **OR** potassium alternative oral replacement **OR** potassium chloride, hydrALAZINE, sodium phosphate IVPB **OR** sodium phosphate IVPB **OR** sodium phosphate IVPB, glucose, dextrose bolus **OR** dextrose bolus, glucagon (rDNA), dextrose, perflutren lipid microspheres, sodium chloride flush, sodium chloride flush, sodium chloride, ondansetron **OR** ondansetron, polyethylene glycol, acetaminophen **OR** acetaminophen    LABS   CBC   Recent Labs     11/15/22  0430   WBC 15.4*   HGB 15.3   HCT 46.0   MCV 89.9        BMP:   Lab Results   Component Value Date/Time     11/15/2022 04:30 AM    K 3.3 11/15/2022 04:30 AM    CL 92 11/15/2022 04:30 AM    CO2 35 11/15/2022 04:30 AM    BUN 14 11/15/2022 04:30 AM    LABALBU 3.3 11/12/2022 04:17 AM    CREATININE <0.40 11/15/2022 04:30 AM    CALCIUM 9.1 11/15/2022 04:30 AM    GFRAA >60 11/01/2019 05:24 AM    LABGLOM Can not be calculated 11/15/2022 04:30 AM     ABGs:  Lab Results   Component Value Date/Time    PHART 7.325 11/10/2022 05:52 PM    PO2ART 70.6 11/10/2022 05:52 PM    NVL2TER 81.2 11/10/2022 05:52 PM      Lab Results   Component Value Date/Time    MODE BIPAP 11/10/2022 05:52 PM     Ionized Calcium: No results found for: IONCA  Magnesium:    Lab Results   Component Value Date/Time    MG 2.2 11/15/2022 04:30 AM     Phosphorus:    Lab Results   Component Value Date/Time    PHOS 3.6 11/15/2022 04:30 AM        LIVER PROFILE No results for input(s): AST, ALT, LIPASE, BILIDIR, BILITOT, ALKPHOS in the last 72 hours. Invalid input(s): AMYLASE,  ALB  INR No results for input(s): INR in the last 72 hours. PTT   Lab Results   Component Value Date    APTT 30.0 11/08/2022         RADIOLOGY     (See actual reports for details)    ASSESSMENT/PLAN     Patient Active Problem List   Diagnosis    Acute respiratory failure with hypoxia and hypercapnia (HCC)    Pneumonia    Acute exacerbation of chronic obstructive pulmonary disease (COPD) (HCC)    Tobacco abuse    Chronic respiratory failure (HCC)    COPD exacerbation (HCC)    Hyponatremia    Influenza    Chronic obstructive pulmonary disease ([facility] [address]    Acute respiratory failure (HCC)     Acute hypoxic respiratory failure  Chronic respiratory failure with severe hypercapnia. PCO2 in the 60s with normal pH. Multifocal pneumonia-most likely atypical/adenovirus  Acute exacerbation COPD  Poorly controlled hypertension  Hyponatremia-most likely SIADH from underlying pulmonary issues  Chronic hypercapnic respiratory failure  History of tobacco abuse  Medical noncompliance  Hypertension. Full code. On Lovenox for DVT prophylaxis.   On broad-spectrum antibiotics, will check procalcitonin level, if normal, will can de-escalate antibiotics  On steroids  Weaning O2 down as tolerated    Progressive unit okay  Electronically signed by Colten Pederson MD on 11/15/2022 at 1:38 PM

## 2022-11-16 VITALS
HEIGHT: 59 IN | WEIGHT: 134.26 LBS | SYSTOLIC BLOOD PRESSURE: 171 MMHG | HEART RATE: 83 BPM | DIASTOLIC BLOOD PRESSURE: 79 MMHG | BODY MASS INDEX: 27.07 KG/M2 | OXYGEN SATURATION: 91 % | RESPIRATION RATE: 18 BRPM | TEMPERATURE: 98.6 F

## 2022-11-16 LAB
ANION GAP SERPL CALCULATED.3IONS-SCNC: 7 MMOL/L (ref 9–17)
BUN BLDV-MCNC: 18 MG/DL (ref 6–20)
CALCIUM SERPL-MCNC: 9.2 MG/DL (ref 8.6–10.4)
CHLORIDE BLD-SCNC: 87 MMOL/L (ref 98–107)
CO2: 36 MMOL/L (ref 20–31)
CREAT SERPL-MCNC: 0.4 MG/DL (ref 0.5–0.9)
GFR SERPL CREATININE-BSD FRML MDRD: >60 ML/MIN/1.73M2
GLUCOSE BLD-MCNC: 125 MG/DL (ref 65–105)
GLUCOSE BLD-MCNC: 129 MG/DL (ref 70–99)
GLUCOSE BLD-MCNC: 139 MG/DL (ref 65–105)
GLUCOSE BLD-MCNC: 155 MG/DL (ref 65–105)
HCT VFR BLD CALC: 45.7 % (ref 36–46)
HEMOGLOBIN: 15.1 G/DL (ref 12–16)
MAGNESIUM: 2.3 MG/DL (ref 1.6–2.6)
MCH RBC QN AUTO: 29.3 PG (ref 26–34)
MCHC RBC AUTO-ENTMCNC: 32.9 G/DL (ref 31–37)
MCV RBC AUTO: 88.9 FL (ref 80–100)
PDW BLD-RTO: 13.8 % (ref 11.5–14.9)
PHOSPHORUS: 3.5 MG/DL (ref 2.6–4.5)
PLATELET # BLD: 216 K/UL (ref 150–450)
PMV BLD AUTO: 8.2 FL (ref 6–12)
POTASSIUM SERPL-SCNC: 3.6 MMOL/L (ref 3.7–5.3)
RBC # BLD: 5.14 M/UL (ref 4–5.2)
SODIUM BLD-SCNC: 130 MMOL/L (ref 135–144)
WBC # BLD: 17.3 K/UL (ref 3.5–11)

## 2022-11-16 PROCEDURE — 36415 COLL VENOUS BLD VENIPUNCTURE: CPT

## 2022-11-16 PROCEDURE — 85027 COMPLETE CBC AUTOMATED: CPT

## 2022-11-16 PROCEDURE — 6360000002 HC RX W HCPCS: Performed by: STUDENT IN AN ORGANIZED HEALTH CARE EDUCATION/TRAINING PROGRAM

## 2022-11-16 PROCEDURE — 6370000000 HC RX 637 (ALT 250 FOR IP): Performed by: INTERNAL MEDICINE

## 2022-11-16 PROCEDURE — 6370000000 HC RX 637 (ALT 250 FOR IP)

## 2022-11-16 PROCEDURE — 83735 ASSAY OF MAGNESIUM: CPT

## 2022-11-16 PROCEDURE — 94660 CPAP INITIATION&MGMT: CPT

## 2022-11-16 PROCEDURE — 2700000000 HC OXYGEN THERAPY PER DAY

## 2022-11-16 PROCEDURE — 2580000003 HC RX 258: Performed by: INTERNAL MEDICINE

## 2022-11-16 PROCEDURE — 6360000002 HC RX W HCPCS: Performed by: INTERNAL MEDICINE

## 2022-11-16 PROCEDURE — 99232 SBSQ HOSP IP/OBS MODERATE 35: CPT | Performed by: INTERNAL MEDICINE

## 2022-11-16 PROCEDURE — 6370000000 HC RX 637 (ALT 250 FOR IP): Performed by: NURSE PRACTITIONER

## 2022-11-16 PROCEDURE — 84100 ASSAY OF PHOSPHORUS: CPT

## 2022-11-16 PROCEDURE — 94761 N-INVAS EAR/PLS OXIMETRY MLT: CPT

## 2022-11-16 PROCEDURE — 94640 AIRWAY INHALATION TREATMENT: CPT

## 2022-11-16 PROCEDURE — 6370000000 HC RX 637 (ALT 250 FOR IP): Performed by: STUDENT IN AN ORGANIZED HEALTH CARE EDUCATION/TRAINING PROGRAM

## 2022-11-16 PROCEDURE — 82947 ASSAY GLUCOSE BLOOD QUANT: CPT

## 2022-11-16 PROCEDURE — 6360000002 HC RX W HCPCS: Performed by: NURSE PRACTITIONER

## 2022-11-16 PROCEDURE — 80048 BASIC METABOLIC PNL TOTAL CA: CPT

## 2022-11-16 RX ORDER — NICOTINE 21 MG/24HR
1 PATCH, TRANSDERMAL 24 HOURS TRANSDERMAL DAILY
Qty: 30 PATCH | Refills: 3 | Status: SHIPPED | OUTPATIENT
Start: 2022-11-17

## 2022-11-16 RX ORDER — PREDNISONE 10 MG/1
10 TABLET ORAL DAILY
Status: DISCONTINUED | OUTPATIENT
Start: 2022-11-16 | End: 2022-11-17 | Stop reason: HOSPADM

## 2022-11-16 RX ORDER — DOXYCYCLINE 100 MG/1
100 CAPSULE ORAL EVERY 12 HOURS SCHEDULED
Qty: 3 CAPSULE | Refills: 0 | Status: SHIPPED | OUTPATIENT
Start: 2022-11-16 | End: 2022-11-18

## 2022-11-16 RX ORDER — PREDNISONE 10 MG/1
TABLET ORAL
Qty: 31 TABLET | Refills: 0 | Status: SHIPPED | OUTPATIENT
Start: 2022-11-16 | End: 2022-11-29

## 2022-11-16 RX ADMIN — DILTIAZEM HYDROCHLORIDE 30 MG: 30 TABLET, FILM COATED ORAL at 05:20

## 2022-11-16 RX ADMIN — PREDNISONE 10 MG: 10 TABLET ORAL at 16:09

## 2022-11-16 RX ADMIN — IPRATROPIUM BROMIDE AND ALBUTEROL SULFATE 1 AMPULE: 2.5; .5 SOLUTION RESPIRATORY (INHALATION) at 15:34

## 2022-11-16 RX ADMIN — IPRATROPIUM BROMIDE AND ALBUTEROL SULFATE 1 AMPULE: 2.5; .5 SOLUTION RESPIRATORY (INHALATION) at 12:21

## 2022-11-16 RX ADMIN — DILTIAZEM HYDROCHLORIDE 30 MG: 30 TABLET, FILM COATED ORAL at 17:55

## 2022-11-16 RX ADMIN — BENZONATATE 200 MG: 200 CAPSULE ORAL at 16:09

## 2022-11-16 RX ADMIN — LISINOPRIL 40 MG: 20 TABLET ORAL at 08:49

## 2022-11-16 RX ADMIN — METHYLPREDNISOLONE SODIUM SUCCINATE 30 MG: 40 INJECTION, POWDER, FOR SOLUTION INTRAMUSCULAR; INTRAVENOUS at 00:49

## 2022-11-16 RX ADMIN — HYDROCHLOROTHIAZIDE 25 MG: 25 TABLET ORAL at 08:49

## 2022-11-16 RX ADMIN — SODIUM CHLORIDE, PRESERVATIVE FREE 10 ML: 5 INJECTION INTRAVENOUS at 09:01

## 2022-11-16 RX ADMIN — METHYLPREDNISOLONE SODIUM SUCCINATE 30 MG: 40 INJECTION, POWDER, FOR SOLUTION INTRAMUSCULAR; INTRAVENOUS at 08:50

## 2022-11-16 RX ADMIN — IPRATROPIUM BROMIDE AND ALBUTEROL SULFATE 1 AMPULE: 2.5; .5 SOLUTION RESPIRATORY (INHALATION) at 08:15

## 2022-11-16 RX ADMIN — IPRATROPIUM BROMIDE AND ALBUTEROL SULFATE 1 AMPULE: 2.5; .5 SOLUTION RESPIRATORY (INHALATION) at 00:37

## 2022-11-16 RX ADMIN — FLUTICASONE PROPIONATE 1 SPRAY: 50 SPRAY, METERED NASAL at 13:19

## 2022-11-16 RX ADMIN — HYDRALAZINE HYDROCHLORIDE 10 MG: 20 INJECTION INTRAMUSCULAR; INTRAVENOUS at 00:48

## 2022-11-16 RX ADMIN — ENOXAPARIN SODIUM 40 MG: 100 INJECTION SUBCUTANEOUS at 08:49

## 2022-11-16 RX ADMIN — DOXYCYCLINE 100 MG: 100 CAPSULE ORAL at 18:29

## 2022-11-16 RX ADMIN — DILTIAZEM HYDROCHLORIDE 30 MG: 30 TABLET, FILM COATED ORAL at 00:49

## 2022-11-16 RX ADMIN — DOXYCYCLINE 100 MG: 100 CAPSULE ORAL at 08:49

## 2022-11-16 RX ADMIN — Medication 60 MG: at 08:49

## 2022-11-16 RX ADMIN — HYDRALAZINE HYDROCHLORIDE 50 MG: 50 TABLET, FILM COATED ORAL at 05:20

## 2022-11-16 RX ADMIN — ATORVASTATIN CALCIUM 40 MG: 40 TABLET, FILM COATED ORAL at 08:49

## 2022-11-16 RX ADMIN — BENZONATATE 200 MG: 200 CAPSULE ORAL at 08:49

## 2022-11-16 RX ADMIN — HYDRALAZINE HYDROCHLORIDE 50 MG: 50 TABLET, FILM COATED ORAL at 16:09

## 2022-11-16 ASSESSMENT — ENCOUNTER SYMPTOMS
NAUSEA: 0
SHORTNESS OF BREATH: 1
SORE THROAT: 0
VOMITING: 0
ABDOMINAL PAIN: 0
RHINORRHEA: 0
COUGH: 1

## 2022-11-16 NOTE — CARE COORDINATION
[address] Encounter Date/Time: 2022 [facility]    Hospital Account: [de-identified]    MRN: [id number]    Patient: Jens Crawford    Contact Serial #: [id number]      ENCOUNTER          Patient Class: I Private Enc? No Unit RM BD: [facility] PROG    Hospital Service: MED   Encounter DX: Acute respiratory failur*   ADM Provider: Moe Lynn MD   Procedure:     ATT Provider: Moe Lynn MD   REF Provider:        Admission DX: Acute respiratory failure ([address] COPD exacerbation ([address] Acute respiratory failure with hypoxia ([address] Pneumonia of both lungs due to infectious organism, unspecified part of lung and DX codes: J96.00, J44.1, J96.01, J18.9      PATIENT                 Name: Jens Crawford : 1965 (57 yrs)   Address: [address] [place] [address] Sex: Female   [facility] [address]         Marital Status: Legally    Employer: NOT EMPLOYED         Advent: None   Primary Care Provider: All Molina DTR         Primary Phone: [phone]   EMERGENCY CONTACT   Contact Name Legal Guardian? Relationship to Patient Home Phone Work Phone   1. Cleo Gómez  2. Ashley Swift      Child  Other [phone]              GUARANTOR            Guarantor: Jens Crawford     : 1965   Address: [address] [place] [address] Sex: [place] [address]     Relation to Patient: Self       Home Phone: [phone]   Guarantor ID: [id number]       Work Phone:     Guarantor Employer: NOT EMPLOYED         Status: NOT EMPLO*      COVERAGE        PRIMARY INSURANCE   Payor: [address] [place] [address] Drive: [address] [place] [address] Address: [place] [address]       Group Number:   Insurance Type: [id number] Name: Sorin Anderson : 1965   Subscriber ID: [id number] Pat. Rel. to Sub: Self   SECONDARY INSURANCE   Payor:   Plan:     Payor Address:  ,           Group Number:   Insurance Type:     Subscriber Name:   Subscriber :     Subscriber ID:   Pat. Rel. to Sub:           CSN: [id number]                  Brionna Vanessa RCP   Respiratory Therapist   Specialty:  Respiratory Therapy   Progress Notes       Signed   Date of Service:  2022 12:22 PM                 Signed                            Home Oxygen Evaluation     Room air SpO2 at Rest = 85%     Room air with exercise/exertion = N/A     SpO2 on prescribed O2 level at  2  LPM  at rest = 92%     with exercise/exertion = 3L 90%     Nocturnal Oximetry with patient on room air recommended if the resting SpO2 is 89% to 95%. (Requires additional order)              EDWARD Pires - CNP   Nurse Practitioner   Pulmonology   Progress Notes       Addendum   Date of Service:  2022  9:42 AM                 Expand All Collapse All                                                                                                                                                                                                                                                                                                                    [place] BURCH/Sharad Torres MD/ Dr. Hargrove Back  Dr. Kassidy Pinto AGACNP-BC, NP-C      Isatu Velasquez APRN NP-C          [id number] [employer] APRN - NP-C                                      Critical Care / Pulmonary Progress Note     [facility]   Age - 61 y.[facility].   - 1965  MRN - [id number]  Acct # - [de-identified]  Date of Admission - 2022 11:16 AM     Consulting Service/Physician:         Primary Care Physician: Mary Simental DTR     SUBJECTIVE:      Chief Complaint:       Chief Complaint   Patient presents with    Dizziness    Cough    Shortness of Breath      Subjective:     She had declined BiPAP overnight. She is on 3 L nasal cannula. Normally does not wear oxygen at home. Blood pressure has been elevated. She tells me she has plans for possible discharge today.   She feels ready to go home. VITALS  /67   Pulse 83   Temp 97.1 °F (36.2 °C) (Oral)   Resp 18   Ht 4' 11\" (1.499 m)   Wt 134 lb 4.2 oz (60.9 kg)   SpO2 92%   BMI 27.12 kg/m²       Wt Readings from Last 3 Encounters:   11/15/22 134 lb 4.2 oz (60.9 kg)   11/01/19 162 lb 14.7 oz (73.9 kg)   07/05/18 150 lb (68 kg)      I/O (24 Hours)     Intake/Output Summary (Last 24 hours) at 11/16/2022 0942  Last data filed at 11/16/2022 0931      Gross per 24 hour   Intake 873.6 ml   Output --   Net 873.6 ml      Ventilator:   Settings  FiO2 : 40 %  PEEP/CPAP (cm H2O): 40 cm H20  Insp Rise Time (%): 2 %  Exam:   Physical Exam   Constitutional:  Oriented to person, place, and time. Appears well-developed and well-nourished. HENT: Unremarkable  Head: Normocephalic and atraumatic. Eyes: EOM are normal. Pupils are equal, round, and reactive to light. Neck: Neck supple. Cardiovascular:  Regular rate and rhythm. Normal heart tones. No JVD. Pulmonary/Chest: Effort normal and breath sounds are slightly diminished with few expiratory wheezes  Abdominal: Soft. Bowel sounds are normal. There is no tenderness. Musculoskeletal: Normal range of motion. She exhibits no edema or tenderness. Neurological:patient is alert and oriented to person, place, and time. Skin: Skin is warm and dry. No rash noted.    Extremities: No edema or discoloration  Infusions:      Infusions Meds    dextrose      sodium chloride           Meds:     Current Medication      Current Facility-Administered Medications:     hydrALAZINE (APRESOLINE) tablet 50 mg, 50 mg, Oral, 3 times per day, Yola Canchola MD, 50 mg at 11/16/22 0520    methylPREDNISolone sodium (SOLU-MEDROL) injection 30 mg, 30 mg, IntraVENous, Q8H, Yola Canchola MD, 30 mg at 11/16/22 0850    LORazepam (ATIVAN) tablet 0.5 mg, 0.5 mg, Oral, PRN, Angelica Esquivel MD, 0.5 mg at 11/15/22 0815    hydroCHLOROthiazide (HYDRODIURIL) tablet 25 mg, 25 mg, Oral, Daily, Yola Canchola MD, 25 mg at 11/16/22 0849    insulin lispro (HUMALOG) injection vial 0-8 Units, 0-8 Units, SubCUTAneous, TID WC, Edison Tirado MD, 2 Units at 11/13/22 0911    insulin lispro (HUMALOG) injection vial 0-4 Units, 0-4 Units, SubCUTAneous, Nightly, Edison Tirado MD    potassium chloride (KLOR-CON M) extended release tablet 40 mEq, 40 mEq, Oral, PRN, 40 mEq at 11/15/22 0652 **OR** potassium bicarb-citric acid (EFFER-K) effervescent tablet 40 mEq, 40 mEq, Oral, PRN **OR** potassium chloride 10 mEq/100 mL IVPB (Peripheral Line), 10 mEq, IntraVENous, PRN, Edison Tiraod MD    fluticasone (FLONASE) 50 MCG/ACT nasal spray 1 spray, 1 spray, Each Nostril, Daily, Salima Womack MD, 1 spray at 11/15/22 0815    lisinopril (PRINIVIL;ZESTRIL) tablet 40 mg, 40 mg, Oral, Daily, Jose Metz MD, 40 mg at 11/16/22 0849    dilTIAZem (CARDIZEM) tablet 30 mg, 30 mg, Oral, 4 times per day, Jose Metz MD, 30 mg at 11/16/22 0520    hydrALAZINE (APRESOLINE) injection 10 mg, 10 mg, IntraVENous, Q6H PRN, Peña Rivas, APRN - CNP, 10 mg at 11/16/22 0048    sodium phosphate 10 mmol in sodium chloride 0.9 % 250 mL IVPB, 10 mmol, IntraVENous, PRN, Stopped at 11/13/22 1150 **OR** sodium phosphate 15 mmol in sodium chloride 0.9 % 250 mL IVPB, 15 mmol, IntraVENous, PRN **OR** sodium phosphate 20 mmol in sodium chloride 0.9 % 500 mL IVPB, 20 mmol, IntraVENous, PRN, Salima Womack MD, Stopped at 11/11/22 2101    glucose chewable tablet 16 g, 4 tablet, Oral, PRN, Galina Ballesteros MD    dextrose bolus 10% 125 mL, 125 mL, IntraVENous, PRN **OR** dextrose bolus 10% 250 mL, 250 mL, IntraVENous, PRN, Galina Ballesteros MD    glucagon (rDNA) injection 1 mg, 1 mg, SubCUTAneous, PRN, Galina Ballesteros MD    dextrose 10 % infusion, , IntraVENous, Continuous PRN, Galina Ballesteros MD    ipratropium-albuterol (DUONEB) nebulizer solution 1 ampule, 1 ampule, Inhalation, Q4H, Sofi Campos MD, 1 ampule at 11/16/22 0815    perflutren lipid microspheres (DEFINITY) injection 1.5 mL, 1.5 mL, IntraVENous, ONCE PRN, Jessica Wiseman MD    doxycycline monohydrate (MONODOX) capsule 100 mg, 100 mg, Oral, 2 times per day, Alfred Diaz MD, 100 mg at 11/16/22 0849    sodium chloride flush 0.9 % injection 10 mL, 10 mL, IntraVENous, PRN, Rubens Guan DO, 10 mL at 11/08/22 1245    sodium chloride flush 0.9 % injection 5-40 mL, 5-40 mL, IntraVENous, 2 times per day, Jose Quach MD, 10 mL at 11/16/22 0901    sodium chloride flush 0.9 % injection 5-40 mL, 5-40 mL, IntraVENous, PRN, Jose Quach MD    0.9 % sodium chloride infusion, 25 mL, IntraVENous, PRN, Jose Quach MD    enoxaparin (LOVENOX) injection 40 mg, 40 mg, SubCUTAneous, Daily, Jose Quach MD, 40 mg at 11/16/22 0849    ondansetron (ZOFRAN-ODT) disintegrating tablet 4 mg, 4 mg, Oral, Q8H PRN **OR** ondansetron (ZOFRAN) injection 4 mg, 4 mg, IntraVENous, Q6H PRN, Jose Quach MD, 4 mg at 11/15/22 1644    polyethylene glycol (GLYCOLAX) packet 17 g, 17 g, Oral, Daily PRN, Jose Quach MD    acetaminophen (TYLENOL) tablet 650 mg, 650 mg, Oral, Q6H PRN **OR** acetaminophen (TYLENOL) suppository 650 mg, 650 mg, Rectal, Q6H PRN, Jose Quach MD    atorvastatin (LIPITOR) tablet 40 mg, 40 mg, Oral, Daily, Jose Quach MD, 40 mg at 11/16/22 0849    montelukast (SINGULAIR) tablet 10 mg, 10 mg, Oral, Nightly, Jose Quach MD, 10 mg at 11/15/22 2141    benzonatate (TESSALON) capsule 200 mg, 200 mg, Oral, TID, Xavier Cox MD, 200 mg at 11/16/22 0849    dextromethorphan (DELSYM) 30 MG/5ML extended release liquid 60 mg, 60 mg, Oral, 2 times per day, Xavier Cox MD, 60 mg at 11/16/22 0849    nicotine (NICODERM CQ) 21 MG/24HR 1 patch, 1 patch, TransDERmal, Daily, Xavier Cox MD, 1 patch at 11/16/22 0852        Lab Results:      [unfilled]        Lab Results   Component Value Date     WBC 17.3 (H) 11/16/2022     HGB 15.1 11/16/2022     HCT 45.7 11/16/2022     MCV 88.9 11/16/2022      11/16/2022            Lab Results   Component Value Date     CALCIUM 9.2 11/16/2022      (L) 11/16/2022     K 3.6 (L) 11/16/2022     CO2 36 (H) 11/16/2022     CL 87 (L) 11/16/2022     BUN 18 11/16/2022     CREATININE 0.40 (L) 11/16/2022      No components found for: ABGSAMPLETYP, ABGBODYTEMP, ABGPHCORRFOR, [id number], ABGPHCORRFOOR, ABGPH, ABGPCO2, ABGPO2, ABGBASEEXCES, ABGBASEDEFIC, ABGHCO3, KCYY9KCF, ABGENDTIDALC, ABGALLENSTES, ABGSPO2, ABGSAMEPLESIT, [id number], ABGOXYGENSOU        Lab Results   Component Value Date     INR 1.0 11/08/2022     PROTIME 13.1 11/08/2022         Radiology:         ASSESSMENT:   Principal Problem:    Acute respiratory failure (HCC)  Active Problems:    Chronic obstructive pulmonary disease ([facility] [address]  Resolved Problems:    * No resolved hospital problems. *        Acute hypoxic respiratory failure-on 3 L normally not on oxygen  Chronic respiratory failure with severe hypercapnia. PCO2 in the 60s with normal pH. Multifocal pneumonia-most likely atypical/adenovirus  Acute exacerbation COPD  Poorly controlled hypertension  Hyponatremia-most likely SIADH from underlying pulmonary issues-130  Chronic hypercapnic respiratory failure  History of tobacco abuse  Medical noncompliance  Hypertension. Full code. PLAN:   Blood pressure management to primary service  Patient's procalcitonin was 0.03 yesterday  Home O2 eval  Patient will need outpatient follow-up in our office in 4 to 6 weeks [phone]  If patient is discharged will need another 3 doses of doxycycline  Recommend prednisone taper at time of discharge transition over to oral steroids here  I spoke to charge nurse     Face-to-face valuation is performed. Patient will use an benefit from oxygen therapy. She requires 2 L at rest and 3 L with exertion. her pulse ox was 85% at rest.        Electronically signed by EDWARD Mcmahan CNP on 11/16/22      This progress note was completed using a voice transcription system.  Every effort was made to ensure accuracy. However, inadvertent computerized transcription errors may be present. [facility] Pulmonary, Critical Care & Sleep         Revision History                            [id number]        CSN                                    Req/Control # [Problem retrieving Specimen ID]                                   Order Date:  2022  [id number]                                          Patient Information      Name:  Pao Calvo  :  1965  Age:  61 y.o. Address:  [address] [place] [address]   Zip:  [address]  PCP: Kimi Bartlett DTR Sex:  F  SSN: [id number]  Home Phone: [phone]  Work Phone:    Patient MRN:  [id number]    Alt Patient ID:  [id number]  PCP Phone: [phone]       Authorizing Provider Information       AUTHORIZING PROVIDER: EDWARD Marroquin CNP  Physician ID: [id number]  NPI:  [id number]  Site:   Address: [address] [place] [address] [place]: [place], [address] [place] [address]  Phone: [phone]  Fax:              [phone] [place]  DME Order for Home Oxygen as OP [[id number]] (ORD   #:   [id number]) Priority  Routine Class  Hospital Performed        Associated Diagnosis:          Comments: You must complete the order parameters below and add the medical necessity documentation for this DME in a separate note.      Stationary Oxygen Concentrator at 2 lpm via Nasal Cannula     Stationary Prescribed at:  Continuous     Portable Gaseous O2 System and contents at 3 lpm via Nasal Cannula     Non Applicable     Diagnosis: influenza, pneumonia, copd  Length of need: 12 Months            Scheduling Instructions:                                 Specimen Source             Collection Date    Collection Time    Order Status    Expected Date                 Electronically Signed By  EDWARD Marroquin CNP  NPI:  [id number] Date  2022  3:17 PM              Responsible Party Elham Patel     Guar-ActID   Relationship Account Type Home Phone   Jayshree Laurent [phone] [facility], [address] Self P/F [phone]   Employer   Work Phone   NOT EMPLOYED , [address] Information     Primary Insurance  Insurance/Subscriber ID:  [id number]  [address] Name:  Derrick Griffith DAT              Relationship to Patient: SelfSigned ABN: N    Payor Name:  [address] [place] [address] [address]   Plan:  Aleyda Weaver 34 PLAN   Group:   Worker's Comp Date of Injury:

## 2022-11-16 NOTE — PROGRESS NOTES
[facility] -   Infectious diseases evaluation  admission date 11/8/2022    reason for consultation:   Atypical pneumonia    Impression :   Current:  Adenovirus respiratory infection with possible superimposed bacterial infection. Acute COPD exacerbation  Hyponatremia  Chronic hypercapnic respiratory failure  History of smoking    Recommendations   QuantiFERON-TB test was negative  P.o. doxycycline last doses today  Nasal swab for MRSA was negative  Procalcitonin level was 0.1 and lactic acid was 0.8 on 11/10/2022  Mycoplasma IgM negative  Legionella urine antigen negative  Normal valdez growth on respiratory culture  HIV screen negative  Respiratory panel was positive for adenovirus PCR  Continue supportive care    Infection Control Recommendations   [place] Precautions  Droplet Isolation      Antimicrobial Stewardship Recommendations   Simplification of therapy  Targeted therapy      History of Present Illness:   Initial history:  Whitley Rosa is a 61y.o.-year-old female with history of smoking, COPD, chronic hypercapnic respiratory failure, noncompliant with home medication. She presented to hospital with worsening shortness of breath associated with cough productive of green phlegm for several days, symptoms moderate to severe. CT chest was negative for pulmonary embolism but showed multiple reticular nodule infiltrates in the upper lobes right more than left. Respiratory panel was positive for adenovirus PCR. The patient denied sick contact, no recent travel, denied hemoptysis, no chronic fatigue or night sweats. She is actively smoking, denied alcohol or drug abuse.     Interval changes  11/16/2022   She is improving , denied fever or chills, no new complaints    Patient Vitals for the past 8 hrs:   BP Temp Temp src Pulse Resp SpO2   11/16/22 1545 (!) 171/79 -- -- -- -- --   11/16/22 1534 -- -- -- 83 18 91 %   11/16/22 1300 (!) 125/53 -- -- -- -- -- 22 1230 (!) 97/48 98.6 °F (37 °C) Oral 73 16 92 %   22 1221 -- -- -- 91 18 92 %               I have personally reviewed the past medical history, past surgical history, medications, social history, and family history, and I haveupdated the database accordingly. Allergies:   Patient has no known allergies. Review of Systems:     Review of Systems  As per history of present illness, other than above 12 system review was negative  Physical Examination :       Physical Exam  HENT:      Right Ear: External ear normal.      Left Ear: External ear normal.      Mouth/Throat:      Pharynx: No oropharyngeal exudate. Cardiovascular:      Rate and Rhythm: Normal rate and regular rhythm. Pulmonary:      Effort: Pulmonary effort is normal.      Breath sounds: No wheezing or rhonchi. Abdominal:      General: There is no distension. Palpations: Abdomen is soft. Tenderness: There is no abdominal tenderness. Musculoskeletal:      Right lower leg: No edema. Left lower leg: No edema. Skin:     General: Skin is warm. Coloration: Skin is not jaundiced.        Past Medical History:     Past Medical History:   Diagnosis Date    Acute respiratory failure ([address]     due to COPD exacerbation    COPD (chronic obstructive pulmonary disease) ([address]     with hypoxia    Cough     Current every day smoker     Dyspnea     Heart murmur     as a child    Hypertension     Shortness of breath        Past Surgical  History:     Past Surgical History:   Procedure Laterality Date     SECTION         Medications:      predniSONE  10 mg Oral Daily    hydrALAZINE  50 mg Oral 3 times per day    hydroCHLOROthiazide  25 mg Oral Daily    insulin lispro  0-8 Units SubCUTAneous TID WC    insulin lispro  0-4 Units SubCUTAneous Nightly    fluticasone  1 spray Each Nostril Daily    lisinopril  40 mg Oral Daily    dilTIAZem  30 mg Oral 4 times per day    ipratropium-albuterol  1 ampule Inhalation Q4H doxycycline monohydrate  100 mg Oral 2 times per day    sodium chloride flush  5-40 mL IntraVENous 2 times per day    enoxaparin  40 mg SubCUTAneous Daily    atorvastatin  40 mg Oral Daily    montelukast  10 mg Oral Nightly    benzonatate  200 mg Oral TID    dextromethorphan  60 mg Oral 2 times per day    nicotine  1 patch TransDERmal Daily       Social History:     Social History     Socioeconomic History    Marital status: Legally      Spouse name: Not on file    Number of children: Not on file    Years of education: Not on file    Highest education level: Not on file   Occupational History    Not on file   Tobacco Use    Smoking status: Every Day     Packs/day: 0.50     Types: Cigarettes    Smokeless tobacco: Never   Vaping Use    Vaping Use: Some days   Substance and Sexual Activity    Alcohol use: No    Drug use: No    Sexual activity: Not on file   Other Topics Concern    Not on file   Social History Narrative    Not on file     Social Determinants of Health     Financial Resource Strain: Not on file   Food Insecurity: Not on file   Transportation Needs: Not on file   Physical Activity: Not on file   Stress: Not on file   Social Connections: Not on file   Intimate Partner Violence: Not on file   Housing Stability: Not on file       Family History:   History reviewed. No pertinent family history. Medical Decision Making:   I have independently reviewed/ordered the following labs:    CBC with Differential:   Recent Labs     11/15/22  0430 11/16/22  0436   WBC 15.4* 17.3*   HGB 15.3 15.1   HCT 46.0 45.7    216       BMP:  Recent Labs     11/15/22  0430 11/16/22  0436   * 130*   K 3.3* 3.6*   CL 92* 87*   CO2 35* 36*   BUN 14 18   CREATININE <0.40* 0.40*   MG 2.2 2.3       Hepatic Function Panel: No results for input(s): PROT, LABALBU, BILIDIR, IBILI, BILITOT, ALKPHOS, ALT, AST in the last 72 hours. No results for input(s): RPR in the last 72 hours.   No results for input(s): HIV in the last 72 hours. No results for input(s): BC in the last 72 hours. Lab Results   Component Value Date/Time    CREATININE 0.40 11/16/2022 04:36 AM    GLUCOSE 129 11/16/2022 04:36 AM       Detailed results: Thank you for allowing us to participate in the care of this patient. Please call with questions. This note is created with the assistance of a speech recognition program.  While intending to generate adocument that actually reflects the content of the visit, the document can still have some errors including those of syntax and sound a like substitutions which may escape proof reading. It such instances, actual meaningcan be extrapolated by contextual diversion.     Cuca Mccullough MD  Office: [phone]  Perfect serve / office [phone]

## 2022-11-16 NOTE — CARE COORDINATION
ONGOING DISCHARGE PLAN:    Patient is alert and oriented x4. Spoke with patient regarding discharge plan and patient confirms that plan is still to discharge to home with no needs    Home ox delivered to the patients room     On atb     Prednisone taper     Will continue to follow for additional discharge needs.     Electronically signed by Ton Sterling RN on 11/16/2022 at 4:01 PM

## 2022-11-16 NOTE — CARE COORDINATION
HOME OXYGEN:    Portable 02 tank delivered per [facility]. 02 tank turned on and noted to be full. Patient understands to call [facility] immediately upon arrival home to have 02 concentrator delivered.     Electronically signed by Sabi Blanchard RN on 11/16/2022 at 3:52 PM

## 2022-11-16 NOTE — PROGRESS NOTES
Patient transferred from ICU 2006 to U 2121 with all belongings by wheel chair and on 4 L NC. No distress noted, oriented to room and call light within reach.

## 2022-11-16 NOTE — PROGRESS NOTES
Physician Progress Note      PATIENT:               Gladys Moore  CSN #:                  [id number]  :                       1965  ADMIT DATE:       2022 11:16 AM  DISCH DATE:  RESPONDING  PROVIDER #:        Doyle Castellon MD          QUERY TEXT:    Patient admitted for treatment of acute on chronic respiratory failure with   COPD exacerbation and has documentation of \"Sepsis secondary to PNA\" noted in   the H+P. On presentation, leukocytosis 18.4 w/ 14% bandemia and positive   source of infection has been identified. Please determine if this patient is   being evaluated and/or treated for sepsis, or if sepsis has been ruled out   after further study. The medical record reflects the following:  -Risk Factors: Severe COPD, Chronic Resp Failure, Noncompliance.  -Clinical Indicators: Pt was placed on 15L NRB on arrival to ED followed by   [facility]. WBC 18.4, 14% bands. Leukocytosis peaked at 21.8 on 11/10;  Procal 0.10,   + Adenovirus on Resp panel. CT Chest showed concern for atypical   mycobacterial infection. H+P notes \"sepsis secondary to multifocal pneumonia\",   ID consultant notes \" Adenovirus respiratory infection with possible   superimposed bacterial infection. SOFA Score of 9. Vitals show tachycardia,   tachypnea and initial hypotension that was treated with IVF bolus's and   midodrine.  -Treatment: NIV, HF Oxygen, Rocephin, Azithromycin, IV steroids, droplet   isolation, ID and Pulm Consult.   Options provided:  -- Sepsis secondary to adenoviral PNA confirmed  -- Sepsis secondary to bacterial PNA  -- Sepsis multifactorial etiology of adenoviral PNA with superimposed   bacterial PNA  -- Adenoviral PNA with superimposed bacterial PNA without sepsis  -- Adenoviral PNA only, bacterial PNA and sepsis have been ruled out  -- Bacterial PNA only, adenoviral pna and sepsis ruled out  -- Other - I will add my own diagnosis  -- Disagree - Not applicable / Not valid  -- Disagree - Clinically unable to determine / Unknown  -- Refer to Clinical Documentation Reviewer    PROVIDER RESPONSE TEXT:    This patient has sepsis secondary to adenoviral PNA.     Query created by: Phillip Reyes on 11/14/2022 4:19 PM      Electronically signed by:  Deirdre Spring MD 11/16/2022 8:10 AM

## 2022-11-16 NOTE — PROGRESS NOTES
Home Oxygen Evaluation    Room air SpO2 at Rest = 85%    Room air with exercise/exertion = N/A    SpO2 on prescribed O2 level at  2  LPM  at rest = 92%     with exercise/exertion = 3L 90%    Nocturnal Oximetry with patient on room air recommended if the resting SpO2 is 89% to 95%.  (Requires additional order)

## 2022-11-16 NOTE — PROGRESS NOTES
[address] [facility] [employer]    PROGRESS NOTE             11/16/2022    6:20 PM    Name:   Pao Calvo  MRN:     [id number]     Acct:      [de-identified]   Room:   2121/2121-01   Day:  8  Admit Date:  11/8/2022 11:16 AM    PCP:  GLORIA BeeR  Code Status:  Full Code    Subjective:     C/C:   Chief Complaint   Patient presents with    Dizziness    Cough    Shortness of Breath     Interval History Status: improved. Patient seen and examined at bedside  She is doing better today, moved out of ICU to [facility]. She was just discharged, home oxygen eval done to go home on 2 to 3 L  Without becoming is noncompliant with BiPAP  Brief History:      The patient is a 61year old non- female with a history of COPD (not on home oxygen), smoking (60 pack-years), hypertension, and hyperlipidemia who presents with shortness of breath worsening over the past week. The patient endorses a cough productive of green phlegm that has also worsened over the past week. She denies headache, dizziness, syncope, fever, chills, abdominal pain, nausea, vomiting, constipation and diarrhea. In the ED, the patient was hypoxic  (Sp02 58% on room air), tachycardic (108), and tachypneic (34). ABGs showed hypercapnia (pC02 58.3) and hypoxia (pO2 63.4). The patient was placed on BiPAP, but did not tolerate it and was placed on high flow nasal cannula. The patient was on 30 L high flow with 50% Fi02 and saturating 92%. The patient was given one dose of Solumedrol 125 mg and one dose of Duoneb nebulizer. The patient was given a bolus of IV normal saline. Respiratory panel was positive for adenovirus. IV ceftriaxone was started. Glucose was 158. Creatinine was 0.71. WBCs were elevated at 18.4. Troponin was elevated at 56; repeat 50.  EKG showed sinus tachycardia with occasional PVCs, possible LA enlargement, right superior axis deviation, and incomplete right bundle branch block, RV hypertrophy, T wave abnormality (possible inferior and anterolateral ischemia). CT chest was negative for pulmonary embolism, but showed multifocal tree-in-bud and nodular opacities throughout both lungs likely representing recurrent aspiration or atypical mycobacterium infection; mild emphysema, bronchiectasis and scarring along medial right upper lobe. The patient was admitted for management of acute on chronic hypoxic, hypercapnic respiratory failure secondary to COPD exacerbation and multifocal pneumonia. Pulmonology and infectious disease were consulted     Review of Systems:     Review of Systems   Constitutional:  Negative for chills and fever. HENT:  Negative for rhinorrhea and sore throat. Respiratory:  Positive for cough and shortness of breath. Cardiovascular:  Negative for chest pain. Gastrointestinal:  Negative for abdominal pain, nausea and vomiting. Genitourinary:  Negative for dysuria, frequency and urgency. Neurological:  Negative for dizziness and light-headedness. Hematological:  Positive for adenopathy. Psychiatric/Behavioral:  Negative for agitation. Medications:      Allergies:  No Known Allergies    Current Meds:   Scheduled Meds:    predniSONE  10 mg Oral Daily    hydrALAZINE  50 mg Oral 3 times per day    hydroCHLOROthiazide  25 mg Oral Daily    insulin lispro  0-8 Units SubCUTAneous TID WC    insulin lispro  0-4 Units SubCUTAneous Nightly    fluticasone  1 spray Each Nostril Daily    lisinopril  40 mg Oral Daily    dilTIAZem  30 mg Oral 4 times per day    ipratropium-albuterol  1 ampule Inhalation Q4H    doxycycline monohydrate  100 mg Oral 2 times per day    sodium chloride flush  5-40 mL IntraVENous 2 times per day    enoxaparin  40 mg SubCUTAneous Daily    atorvastatin  40 mg Oral Daily    montelukast  10 mg Oral Nightly    benzonatate  200 mg Oral TID    dextromethorphan  60 mg Oral 2 times per day    nicotine  1 patch TransDERmal Daily     Continuous Infusions:    dextrose      sodium chloride       PRN Meds: LORazepam, potassium chloride **OR** potassium alternative oral replacement **OR** potassium chloride, hydrALAZINE, sodium phosphate IVPB **OR** sodium phosphate IVPB **OR** sodium phosphate IVPB, glucose, dextrose bolus **OR** dextrose bolus, glucagon (rDNA), dextrose, perflutren lipid microspheres, sodium chloride flush, sodium chloride flush, sodium chloride, ondansetron **OR** ondansetron, polyethylene glycol, acetaminophen **OR** acetaminophen    Data:     Past Medical History:   has a past medical history of Acute respiratory failure ([facility] [address] COPD (chronic obstructive pulmonary disease) ([facility] [address] Cough, Current every day smoker, Dyspnea, Heart murmur, Hypertension, and Shortness of breath. Social History:   reports that she has been smoking cigarettes. She has been smoking an average of .5 packs per day. She has never used smokeless tobacco. She reports that she does not drink alcohol and does not use drugs. Family History: History reviewed. No pertinent family history. Vitals:  BP (!) 171/79   Pulse 83   Temp 98.6 °F (37 °C) (Oral)   Resp 18   Ht 4' 11\" (1.499 m)   Wt 134 lb 4.2 oz (60.9 kg)   SpO2 91%   BMI 27.12 kg/m²   Temp (24hrs), Av °F (36.7 °C), Min:97.1 °F (36.2 °C), Max:98.6 °F (37 °C)    Recent Labs     11/15/22  2148 22  0522 22  1130 22  1620   POCGLU 146* 125* 155* 139*       I/O(24Hr):     Intake/Output Summary (Last 24 hours) at 2022 1820  Last data filed at 2022 0931  Gross per 24 hour   Intake 873.6 ml   Output --   Net 873.6 ml       Labs:  [unfilled]    Lab Results   Component Value Date/Time    SPECIAL NOT REPORTED 10/28/2019 07:58 PM     Lab Results   Component Value Date/Time    CULTURE NORMAL RESPIRATORY DAVID MODERATE GROWTH 2022 10:25 PM       [unfilled]    Radiology:    XR CHEST PORTABLE    Result Date: 2022  EXAMINATION: ONE XRAY VIEW OF THE CHEST 2022 6:24 am COMPARISON: Chest radiograph performed 11/10/2022. HISTORY: ORDERING SYSTEM PROVIDED HISTORY: Adenovirus pneumonia, severe COPD TECHNOLOGIST PROVIDED HISTORY: Adenovirus pneumonia, severe COPD Reason for Exam: adenovirus pneumonia, severe COPD FINDINGS: There is no acute consolidation or effusion. There is no pneumothorax. The mediastinal structures are unremarkable. The upper abdomen is unremarkable. The extrathoracic soft tissues are unremarkable. There is a right-sided PICC line with the tip in the mid SVC. No acute cardiopulmonary process. Right-sided PICC line with the tip in the mid SVC. XR CHEST PORTABLE    Result Date: 11/10/2022  EXAMINATION: ONE XRAY VIEW OF THE CHEST 11/10/2022 7:46 am COMPARISON: 11/08/2022, 10/29/2019 HISTORY: ORDERING SYSTEM PROVIDED HISTORY: SOB TECHNOLOGIST PROVIDED HISTORY: SOB Reason for Exam: dyspnea FINDINGS: Interstitial opacities with mild septal thickening has increased in the interval.  Perihilar opacities are noted as well. No pneumothorax or significant effusion appreciated. Cardiac and mediastinal contours appear unchanged. Increasing interstitial opacities, which may represent developing edema versus inflammatory process or atypical infection. XR CHEST PORTABLE    Result Date: 11/8/2022  EXAMINATION: ONE XRAY VIEW OF THE CHEST 11/8/2022 8:37 am COMPARISON: 10/19/2019 HISTORY: ORDERING SYSTEM PROVIDED HISTORY: sob TECHNOLOGIST PROVIDED HISTORY: sob Reason for Exam: sob FINDINGS: Cardial pericardial silhouette is prominent but stable. Increased interstitial opacities noted bilaterally. Focal infiltrate is not identified. No pneumothorax. No free air. No acute bony abnormality. Increased interstitial opacity. While much or all of this may be chronic, please correlate with any clinical evidence of acute interstitial edema.      CT CHEST PULMONARY EMBOLISM W CONTRAST    Result Date: 11/8/2022  EXAMINATION: CTA OF THE CHEST 11/8/2022 12:44 pm TECHNIQUE: CTA of the chest was performed after the administration of intravenous contrast.  Multiplanar reformatted images are provided for review. MIP images are provided for review. Automated exposure control, iterative reconstruction, and/or weight based adjustment of the mA/kV was utilized to reduce the radiation dose to as low as reasonably achievable. COMPARISON: 10/31/2019 HISTORY: ORDERING SYSTEM PROVIDED HISTORY: sob tachycardia TECHNOLOGIST PROVIDED HISTORY: sob tachycardia Decision Support Exception - unselect if not a suspected or confirmed emergency medical condition->Emergency Medical Condition (MA) Reason for Exam: sob, COPD 56-year-old female with shortness of breath, COPD, tachycardia FINDINGS: Pulmonary Arteries: No obvious filling defect in the pulmonary arterial vasculature that meets the criteria for pulmonary embolus within the limitations of this study. Evaluation of the pulmonary arterial vasculature is limited due to streak artifact from the contrast bolus in the SVC, suboptimal bolus timing, and respiratory motion. Mediastinum: Visualized thyroid gland grossly unremarkable in appearance. Atheromatous plaque and atherosclerotic calcification of the aorta. Coronary artery disease. Bilateral hilar lymph node enlargement. No axillary lymphadenopathy. No mediastinal lymphadenopathy. No dissection flap within the visualized thoracic aorta. No pericardial or pleural effusions. No periaortic or mediastinal hemorrhage. Lungs/pleura: Trachea and proximal central airways appear patent. Respiratory motion throughout both lungs. Mild emphysema. Bronchiectasis and scarring along the medial right upper lobe. Tree-in-bud and nodular opacities throughout the bilateral upper lobes. Similar findings are seen within the right middle lobe, lingula, and superior segments of the lower lobes as well as the anteroinferior lower lobes. Upper Abdomen: Fatty liver.   Atheromatous plaque and atherosclerotic calcification of the upper abdominal aorta and branch vasculature. Evaluation of the upper abdomen is limited due to respiratory motion. Soft Tissues/Bones: Mild diffuse degenerative changes throughout the spine. 1. No clear evidence for central pulmonary embolus within the limitations of this study. 2. Multifocal tree-in-bud and nodular opacities throughout both lungs as detailed above likely representing sequela of recurrent aspiration or atypical mycobacterial infection. Follow-up is recommended to document resolution. 3. Mild emphysema. Respiratory motion. Bronchiectasis and scarring along the medial right upper lobe. 4. Atheromatous plaque and atherosclerotic calcification of the aorta. Coronary artery disease. 5. Bilateral hilar lymph node enlargement, likely reactive. 6. Fatty liver. VL Upper Extremity Venous Duplex Left    Result Date: 11/11/2022    [facility]  Vascular Upper Extremities Veins Procedure   Patient Name   Alverto DAUGHERTY  Date of Study           11/10/2022   Date of Birth  1965  Gender                  Female   Age            61 year(s)  Race                       Room Number    2006        Height:                 59 inch, 149.86 cm   Corporate ID # [id number]    Weight:                 128 pounds, 58.1 kg   Patient Acct # [de-identified]   BSA:        1.53 m^2    BMI:       25.85 kg/m^2   MR #           [id number]      Sonographer             Krista Zapata, [facility] [place]   Accession #    [id number]  Interpreting Physician  Fanny Sanchez   Referring                  Referring Physician     Phylicia Brar  Nurse  Practitioner  Procedure Type of Study:   Veins: Upper Extremities Veins, Venous Scan Upper Left. Indications for Study:R/O DVT. Patient Status: In Patient. Technical Quality:Adequate visualization. Conclusions   Summary   No evidence of superficial or deep venous thrombosis in the left upper  extremity.    Signature ----------------------------------------------------------------  Electronically signed by Kwesi Boykin RVT(Sonographer) on  11/10/2022 12:49 PM  ----------------------------------------------------------------   ----------------------------------------------------------------  Electronically signed by Runell Dama Reyes,Arthur(Interpreting  physician) on 11/11/2022 02:42 PM  ----------------------------------------------------------------  Findings:   Right Impression:          Left Impression:  Right subclavian vein is   Left internal jugular, subclavian, axillary,  compressible with normal   brachial, ulnar, radial, cephalic and basilic  doppler responses. veins are compressible with normal doppler                             responses. Velocities are measured in cm/s ; Diameters are measured in cm Right UE Vein Measurements 2D Measurements +---------------+-----------------+----------------------+-----------------+ ! Location       ! Visualized       ! Compressibility       ! Thrombosis       ! +---------------+-----------------+----------------------+-----------------+ ! Dist SCV       ! Yes              ! Yes                   ! None             ! +---------------+-----------------+----------------------+-----------------+ Left UE Vein Measurements 2D Measurements +------------------------------------+----------+---------------+----------+ ! Location                            ! Visualized! Compressibility! Thrombosis! +------------------------------------+----------+---------------+----------+ ! Prox IJV                            ! Yes       ! Yes            ! None      ! +------------------------------------+----------+---------------+----------+ ! Dist IJV                            ! Yes       ! Yes            ! None      ! +------------------------------------+----------+---------------+----------+ ! Prox SCV                            ! Yes       ! Yes            ! None      ! +------------------------------------+----------+---------------+----------+ ! Dist SCV                            ! Yes       ! Yes            ! None      ! +------------------------------------+----------+---------------+----------+ ! Prox Axillary                       ! Yes       ! Yes            ! None      ! +------------------------------------+----------+---------------+----------+ ! Dist Axillary                       ! Yes       ! Yes            ! None      ! +------------------------------------+----------+---------------+----------+ ! Prox Brachial                       !Yes       ! Yes            ! None      ! +------------------------------------+----------+---------------+----------+ ! Dist Brachial                       !Yes       ! Yes            ! None      ! +------------------------------------+----------+---------------+----------+ ! Prox Radial                         !Yes       ! Yes            ! None      ! +------------------------------------+----------+---------------+----------+ ! Dist Radial                         !Yes       ! Yes            ! None      ! +------------------------------------+----------+---------------+----------+ ! Prox Ulnar                          ! Yes       ! Yes            ! None      ! +------------------------------------+----------+---------------+----------+ ! Dist Ulnar                          ! Yes       ! Yes            ! None      ! +------------------------------------+----------+---------------+----------+ ! Basilic at UA                       ! Yes       ! Yes            ! None      ! +------------------------------------+----------+---------------+----------+ ! Basilic at AF                       ! Yes       ! Yes            ! None      ! +------------------------------------+----------+---------------+----------+ ! Shelley at [address]                       ! Yes       ! Yes            ! None      ! +------------------------------------+----------+---------------+----------+ ! Juan at UA !Yes       !Yes            ! None      ! +------------------------------------+----------+---------------+----------+ ! Cephalic at AF                      ! Yes       ! Yes            ! None      ! +------------------------------------+----------+---------------+----------+ ! Cephalic at [address]                      ! Yes       ! Yes            ! None      ! +------------------------------------+----------+---------------+----------+        Physical Examination:        Physical Exam  Constitutional:       General: She is not in acute distress. Appearance: She is not ill-appearing. HENT:      Head: Atraumatic. Nose: No congestion. Mouth/Throat:      Mouth: Mucous membranes are moist.   Cardiovascular:      Rate and Rhythm: Normal rate and regular rhythm. Heart sounds: No murmur heard. Pulmonary:      Effort: Pulmonary effort is normal. No respiratory distress. Breath sounds: Wheezing present. Chest:      Chest wall: No tenderness. Abdominal:      General: Abdomen is flat. Bowel sounds are normal. There is no distension. Palpations: Abdomen is soft. There is no mass. Tenderness: There is no abdominal tenderness. Hernia: No hernia is present. Musculoskeletal:      Cervical back: No rigidity or tenderness. Skin:     General: Skin is warm. Neurological:      General: No focal deficit present. Mental Status: She is alert and oriented to person, place, and time.    Psychiatric:         Mood and Affect: Mood normal.         Assessment:        Primary Problem  Acute respiratory failure [facility])    Active Hospital Problems    Diagnosis Date Noted    Acute respiratory failure ([facility] [J96.00] 11/08/2022     Priority: Medium    [facility] [J44.9] 10/28/2019       Plan:        Acute on chronic hypoxic, hypercapnic respiratory failure 2/2 COPD exacerbation and multifocal pneumonia r/o TB  -ABGs 11/10: pH 7.325, pC02 81.2, p02 70.6; HC03 42.3  -Patient on 2 to 3 L, does not use BiPAP due to anxiety  -CXR: increased interstitial opacity (edema vs. Atypical resp. Infection)  -Repeat CXR 11/12: no acute cardiopulmonary process  -CT PE: multifocal tree-in-bud and nodular opacities throughout both lungs likely representing recurrent aspiration or atypical mycobacterium infection; mild emphysema, bronchiectasis and scarring along medial right upper lobe; Lt. subclavian artery stenosis vs. occlusion  -WBCs on admission 18.4; today: 17.3  -Respiratory viral panel: positive for adenovirus  -Respiratory culture normal valdez  -Legionella negative, S. Pneumoniae negative, Mycoplasma negative  -QuantiFERON gold test: negative   -Pro-BNP: 1,874  -Echo showed EF of about 55%  -Pro-calcitonin 0.10  -Lactic acid 0.8  -Patient discharged on 3 doses of doxycycline, per pulmonology  -IV steroids tapered to oral prednisone on discharge      Hypokalemia   -3.6 on discharge    Hyperlipidemia  -Continue Lipitor 40 mg po daily      Hypertension  -Hypertensive urgency overnight (systolic > 458); now resolved   -Continue Hydralazine 10 mg IV q 6 hours PRN  -Start Hydralazine 50 mg po q 8 hours   -Continue Lisinopril 40 mg po daily (home med)  -Continue Diltiazem 30 mg po q 6 hours   -Continue HCTZ 25 mg po daily   -Discontinue Lopressor 5 mg IV q 6 hours PRN   -Patient's blood pressure today 171/79     Contact and droplet isolation   DVT prophylaxis: Lovenox 40 mg SC daily   Code: Full code  Diet: Regular adult diet  PT/OT/SW consulted        Sophia Santa MD  11/16/2022  6:20 PM       Attending Physician Statement  I have discussed the care of Elida Lay and I have examined the patient myselft and taken ros and hpi , including pertinent history and exam findings,  with the resident. I have reviewed the key elements of all parts of the encounter with the resident. I agree with the assessment, plan and orders as documented by the resident.   Spent 35 minutes in reviewing data/medicines/talking to patient/family,  explaining and answering all the questions.     Acute on chronic hypoxic and hypercapnic respiratory failure,  COPD exacerbation,  Multifocal pneumonia, TB ruled out,  Noncompliance,  Current smoker,  60-pack-year history of smoking,  Hypokalemia, next hyperlipidemia,  Hypertension,  Home o2 eval    Electronically signed by Mae Green MD

## 2022-11-16 NOTE — PROGRESS NOTES
Magdiel Rodriguez MD/Sharad Ibarra MD/ Dr. Kelly ROSSP-BC, NP-C      Asha Hogan APRN NP-C          [id number] [facility] APRN - NP-C                                      Critical Care / Pulmonary Progress Note    Patient - Tushar Wade   Age - 61 y.o.   - 1965  MRN - [id number]  Acct # - [de-identified]  Date of Admission - 2022 11:16 AM    Consulting Service/Physician:        Primary Care Physician: Daylin Montero DTR    SUBJECTIVE:     Chief Complaint:   Chief Complaint   Patient presents with    Dizziness    Cough    Shortness of Breath     Subjective:    She had declined BiPAP overnight. She is on 3 L nasal cannula. Normally does not wear oxygen at home. Blood pressure has been elevated. She tells me she has plans for possible discharge today. She feels ready to go home. VITALS  /67   Pulse 83   Temp 97.1 °F (36.2 °C) (Oral)   Resp 18   Ht 4' 11\" (1.499 m)   Wt 134 lb 4.2 oz (60.9 kg)   SpO2 92%   BMI 27.12 kg/m²   Wt Readings from Last 3 Encounters:   11/15/22 134 lb 4.2 oz (60.9 kg)   19 162 lb 14.7 oz (73.9 kg)   18 150 lb (68 kg)     I/O (24 Hours)    Intake/Output Summary (Last 24 hours) at 2022 0942  Last data filed at 2022 0931  Gross per 24 hour   Intake 873.6 ml   Output --   Net 873.6 ml     Ventilator:   Settings  FiO2 : 40 %  PEEP/CPAP (cm H2O): 40 cm H20  Insp Rise Time (%): 2 %  Exam:   Physical Exam   Constitutional:  Oriented to person, place, and time. Appears well-developed and well-nourished. HENT: Unremarkable  Head: Normocephalic and atraumatic. Eyes: EOM are normal. Pupils are equal, round, and reactive to light. Neck: Neck supple. Cardiovascular:  Regular rate and rhythm. Normal heart tones. No JVD. Pulmonary/Chest: Effort normal and breath sounds are slightly diminished with few expiratory wheezes  Abdominal: Soft.  Bowel sounds are normal. There is no tenderness. Musculoskeletal: Normal range of motion. She exhibits no edema or tenderness. Neurological:patient is alert and oriented to person, place, and time. Skin: Skin is warm and dry. No rash noted.    Extremities: No edema or discoloration  Infusions:      dextrose      sodium chloride       Meds:     Current Facility-Administered Medications:     hydrALAZINE (APRESOLINE) tablet 50 mg, 50 mg, Oral, 3 times per day, Jose Cruz Saldivar MD, 50 mg at 11/16/22 0520    methylPREDNISolone sodium (SOLU-MEDROL) injection 30 mg, 30 mg, IntraVENous, Q8H, Jose Cruz Saldivar MD, 30 mg at 11/16/22 0850    LORazepam (ATIVAN) tablet 0.5 mg, 0.5 mg, Oral, PRN, Brenda Townsend MD, 0.5 mg at 11/15/22 0815    hydroCHLOROthiazide (HYDRODIURIL) tablet 25 mg, 25 mg, Oral, Daily, Jose Cruz Saldivar MD, 25 mg at 11/16/22 0849    insulin lispro (HUMALOG) injection vial 0-8 Units, 0-8 Units, SubCUTAneous, TID WC, Jose Cruz Saldivar MD, 2 Units at 11/13/22 0911    insulin lispro (HUMALOG) injection vial 0-4 Units, 0-4 Units, SubCUTAneous, Nightly, Jose Cruz Saldivar MD    potassium chloride (KLOR-CON M) extended release tablet 40 mEq, 40 mEq, Oral, PRN, 40 mEq at 11/15/22 0652 **OR** potassium bicarb-citric acid (EFFER-K) effervescent tablet 40 mEq, 40 mEq, Oral, PRN **OR** potassium chloride 10 mEq/100 mL IVPB (Peripheral Line), 10 mEq, IntraVENous, PRN, Jose Cruz Saldivar MD    fluticasone (FLONASE) 50 MCG/ACT nasal spray 1 spray, 1 spray, Each Nostril, Daily, Brenda Townsend MD, 1 spray at 11/15/22 0815    lisinopril (PRINIVIL;ZESTRIL) tablet 40 mg, 40 mg, Oral, Daily, Janet Nam MD, 40 mg at 11/16/22 0849    dilTIAZem (CARDIZEM) tablet 30 mg, 30 mg, Oral, 4 times per day, Janet Nam MD, 30 mg at 11/16/22 0520    hydrALAZINE (APRESOLINE) injection 10 mg, 10 mg, IntraVENous, Q6H PRN, EDWARD Anthony - CNP, 10 mg at 11/16/22 0048    sodium phosphate 10 mmol in sodium chloride 0.9 % 250 mL IVPB, 10 mmol, IntraVENous, PRN, Stopped at 11/13/22 1150 **OR** sodium phosphate 15 mmol in sodium chloride 0.9 % 250 mL IVPB, 15 mmol, IntraVENous, PRN **OR** sodium phosphate 20 mmol in sodium chloride 0.9 % 500 mL IVPB, 20 mmol, IntraVENous, PRN, Eleazar Fermin MD, Stopped at 11/11/22 2101    glucose chewable tablet 16 g, 4 tablet, Oral, PRN, Leonides Adorno MD    dextrose bolus 10% 125 mL, 125 mL, IntraVENous, PRN **OR** dextrose bolus 10% 250 mL, 250 mL, IntraVENous, PRN, Leonides Adorno MD    glucagon (rDNA) injection 1 mg, 1 mg, SubCUTAneous, PRN, Leonides Adorno MD    dextrose 10 % infusion, , IntraVENous, Continuous PRN, Leonides Adorno MD    ipratropium-albuterol (DUONEB) nebulizer solution 1 ampule, 1 ampule, Inhalation, Q4H, Chandler Banks MD, 1 ampule at 11/16/22 0815    perflutren lipid microspheres (DEFINITY) injection 1.5 mL, 1.5 mL, IntraVENous, ONCE PRN, Tara Mejia MD    doxycycline monohydrate (MONODOX) capsule 100 mg, 100 mg, Oral, 2 times per day, Shy Ford MD, 100 mg at 11/16/22 0849    sodium chloride flush 0.9 % injection 10 mL, 10 mL, IntraVENous, PRN, Rubens Guan DO, 10 mL at 11/08/22 1245    sodium chloride flush 0.9 % injection 5-40 mL, 5-40 mL, IntraVENous, 2 times per day, Constantine Ernst MD, 10 mL at 11/16/22 0901    sodium chloride flush 0.9 % injection 5-40 mL, 5-40 mL, IntraVENous, PRN, Constantine Ernst MD    0.9 % sodium chloride infusion, 25 mL, IntraVENous, PRN, Constantine Ernst MD    enoxaparin (LOVENOX) injection 40 mg, 40 mg, SubCUTAneous, Daily, Constantine Ernst MD, 40 mg at 11/16/22 0849    ondansetron (ZOFRAN-ODT) disintegrating tablet 4 mg, 4 mg, Oral, Q8H PRN **OR** ondansetron (ZOFRAN) injection 4 mg, 4 mg, IntraVENous, Q6H PRN, Constantine Ernst MD, 4 mg at 11/15/22 8484    polyethylene glycol (GLYCOLAX) packet 17 g, 17 g, Oral, Daily PRN, Constantine Ernst MD    acetaminophen (TYLENOL) tablet 650 mg, 650 mg, Oral, Q6H PRN **OR** acetaminophen (TYLENOL) suppository 650 mg, 650 mg, Rectal, Q6H PRN, Carollynn Duane, MD    atorvastatin (LIPITOR) tablet 40 mg, 40 mg, Oral, Daily, Carollynn Duane, MD, 40 mg at 11/16/22 0849    montelukast (SINGULAIR) tablet 10 mg, 10 mg, Oral, Nightly, Carollynn Duane, MD, 10 mg at 11/15/22 2141    benzonatate (TESSALON) capsule 200 mg, 200 mg, Oral, TID, Nicholas Lei MD, 200 mg at 11/16/22 0849    dextromethorphan (DELSYM) 30 MG/5ML extended release liquid 60 mg, 60 mg, Oral, 2 times per day, Nicholas Lei MD, 60 mg at 11/16/22 0849    nicotine (NICODERM CQ) 21 MG/24HR 1 patch, 1 patch, TransDERmal, Daily, Nicholas Lei MD, 1 patch at 11/16/22 0852    Lab Results:     [unfilled]  Lab Results   Component Value Date    WBC 17.3 (H) 11/16/2022    HGB 15.1 11/16/2022    HCT 45.7 11/16/2022    MCV 88.9 11/16/2022     11/16/2022     Lab Results   Component Value Date    CALCIUM 9.2 11/16/2022     (L) 11/16/2022    K 3.6 (L) 11/16/2022    CO2 36 (H) 11/16/2022    CL 87 (L) 11/16/2022    BUN 18 11/16/2022    CREATININE 0.40 (L) 11/16/2022     No components found for: ABGSAMPLETYP, ABGBODYTEMP, ABGPHCORRFOR, [id number], ABGPHCORRFOOR, ABGPH, ABGPCO2, ABGPO2, ABGBASEEXCES, ABGBASEDEFIC, ABGHCO3, FPRW0DQX, ABGENDTIDALC, ABGALLENSTES, ABGSPO2, ABGSAMEPLESIT, [id number], ABGOXYGENSOU  Lab Results   Component Value Date    INR 1.0 11/08/2022    PROTIME 13.1 11/08/2022       Radiology:       ASSESSMENT:   Principal Problem:    Acute respiratory failure (HCC)  Active Problems:    Chronic obstructive pulmonary disease ([address]  Resolved Problems:    * No resolved hospital problems. *      Acute hypoxic respiratory failure-on 3 L normally not on oxygen  Chronic respiratory failure with severe hypercapnia. PCO2 in the 60s with normal pH.   Multifocal pneumonia-most likely atypical/adenovirus  Acute exacerbation COPD  Poorly controlled hypertension  Hyponatremia-most likely SIADH from underlying pulmonary issues-130  Chronic hypercapnic respiratory failure  History of tobacco abuse  Medical noncompliance  Hypertension. Full code. PLAN:   Blood pressure management to primary service  Patient's procalcitonin was 0.03 yesterday  Home O2 eval  Patient will need outpatient follow-up in our office in 4 to 6 weeks [phone]  If patient is discharged will need another 3 doses of doxycycline  Recommend prednisone taper at time of discharge transition over to oral steroids here  I spoke to charge nurse    Face-to-face valuation is performed. Patient will use an benefit from oxygen therapy. She requires 2 L at rest and 3 L with exertion. her pulse ox was 85% at rest.      Electronically signed by EDWARD Coronado CNP on 11/16/22     This progress note was completed using a voice transcription system. Every effort was made to ensure accuracy. However, inadvertent computerized transcription errors may be present.     [address] [facility]

## 2022-12-11 NOTE — DISCHARGE SUMMARY
[address]    Discharge Summary     Patient ID: Mitch Pope  :  1965   MRN: [id number]     ACCOUNT:  [de-identified]   Patient's PCP: Janet Caballero DTR  Admit Date: 2022   Discharge Date: 2022  Length of Stay: 8  Code Status:  Prior  Admitting Physician: Nuria Mei MD  Discharge Physician: Vero Moreno MD     Active Discharge Diagnoses:       Primary Problem  Acute respiratory failure [facility])      [facility] Problems    Diagnosis Date Noted    Acute respiratory failure ([facility] [J96.00] 2022     Priority: Medium    Chronic obstructive pulmonary disease ([facility] [J44.9] 10/28/2019       Admission Condition:  fair     Discharged Condition: good    Hospital Stay:       Hospital Course: Mitch Pope is a 61 y.o. female  with a past medical history of COPD (not on home oxygen), smoking (60 pack-years), hypertension, and hyperlipidemia who presented to the ED with shortness of breath that was worsening over the past week. The patient Dorst a cough productive of green phlegm that had also worsened over the past week. She denied headache, dizziness, syncope, fever, chills, abdominal pain, nausea, vomiting, constipation and diarrhea. In the ED, the patient was hypoxic  (Sp02 58% on room air), tachycardic (108), and tachypneic (34). ABGs showed hypercapnia (pC02 58.3) and hypoxia (pO2 63.4). The patient was placed on BiPAP, but did not tolerate it and was placed on high flow nasal cannula. The patient was on 30 L high flow with 50% Fi02 and saturating 92%. Respiratory panel was positive for adenovirus. WBCs were elevated at 18.4. CT chest showed multifocal tree-in-bud and nodular opacities throughout both lungs likely representing recurrent aspiration or atypical mycobacterium infection.  The patient was admitted for management of acute on chronic hypoxic, hypercapnic respiratory failure secondary to COPD exacerbation and multifocal pneumonia. Pulmonology and infectious disease were consulted. The patient was treated with cefepime and doxycycline, Solu-Medrol, DuoNeb aerosols, antitussives, Singulair, Flonase. The patient developed hypokalemia during admission. Potassium was replaced and the electrolyte abnormality resolved. The patient's hypertension was treated with lisinopril, diltiazem, HCTZ , and hydralazine scheduled and as needed. The patient developed hypertensive urgency during admission which required Cardene drip. The patient was discharged home on home oxygen. Significant therapeutic interventions: none    Significant Diagnostic Studies:   Labs / Micro:  CBC:   Lab Results   Component Value Date/Time    WBC 17.3 11/16/2022 04:36 AM    RBC 5.14 11/16/2022 04:36 AM    HGB 15.1 11/16/2022 04:36 AM    HCT 45.7 11/16/2022 04:36 AM    MCV 88.9 11/16/2022 04:36 AM    MCH 29.3 11/16/2022 04:36 AM    MCHC 32.9 11/16/2022 04:36 AM    RDW 13.8 11/16/2022 04:36 AM     11/16/2022 04:36 AM       Radiology:    XR CHEST PORTABLE    Result Date: 11/12/2022  EXAMINATION: ONE XRAY VIEW OF THE CHEST 11/12/2022 6:24 am COMPARISON: Chest radiograph performed 11/10/2022. HISTORY: ORDERING SYSTEM PROVIDED HISTORY: Adenovirus pneumonia, severe COPD TECHNOLOGIST PROVIDED HISTORY: Adenovirus pneumonia, severe COPD Reason for Exam: adenovirus pneumonia, severe COPD FINDINGS: There is no acute consolidation or effusion. There is no pneumothorax. The mediastinal structures are unremarkable. The upper abdomen is unremarkable. The extrathoracic soft tissues are unremarkable. There is a right-sided PICC line with the tip in the mid SVC. No acute cardiopulmonary process. Right-sided PICC line with the tip in the mid SVC.          Consultations:    Consults:     Final Specialist Recommendations/Findings:   IP CONSULT TO INTERNAL MEDICINE  IP CONSULT TO PULMONOLOGY  IP CONSULT TO INFECTIOUS DISEASES  IP CONSULT TO VASCULAR SURGERY  IP CONSULT TO DIETITIAN      The patient was seen and examined on day of discharge and this discharge summary is in conjunction with any daily progress note from day of discharge. Discharge plan:       Disposition: Home    Physician Follow Up:     [address]  [address] [place] [address]  [phone]    Follow up  Call when you arrive to home to have all oxygen supplies delivered    Denise Kamara, [address]  [phone]    Follow up in 2 week(s)      Debo Segura MD  [address], [address]  [phone]    Follow up in 4 week(s)         Requiring Further Evaluation/Follow Up POST HOSPITALIZATION/Incidental Findings: none    Diet: regular diet    Activity: As tolerated    Instructions to Patient: Follow-up outpatient    Discharge Medications:      Medication List        START taking these medications      nicotine 21 MG/24HR  Commonly known as: NICODERM CQ  Place 1 patch onto the skin daily            CONTINUE taking these medications      * albuterol (2.5 MG/3ML) 0.083% nebulizer solution  Commonly known as: PROVENTIL     * albuterol sulfate  (90 Base) MCG/ACT inhaler  Commonly known as: PROVENTIL;VENTOLIN;PROAIR     atorvastatin 40 MG tablet  Commonly known as: LIPITOR  Take 1 tablet by mouth daily     fluticasone 50 MCG/ACT nasal spray  Commonly known as: FLONASE     lisinopril 20 MG tablet  Commonly known as: PRINIVIL;ZESTRIL  Take 1 tablet by mouth 2 times daily     loratadine 10 MG tablet  Commonly known as: CLARITIN     montelukast 10 MG tablet  Commonly known as: SINGULAIR     multivitamin tablet     tiotropium 18 MCG inhalation capsule  Commonly known as: SPIRIVA           * This list has 2 medication(s) that are the same as other medications prescribed for you. Read the directions carefully, and ask your doctor or other care provider to review them with you.                 ASK your doctor about these medications      doxycycline monohydrate 100 MG capsule  Commonly known as: MONODOX  Take 1 capsule by mouth every 12 hours for 3 doses  Ask about: Should I take this medication?     predniSONE 10 MG tablet  Commonly known as: DELTASONE  Take 4 tablets by mouth daily for 3 days, THEN 3 tablets daily for 3 days, THEN 2 tablets daily for 3 days, THEN 1 tablet daily for 4 days. Start taking on: November 16, 2022  Ask about: Should I take this medication? Where to Get Your Medications        These medications were sent to [place] [address], [address]  [address] [facility] [address]      Phone: [phone]   doxycycline monohydrate 100 MG capsule  nicotine 21 MG/24HR  predniSONE 10 MG tablet         Time Spent on discharge is  33 mins in patient examination, evaluation, counseling as well as medication reconciliation, prescriptions for required medications, discharge plan and follow up. Electronically signed by   Rober Shields MD  12/11/2022  5:32 PM      Thank you Dr. Pooja Wilson DTR for the opportunity to be involved in this patient's care. Attending Physician Statement  I have discussed the care of Dona Mojica and I have examined the patient myselft and taken ros and hpi , including pertinent history and exam findings,  with the resident. I have reviewed the key elements of all parts of the encounter with the resident. I agree with the DC plan as documented by the resident.       Electronically signed by Mae Green MD

## 2023-03-13 RX ORDER — NICOTINE 21 MG/24HR
PATCH, TRANSDERMAL 24 HOURS TRANSDERMAL
Qty: 28 PATCH | OUTPATIENT
Start: 2023-03-13

## 2024-07-24 ENCOUNTER — OFFICE VISIT (OUTPATIENT)
Dept: FAMILY MEDICINE CLINIC | Age: 59
End: 2024-07-24
Payer: COMMERCIAL

## 2024-07-24 VITALS
HEART RATE: 75 BPM | OXYGEN SATURATION: 91 % | DIASTOLIC BLOOD PRESSURE: 56 MMHG | TEMPERATURE: 97.7 F | SYSTOLIC BLOOD PRESSURE: 97 MMHG | WEIGHT: 134 LBS | BODY MASS INDEX: 27.01 KG/M2 | HEIGHT: 59 IN

## 2024-07-24 DIAGNOSIS — R21 RASH AND NONSPECIFIC SKIN ERUPTION: Primary | ICD-10-CM

## 2024-07-24 DIAGNOSIS — Z76.0 MEDICATION REFILL: ICD-10-CM

## 2024-07-24 PROCEDURE — G8427 DOCREV CUR MEDS BY ELIG CLIN: HCPCS | Performed by: NURSE PRACTITIONER

## 2024-07-24 PROCEDURE — 4004F PT TOBACCO SCREEN RCVD TLK: CPT | Performed by: NURSE PRACTITIONER

## 2024-07-24 PROCEDURE — G8419 CALC BMI OUT NRM PARAM NOF/U: HCPCS | Performed by: NURSE PRACTITIONER

## 2024-07-24 PROCEDURE — 3017F COLORECTAL CA SCREEN DOC REV: CPT | Performed by: NURSE PRACTITIONER

## 2024-07-24 PROCEDURE — 99213 OFFICE O/P EST LOW 20 MIN: CPT | Performed by: NURSE PRACTITIONER

## 2024-07-24 RX ORDER — PREDNISONE 20 MG/1
20 TABLET ORAL 2 TIMES DAILY
Qty: 10 TABLET | Refills: 0 | Status: SHIPPED | OUTPATIENT
Start: 2024-07-24 | End: 2024-07-29

## 2024-07-24 RX ORDER — ALBUTEROL SULFATE 90 UG/1
2 AEROSOL, METERED RESPIRATORY (INHALATION) EVERY 6 HOURS PRN
Qty: 18 G | Refills: 0 | Status: SHIPPED | OUTPATIENT
Start: 2024-07-24

## 2024-07-24 RX ORDER — TRIAMCINOLONE ACETONIDE 1 MG/G
OINTMENT TOPICAL 2 TIMES DAILY
Qty: 80 G | Refills: 0 | Status: SHIPPED | OUTPATIENT
Start: 2024-07-24 | End: 2024-07-31

## 2024-07-24 ASSESSMENT — ENCOUNTER SYMPTOMS
COUGH: 0
SHORTNESS OF BREATH: 0
RHINORRHEA: 0
VOMITING: 0
DIARRHEA: 0
NAIL CHANGES: 0
EYE PAIN: 0
SORE THROAT: 0

## 2024-07-24 NOTE — PROGRESS NOTES
course to help continue to alleviate sx  Kenalog oint rx for itching    Refilled pt rescue inhaler per her request    Reviewed over-the-counter treatments for symptom management.  Return worse  Pt verbalized agreement and understanding of POC    Return for Make an Appt. with your Primary Care in 1 week.    Orders Placed This Encounter   Medications    predniSONE (DELTASONE) 20 MG tablet     Sig: Take 1 tablet by mouth 2 times daily for 5 days     Dispense:  10 tablet     Refill:  0    albuterol sulfate HFA (PROVENTIL;VENTOLIN;PROAIR) 108 (90 Base) MCG/ACT inhaler     Sig: Inhale 2 puffs into the lungs every 6 hours as needed for Wheezing     Dispense:  18 g     Refill:  0    triamcinolone (KENALOG) 0.1 % ointment     Sig: Apply topically 2 times daily for 7 days     Dispense:  80 g     Refill:  0         Patient given educational materials - see patient instructions.  Discussed use, benefit, and side effects of prescribed medications.  All patient questions answered.  Pt voicedunderstanding.    Electronically signed by EDWARD Hines CNP on 7/24/2024 at 9:37 AM

## 2024-09-04 ENCOUNTER — TELEPHONE (OUTPATIENT)
Dept: GASTROENTEROLOGY | Age: 59
End: 2024-09-04

## 2025-07-28 ENCOUNTER — TRANSCRIBE ORDERS (OUTPATIENT)
Dept: ADMINISTRATIVE | Age: 60
End: 2025-07-28

## 2025-07-28 DIAGNOSIS — Z12.31 OTHER SCREENING MAMMOGRAM: Primary | ICD-10-CM

## 2025-07-29 ENCOUNTER — TRANSCRIBE ORDERS (OUTPATIENT)
Dept: ADMINISTRATIVE | Age: 60
End: 2025-07-29

## 2025-07-29 DIAGNOSIS — F17.200 CURRENT SMOKER: Primary | ICD-10-CM

## 2025-07-29 DIAGNOSIS — Z12.2 ENCOUNTER FOR SCREENING FOR MALIGNANT NEOPLASM OF RESPIRATORY ORGANS: ICD-10-CM
